# Patient Record
Sex: MALE | Race: OTHER | HISPANIC OR LATINO | URBAN - METROPOLITAN AREA
[De-identification: names, ages, dates, MRNs, and addresses within clinical notes are randomized per-mention and may not be internally consistent; named-entity substitution may affect disease eponyms.]

---

## 2018-03-28 ENCOUNTER — EMERGENCY (EMERGENCY)
Facility: HOSPITAL | Age: 63
LOS: 1 days | Discharge: OTHER INSTITUTION | End: 2018-03-28
Attending: EMERGENCY MEDICINE | Admitting: EMERGENCY MEDICINE
Payer: MEDICARE

## 2018-03-28 VITALS
OXYGEN SATURATION: 100 % | SYSTOLIC BLOOD PRESSURE: 183 MMHG | HEIGHT: 64 IN | DIASTOLIC BLOOD PRESSURE: 111 MMHG | HEART RATE: 93 BPM | WEIGHT: 134.92 LBS | TEMPERATURE: 98 F | RESPIRATION RATE: 20 BRPM

## 2018-03-28 DIAGNOSIS — F31.2 BIPOLAR DISORDER, CURRENT EPISODE MANIC SEVERE WITH PSYCHOTIC FEATURES: ICD-10-CM

## 2018-03-28 LAB
ALBUMIN SERPL ELPH-MCNC: 4.3 G/DL — SIGNIFICANT CHANGE UP (ref 3.3–5.2)
ALP SERPL-CCNC: 56 U/L — SIGNIFICANT CHANGE UP (ref 40–120)
ALT FLD-CCNC: 29 U/L — SIGNIFICANT CHANGE UP
AMPHET UR-MCNC: NEGATIVE — SIGNIFICANT CHANGE UP
ANION GAP SERPL CALC-SCNC: 10 MMOL/L — SIGNIFICANT CHANGE UP (ref 5–17)
ANION GAP SERPL CALC-SCNC: 15 MMOL/L — SIGNIFICANT CHANGE UP (ref 5–17)
APAP SERPL-MCNC: <7.5 UG/ML — LOW (ref 10–26)
APPEARANCE UR: CLEAR — SIGNIFICANT CHANGE UP
AST SERPL-CCNC: 34 U/L — SIGNIFICANT CHANGE UP
BACTERIA # UR AUTO: ABNORMAL
BARBITURATES UR SCN-MCNC: NEGATIVE — SIGNIFICANT CHANGE UP
BASOPHILS # BLD AUTO: 0.1 K/UL — SIGNIFICANT CHANGE UP (ref 0–0.2)
BASOPHILS NFR BLD AUTO: 1.5 % — SIGNIFICANT CHANGE UP (ref 0–2)
BENZODIAZ UR-MCNC: NEGATIVE — SIGNIFICANT CHANGE UP
BILIRUB SERPL-MCNC: 0.4 MG/DL — SIGNIFICANT CHANGE UP (ref 0.4–2)
BILIRUB UR-MCNC: NEGATIVE — SIGNIFICANT CHANGE UP
BUN SERPL-MCNC: 12 MG/DL — SIGNIFICANT CHANGE UP (ref 8–20)
BUN SERPL-MCNC: 12 MG/DL — SIGNIFICANT CHANGE UP (ref 8–20)
CALCIUM SERPL-MCNC: 8.9 MG/DL — SIGNIFICANT CHANGE UP (ref 8.6–10.2)
CALCIUM SERPL-MCNC: 9.9 MG/DL — SIGNIFICANT CHANGE UP (ref 8.6–10.2)
CHLORIDE SERPL-SCNC: 88 MMOL/L — LOW (ref 98–107)
CHLORIDE SERPL-SCNC: 98 MMOL/L — SIGNIFICANT CHANGE UP (ref 98–107)
CO2 SERPL-SCNC: 21 MMOL/L — LOW (ref 22–29)
CO2 SERPL-SCNC: 24 MMOL/L — SIGNIFICANT CHANGE UP (ref 22–29)
COCAINE METAB.OTHER UR-MCNC: NEGATIVE — SIGNIFICANT CHANGE UP
COLOR SPEC: SIGNIFICANT CHANGE UP
CREAT SERPL-MCNC: 0.91 MG/DL — SIGNIFICANT CHANGE UP (ref 0.5–1.3)
CREAT SERPL-MCNC: 1 MG/DL — SIGNIFICANT CHANGE UP (ref 0.5–1.3)
DIFF PNL FLD: NEGATIVE — SIGNIFICANT CHANGE UP
EOSINOPHIL # BLD AUTO: 0.2 K/UL — SIGNIFICANT CHANGE UP (ref 0–0.5)
EOSINOPHIL NFR BLD AUTO: 2.3 % — SIGNIFICANT CHANGE UP (ref 0–5)
EPI CELLS # UR: SIGNIFICANT CHANGE UP
ETHANOL SERPL-MCNC: <10 MG/DL — SIGNIFICANT CHANGE UP
GLUCOSE SERPL-MCNC: 169 MG/DL — HIGH (ref 70–115)
GLUCOSE SERPL-MCNC: 95 MG/DL — SIGNIFICANT CHANGE UP (ref 70–115)
GLUCOSE UR QL: NEGATIVE MG/DL — SIGNIFICANT CHANGE UP
HCT VFR BLD CALC: 40.7 % — LOW (ref 42–52)
HGB BLD-MCNC: 14.2 G/DL — SIGNIFICANT CHANGE UP (ref 14–18)
KETONES UR-MCNC: NEGATIVE — SIGNIFICANT CHANGE UP
LEUKOCYTE ESTERASE UR-ACNC: NEGATIVE — SIGNIFICANT CHANGE UP
LYMPHOCYTES # BLD AUTO: 1.5 K/UL — SIGNIFICANT CHANGE UP (ref 1–4.8)
LYMPHOCYTES # BLD AUTO: 20.5 % — SIGNIFICANT CHANGE UP (ref 20–55)
MCHC RBC-ENTMCNC: 28.5 PG — SIGNIFICANT CHANGE UP (ref 27–31)
MCHC RBC-ENTMCNC: 34.9 G/DL — SIGNIFICANT CHANGE UP (ref 32–36)
MCV RBC AUTO: 81.7 FL — SIGNIFICANT CHANGE UP (ref 80–94)
METHADONE UR-MCNC: NEGATIVE — SIGNIFICANT CHANGE UP
MONOCYTES # BLD AUTO: 0.6 K/UL — SIGNIFICANT CHANGE UP (ref 0–0.8)
MONOCYTES NFR BLD AUTO: 7.8 % — SIGNIFICANT CHANGE UP (ref 3–10)
NEUTROPHILS # BLD AUTO: 4.9 K/UL — SIGNIFICANT CHANGE UP (ref 1.8–8)
NEUTROPHILS NFR BLD AUTO: 67.6 % — SIGNIFICANT CHANGE UP (ref 37–73)
NITRITE UR-MCNC: NEGATIVE — SIGNIFICANT CHANGE UP
OPIATES UR-MCNC: NEGATIVE — SIGNIFICANT CHANGE UP
PCP SPEC-MCNC: SIGNIFICANT CHANGE UP
PCP UR-MCNC: NEGATIVE — SIGNIFICANT CHANGE UP
PH UR: 6.5 — SIGNIFICANT CHANGE UP (ref 5–8)
PLATELET # BLD AUTO: 323 K/UL — SIGNIFICANT CHANGE UP (ref 150–400)
POTASSIUM SERPL-MCNC: 4.7 MMOL/L — SIGNIFICANT CHANGE UP (ref 3.5–5.3)
POTASSIUM SERPL-MCNC: 5.3 MMOL/L — SIGNIFICANT CHANGE UP (ref 3.5–5.3)
POTASSIUM SERPL-SCNC: 4.7 MMOL/L — SIGNIFICANT CHANGE UP (ref 3.5–5.3)
POTASSIUM SERPL-SCNC: 5.3 MMOL/L — SIGNIFICANT CHANGE UP (ref 3.5–5.3)
PROT SERPL-MCNC: 7.9 G/DL — SIGNIFICANT CHANGE UP (ref 6.6–8.7)
PROT UR-MCNC: 15 MG/DL
RBC # BLD: 4.98 M/UL — SIGNIFICANT CHANGE UP (ref 4.6–6.2)
RBC # FLD: 13 % — SIGNIFICANT CHANGE UP (ref 11–15.6)
SALICYLATES SERPL-MCNC: <0.6 MG/DL — LOW (ref 10–20)
SODIUM SERPL-SCNC: 124 MMOL/L — LOW (ref 135–145)
SODIUM SERPL-SCNC: 132 MMOL/L — LOW (ref 135–145)
SP GR SPEC: 1.01 — SIGNIFICANT CHANGE UP (ref 1.01–1.02)
THC UR QL: NEGATIVE — SIGNIFICANT CHANGE UP
TSH SERPL-MCNC: 1.87 UIU/ML — SIGNIFICANT CHANGE UP (ref 0.27–4.2)
UROBILINOGEN FLD QL: NEGATIVE MG/DL — SIGNIFICANT CHANGE UP
WBC # BLD: 7.3 K/UL — SIGNIFICANT CHANGE UP (ref 4.8–10.8)
WBC # FLD AUTO: 7.3 K/UL — SIGNIFICANT CHANGE UP (ref 4.8–10.8)
WBC UR QL: SIGNIFICANT CHANGE UP

## 2018-03-28 PROCEDURE — 99285 EMERGENCY DEPT VISIT HI MDM: CPT

## 2018-03-28 PROCEDURE — 93010 ELECTROCARDIOGRAM REPORT: CPT

## 2018-03-28 RX ORDER — SODIUM CHLORIDE 9 MG/ML
1000 INJECTION INTRAMUSCULAR; INTRAVENOUS; SUBCUTANEOUS ONCE
Qty: 0 | Refills: 0 | Status: COMPLETED | OUTPATIENT
Start: 2018-03-28 | End: 2018-03-28

## 2018-03-28 RX ORDER — OLANZAPINE 15 MG/1
5 TABLET, FILM COATED ORAL
Qty: 0 | Refills: 0 | Status: DISCONTINUED | OUTPATIENT
Start: 2018-03-28 | End: 2018-04-02

## 2018-03-28 RX ORDER — HALOPERIDOL DECANOATE 100 MG/ML
5 INJECTION INTRAMUSCULAR EVERY 4 HOURS
Qty: 0 | Refills: 0 | Status: DISCONTINUED | OUTPATIENT
Start: 2018-03-28 | End: 2018-04-02

## 2018-03-28 RX ORDER — HALOPERIDOL DECANOATE 100 MG/ML
5 INJECTION INTRAMUSCULAR ONCE
Qty: 0 | Refills: 0 | Status: COMPLETED | OUTPATIENT
Start: 2018-03-28 | End: 2018-03-28

## 2018-03-28 RX ORDER — SODIUM CHLORIDE 9 MG/ML
1 INJECTION INTRAMUSCULAR; INTRAVENOUS; SUBCUTANEOUS ONCE
Qty: 0 | Refills: 0 | Status: COMPLETED | OUTPATIENT
Start: 2018-03-28 | End: 2018-03-28

## 2018-03-28 RX ADMIN — SODIUM CHLORIDE 1000 MILLILITER(S): 9 INJECTION INTRAMUSCULAR; INTRAVENOUS; SUBCUTANEOUS at 19:49

## 2018-03-28 RX ADMIN — HALOPERIDOL DECANOATE 5 MILLIGRAM(S): 100 INJECTION INTRAMUSCULAR at 15:00

## 2018-03-28 NOTE — ED ADULT NURSE REASSESSMENT NOTE - NS ED NURSE REASSESS COMMENT FT1
Pt returned to , in no acute distress at this time ambulating well OOB to bathroom and day bentley.  Pt ate 50% of meal tolerated well.  Will continue to monitor and maintain safety.  Sodium currently at 132, in no distress.  Awaiting dispo.

## 2018-03-28 NOTE — ED BEHAVIORAL HEALTH ASSESSMENT NOTE - PAST PSYCHOTROPIC MEDICATION
As above, pt has also taken Depakote in the past. Note: pt is poor historian and information about medications obtained by calling pharmacy

## 2018-03-28 NOTE — ED BEHAVIORAL HEALTH ASSESSMENT NOTE - PATIENT'S CHIEF COMPLAINT
My stomach hurts because my wife and daughter are trying to poison me.. Patient reports that his stomach hurts because people are trying to poison him.

## 2018-03-28 NOTE — ED BEHAVIORAL HEALTH ASSESSMENT NOTE - CURRENT MEDICATION
Not taking any medications, as per prior record :  Nexium 40 mg daily, Haldol decanoate 100 mg IM x 4 weeks, Lithium carbonate  mg at night, Haldol 10 mg at night Last medication regimen (pt has been non compliant): Klonopin 1 mg twice daily (started at [facility]), Depakote  mg at night, Zyprexa 10 mg twice daily, Haldol decanoate 100 mg IM per month (has been refusing months)

## 2018-03-28 NOTE — ED BEHAVIORAL HEALTH ASSESSMENT NOTE - OTHER PAST PSYCHIATRIC HISTORY (INCLUDE DETAILS REGARDING ONSET, COURSE OF ILLNESS, INPATIENT/OUTPATIENT TREATMENT)
Patient reports that he has 20 year history of multiple psychiatric hospitalizations for aggression (likely psychosis).  Patient denies any prior suicide attempts. Patient has followed up on the grounds of [facility] as an [facility] Patient reports that he has 20 year history of multiple psychiatric hospitalizations for aggression (likely psychosis).  Patient denies any prior suicide attempts. Patient has followed up on the grounds of [facility] as an outpatient. Currently followed by ACT team.  Last hospitalized in [facility] in January.

## 2018-03-28 NOTE — ED ADULT NURSE REASSESSMENT NOTE - NS ED NURSE REASSESS COMMENT FT1
Patient offered dinner and refused.  Patient educated about medication and encouraged to comply but refused medication reporting it would be bad for his stomach.  No attempts to harm self or others and safety maintained. Patient offered dinner and refused.  Patient educated about medication and encouraged to comply but refused medication reporting it would be bad for his stomach.  No attempts to harm self or others and safety maintained.  Dr. Sanders notified of refusal.

## 2018-03-28 NOTE — ED PROVIDER NOTE - PROGRESS NOTE DETAILS
As per sign-out from Dr. Hart patient pending repeat bmp in the morning to assess sodium. pt signed out to me from a group home awaiting repeat labs. pt is no longer hyponatremic and will be transferred back to [facility]

## 2018-03-28 NOTE — ED ADULT TRIAGE NOTE - CHIEF COMPLAINT QUOTE
Patient brought in by ambulance A/Ox3, as per EMS-daughter states he has not taken any of his medication for over a month, hx of schizophrenia.  Crisis center called 911.  Patient angry/yelling in triage.

## 2018-03-28 NOTE — ED BEHAVIORAL HEALTH ASSESSMENT NOTE - SUMMARY
Patient is a 62  year old, male; domicile with mother, daughter and 3 y/o grandson; ; noncaregiver; unemployed; PPH of schizophrenia; with multiple prior psychiatric hospitalizations ; no known suicide attempts; no active substance abuse or known history of complicated withdrawal; PMH of GERD ; brought in at daughter's request for not takning medications for one month and decompen Patient is a 62  year old, male; domiciles; ; noncaregiver; unemployed; PPH of schizophrenia; with multiple prior psychiatric hospitalizations ; no known suicide attempts; no active substance abuse or known history of complicated withdrawal; PMH of GERD ; brought in at daughter's request by ACT team  for not taking medications and decompensating.  Patient is currently exhibiting disorganized speech, with intense, labile mood, paranoia, making suicidal statements, with poor insight and judgment and non compliance with medications.  Patient requires inpatient psychiatric hospitalization for safety and stabilization.

## 2018-03-28 NOTE — ED PROVIDER NOTE - OBJECTIVE STATEMENT
61 y/o M, with hx of schizophrenia, BIBA from group home for aggressive behavior.  Pt has been refusing his medications for several days and has become increasingly more agitated.  Denies any physical complaints at this time including fever, chills, visiual changes, CP, SOB, HA, cough, abd pain, N/V/D, or back pain.

## 2018-03-28 NOTE — ED BEHAVIORAL HEALTH ASSESSMENT NOTE - HPI (INCLUDE ILLNESS QUALITY, SEVERITY, DURATION, TIMING, CONTEXT, MODIFYING FACTORS, ASSOCIATED SIGNS AND SYMPTOMS)
Patient is a 62 year old, male; domicile with mother, daughter and 1 y/o grandson; ; noncaregiver; unemployed; PPH of schizophrenia; with multiple prior psychiatric hospitalizations ; no known suicide attempts; no active substance abuse or known history of complicated withdrawal; PMH of GERD ; brought in by EMS apparently called by daughter for decompensation and non compliance with medications for one month.      As per evaluation on 10/19/16:        On interview pt was initially mute and was not willing to talk. He nodded his head when asked if he was depressed. After further explanation by writer, pt was able to engage in interview. Interview was conducted in Cuban.  Patient admits that he was discharged from the hospital about one week ago. He states that he does not take any medications because the doctor told them he does not need them anymore.  He does not believe he has any psychiatric illness, although concedes that other people have said he has schizophrenia.  He reports that his stomach hurt and that is the reason he came to the hospital. He explains that he believes that his wife and daughter are trying to kill him by poisoning his food. He started crying during interview stating that his daughter took away his grandson, and now a  takes care of him. He believes  is trying to hurt grandson. He reports that he wants to protect his grandson. He mentions that the neighbors area also trying to kill him.  Patient admits to feeling depressed.  He states he can't sleep, has poor energy, can't concentrate and has poor appetite.  When suicidal ideation was explored he admits that he wants to kill himself by taking pills and states that the only reason he has not done this is because he does not have any pills to take.  He states he is not following up at [facility] because he does not need to and has no problems. Patient denies hearing any voices.  He denies any current substance use.       Spoke with wife who reports pt has been in and out of psychiatric hospitals and has not returned to baseline as of yet.  She feels that he is worse.  Reports he is not taking medications or following up with treatment. He has done well in the past when he has been compliant with treatment. She corroborates that pt feels that people including his wife and daughter want to kill him and that he has been making suicidal statements.  She reports pt has been hiding knives and put plants under his shirt because he feels that plants can cure him.  She states that she and daughter works and pt does not have supervision and support and that currently pt is not able to function independently. His mood is unstable and at times he cries for no apparent reason.  She is concerned for his safety and feels he needs to be in the hospital until he is stabilized.        Called pharmacy ([facility] [phone]), who reported that pt has prescriptions for Nexium 40 mg dailyi,  Haldol decanoate 100 mg injection, lithium carbonate  at night, cogentin 2 mga t night, Haldol 10 mg at night that was transmitted on October 13th and never picked up from pharmacy (By Dr. Stephenson [phone]). He also had depakote 500 mg ER prescribed in August that he never picked up. Patient is a 62 year old, male; domiciled alone,  ; noncaregiver; unemployed; PPH of schizophrenia; with multiple prior psychiatric hospitalizations ; no known suicide attempts; no active substance abuse or known history of complicated withdrawal; PMH of GERD ; brought in by EMS accompanied by ACT team,  apparently called by daughter for decompensation and non compliance with medications for one month.         Patient formerly lived with wife but has been living on his own for the last 6 months.   Patient admits that he is not taking medications.  He does not know why he is here and does not feel he has any psychiatric problems.  He states that he is not crazy but that Nic mckeon is, and that Nic Mckeon is the antichrist.  He states that other residents are exposing their breasts to him and that managers in th home want to have sex with him. He also states that derik is trying to poison him and are urinating in his water and taking drugs.  He initially denied any desire to hurt himself or others, but then stated that he wanted to be given a lethal injection to join his mother in [facility]     Spoke with ACT team (Chloe) who reported that patient has been non compliant with medications and avoiding the ACT team.  ACT team has seen patient in only on of prior 6 visits.  He has documented diagnoses of bipolar disorder with psychotic features.  He has not taken Haldol decanoate in several months, and he became less compliant with treatment when he moved to [place] to live on his own.  When he stops taking medications, patient becomes paranoid, disorganized and incoherent.  He does very well when he takes his medications.  He was last discharged from [facility] in January     Attempted to contact daughter and wife for collateral information with no answer.  Voicemail was full and left message to call back writer in other number with no response.

## 2018-03-28 NOTE — ED BEHAVIORAL HEALTH ASSESSMENT NOTE - PRIMARY DX
Schizophrenia, paranoid Bipolar affective disorder, currently manic, severe, with psychotic features
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
## 2018-03-28 NOTE — ED BEHAVIORAL HEALTH ASSESSMENT NOTE - DESCRIPTION
Patient does not know of any, apparently GERD Patient was born in [place], Came to [place] years ago Patient was sitting in chair.  He did not appear to be in physical distress.  His affect was intense, labile with expansive range.  Patient went from laughing hysterically to being on the verge of tears to anger in seconds.     Vital Signs Last 24 Hrs  T(C): 36.7 (28 Mar 2018 15:03), Max: 36.7 (28 Mar 2018 15:03)  T(F): 98.1 (28 Mar 2018 15:03), Max: 98.1 (28 Mar 2018 15:03)  HR: 93 (28 Mar 2018 15:03) (93 - 93)  BP: 183/111 (28 Mar 2018 15:03) (183/111 - 183/111)  BP(mean): --  RR: 20 (28 Mar 2018 15:03) (20 - 20)  SpO2: 100% (28 Mar 2018 15:03) (100% - 100%)

## 2018-03-29 VITALS
OXYGEN SATURATION: 99 % | HEART RATE: 63 BPM | SYSTOLIC BLOOD PRESSURE: 158 MMHG | DIASTOLIC BLOOD PRESSURE: 81 MMHG | RESPIRATION RATE: 20 BRPM | TEMPERATURE: 98 F

## 2018-03-29 LAB
ANION GAP SERPL CALC-SCNC: 10 MMOL/L — SIGNIFICANT CHANGE UP (ref 5–17)
BUN SERPL-MCNC: 12 MG/DL — SIGNIFICANT CHANGE UP (ref 8–20)
CALCIUM SERPL-MCNC: 9.1 MG/DL — SIGNIFICANT CHANGE UP (ref 8.6–10.2)
CHLORIDE SERPL-SCNC: 98 MMOL/L — SIGNIFICANT CHANGE UP (ref 98–107)
CO2 SERPL-SCNC: 27 MMOL/L — SIGNIFICANT CHANGE UP (ref 22–29)
CREAT SERPL-MCNC: 0.91 MG/DL — SIGNIFICANT CHANGE UP (ref 0.5–1.3)
GLUCOSE SERPL-MCNC: 104 MG/DL — SIGNIFICANT CHANGE UP (ref 70–115)
POTASSIUM SERPL-MCNC: 5 MMOL/L — SIGNIFICANT CHANGE UP (ref 3.5–5.3)
POTASSIUM SERPL-SCNC: 5 MMOL/L — SIGNIFICANT CHANGE UP (ref 3.5–5.3)
SODIUM SERPL-SCNC: 135 MMOL/L — SIGNIFICANT CHANGE UP (ref 135–145)

## 2018-03-29 PROCEDURE — 93010 ELECTROCARDIOGRAM REPORT: CPT

## 2018-03-29 PROCEDURE — 84443 ASSAY THYROID STIM HORMONE: CPT

## 2018-03-29 PROCEDURE — 36415 COLL VENOUS BLD VENIPUNCTURE: CPT

## 2018-03-29 PROCEDURE — 85027 COMPLETE CBC AUTOMATED: CPT

## 2018-03-29 PROCEDURE — T1013: CPT

## 2018-03-29 PROCEDURE — 80307 DRUG TEST PRSMV CHEM ANLYZR: CPT

## 2018-03-29 PROCEDURE — 93005 ELECTROCARDIOGRAM TRACING: CPT

## 2018-03-29 PROCEDURE — 80053 COMPREHEN METABOLIC PANEL: CPT

## 2018-03-29 PROCEDURE — 99285 EMERGENCY DEPT VISIT HI MDM: CPT | Mod: 25

## 2018-03-29 PROCEDURE — 96372 THER/PROPH/DIAG INJ SC/IM: CPT

## 2018-03-29 PROCEDURE — 81001 URINALYSIS AUTO W/SCOPE: CPT

## 2018-03-29 PROCEDURE — 80048 BASIC METABOLIC PNL TOTAL CA: CPT

## 2018-03-29 RX ADMIN — OLANZAPINE 5 MILLIGRAM(S): 15 TABLET, FILM COATED ORAL at 06:08

## 2018-03-29 RX ADMIN — SODIUM CHLORIDE 1 GRAM(S): 9 INJECTION INTRAMUSCULAR; INTRAVENOUS; SUBCUTANEOUS at 01:00

## 2018-03-29 NOTE — ED BEHAVIORAL HEALTH NOTE - BEHAVIORAL HEALTH NOTE
SW Note: Pt transferred to [facility] for inpt psychiatric tx. Accepting MD, Dr. Copeland. 9.37 legals completed. Ambulance arranged with [facility]. Msg left for ACT team [phone]

## 2018-03-29 NOTE — ED ADULT NURSE REASSESSMENT NOTE - NS ED NURSE REASSESS COMMENT FT1
Pt currently sleeping, sodium meds given, tolerated well, repeat in the am Blood work and EKG, for dispo.  Will continue to monitor and maintain safety.

## 2018-03-29 NOTE — ED ADULT NURSE REASSESSMENT NOTE - NS ED NURSE REASSESS COMMENT FT1
Patient has been resting comfortably. Accepted for transfer to [facility]. Patient aware and expressed understanding and intent to cooperate with transfer process. EMS called.

## 2018-03-29 NOTE — ED ADULT NURSE REASSESSMENT NOTE - TEMPLATE LIST FOR HEAD TO TOE ASSESSMENT
Psych/Behavioral

## 2018-03-29 NOTE — ED BEHAVIORAL HEALTH NOTE - BEHAVIORAL HEALTH NOTE
SW Note: pt presented to [facility]  and Dr. Fowler requested pt be given NA to bring up from 124, pt trx back to Main ED,  treated and returned to  with Na 132 -  [facility] still declining -  pending repeat Na >135 + repeat ekg for AM. Held for AM - MD to order oral Na and repeat in AM

## 2018-03-30 ENCOUNTER — EMERGENCY (EMERGENCY)
Facility: HOSPITAL | Age: 63
LOS: 1 days | Discharge: HOME | End: 2018-03-30
Attending: EMERGENCY MEDICINE
Payer: MEDICARE

## 2018-03-30 VITALS
HEART RATE: 95 BPM | TEMPERATURE: 98 F | HEIGHT: 69 IN | OXYGEN SATURATION: 98 % | SYSTOLIC BLOOD PRESSURE: 152 MMHG | WEIGHT: 190.04 LBS | RESPIRATION RATE: 18 BRPM | DIASTOLIC BLOOD PRESSURE: 80 MMHG

## 2018-03-30 LAB
ALBUMIN SERPL ELPH-MCNC: 3.8 G/DL — SIGNIFICANT CHANGE UP (ref 3.3–5.2)
ALP SERPL-CCNC: 54 U/L — SIGNIFICANT CHANGE UP (ref 40–120)
ALT FLD-CCNC: 23 U/L — SIGNIFICANT CHANGE UP
ANION GAP SERPL CALC-SCNC: 10 MMOL/L — SIGNIFICANT CHANGE UP (ref 5–17)
AST SERPL-CCNC: 33 U/L — SIGNIFICANT CHANGE UP
BILIRUB SERPL-MCNC: <0.2 MG/DL — LOW (ref 0.4–2)
BUN SERPL-MCNC: 14 MG/DL — SIGNIFICANT CHANGE UP (ref 8–20)
CALCIUM SERPL-MCNC: 8.9 MG/DL — SIGNIFICANT CHANGE UP (ref 8.6–10.2)
CHLORIDE SERPL-SCNC: 93 MMOL/L — LOW (ref 98–107)
CO2 SERPL-SCNC: 26 MMOL/L — SIGNIFICANT CHANGE UP (ref 22–29)
CREAT SERPL-MCNC: 0.83 MG/DL — SIGNIFICANT CHANGE UP (ref 0.5–1.3)
GLUCOSE SERPL-MCNC: 110 MG/DL — SIGNIFICANT CHANGE UP (ref 70–115)
HCT VFR BLD CALC: 36.5 % — LOW (ref 42–52)
HGB BLD-MCNC: 12.6 G/DL — LOW (ref 14–18)
MCHC RBC-ENTMCNC: 28.3 PG — SIGNIFICANT CHANGE UP (ref 27–31)
MCHC RBC-ENTMCNC: 34.5 G/DL — SIGNIFICANT CHANGE UP (ref 32–36)
MCV RBC AUTO: 82 FL — SIGNIFICANT CHANGE UP (ref 80–94)
OSMOLALITY SERPL: 286 MOSM/KG — SIGNIFICANT CHANGE UP (ref 280–300)
PLATELET # BLD AUTO: 289 K/UL — SIGNIFICANT CHANGE UP (ref 150–400)
POTASSIUM SERPL-MCNC: 4.4 MMOL/L — SIGNIFICANT CHANGE UP (ref 3.5–5.3)
POTASSIUM SERPL-SCNC: 4.4 MMOL/L — SIGNIFICANT CHANGE UP (ref 3.5–5.3)
PROT SERPL-MCNC: 6.8 G/DL — SIGNIFICANT CHANGE UP (ref 6.6–8.7)
RBC # BLD: 4.45 M/UL — LOW (ref 4.6–6.2)
RBC # FLD: 12.9 % — SIGNIFICANT CHANGE UP (ref 11–15.6)
SODIUM SERPL-SCNC: 129 MMOL/L — LOW (ref 135–145)
WBC # BLD: 7.8 K/UL — SIGNIFICANT CHANGE UP (ref 4.8–10.8)
WBC # FLD AUTO: 7.8 K/UL — SIGNIFICANT CHANGE UP (ref 4.8–10.8)

## 2018-03-30 PROCEDURE — 80053 COMPREHEN METABOLIC PANEL: CPT

## 2018-03-30 PROCEDURE — 83930 ASSAY OF BLOOD OSMOLALITY: CPT

## 2018-03-30 PROCEDURE — 85027 COMPLETE CBC AUTOMATED: CPT

## 2018-03-30 PROCEDURE — 99284 EMERGENCY DEPT VISIT MOD MDM: CPT

## 2018-03-30 PROCEDURE — 36415 COLL VENOUS BLD VENIPUNCTURE: CPT

## 2018-03-30 PROCEDURE — 99283 EMERGENCY DEPT VISIT LOW MDM: CPT

## 2018-03-30 RX ORDER — SODIUM CHLORIDE 9 MG/ML
1 INJECTION INTRAMUSCULAR; INTRAVENOUS; SUBCUTANEOUS ONCE
Qty: 0 | Refills: 0 | Status: COMPLETED | OUTPATIENT
Start: 2018-03-30 | End: 2018-03-30

## 2018-03-30 RX ADMIN — SODIUM CHLORIDE 1 GRAM(S): 9 INJECTION INTRAMUSCULAR; INTRAVENOUS; SUBCUTANEOUS at 20:23

## 2018-03-30 NOTE — ED ADULT NURSE NOTE - CHIEF COMPLAINT QUOTE
pt sent from [facility] for hyponatremia    md at [facility] states that he droped 9 points in 24 hours befor they could place pt on fluid restriction.

## 2018-03-30 NOTE — CHART NOTE - NSCHARTNOTEFT_GEN_A_CORE
SW Note: Intern Freda Garcia arranged for pt transport via [facility] back to [facility].

## 2018-03-30 NOTE — ED ADULT NURSE NOTE - OBJECTIVE STATEMENT
Pt. sent to ED by PMD at [facility] for low Na+ at 136 last check on 3/30. Pt. denies any symptoms of N/V/ HA change in vision, CP, or SOB at this time. Pt. able to move all extremities with ease pulses regular bilaterally, skin warm, dry, and intact. Lung sounds clear. [facility] aid at bedside. Pt. is able to ambulate with a steady gait.

## 2018-03-30 NOTE — ED ADULT NURSE REASSESSMENT NOTE - NS ED NURSE REASSESS COMMENT FT1
pt care assumed at 2000, no apparent distress noted at this time, charting as noted. pt received A&Ox2 resting in bed comfortably with [facility] aide at bedside. pt denies any complaints at this time. Pt was medicated as per orders. Pt states he wants to brush his teeth. Tooth brush provided. plan of care explained, will continue to monitor.

## 2018-03-30 NOTE — ED PROVIDER NOTE - OBJECTIVE STATEMENT
63 y/o M pt with hx schizophrenia presents to ED from [facility], due to hyponatremia. He has a hx hyponatremia and drinking excessive water; he I currently on 100 ml fluid restriction. Currently asymptomatic. He was here March 28,2017 with a sodium level of 124 and discharged the next day with a level 135.  He is currently on Zyprexa, Ativan and Haldol. No further complaints at this time. 63 y/o M pt with hx schizophrenia presents to ED from [facility], due to hyponatremia. He has a hx hyponatremia and drinking excessive water; he is currently on 100 ml fluid restriction. Currently asymptomatic. He was here March 28,2017 with a sodium level of 124 and discharged the next day with a level 135.  He is currently on Zyprexa, Ativan and Haldol. No further complaints at this time. 61 y/o M pt with hx schizophrenia presents to ED from [facility], due to hyponatremia. He has a hx of hyponatremia and drinking excessive water; he is currently on 100 ml fluid restriction. Currently asymptomatic. He was here March 28,2017 with a sodium level of 124 and discharged the next day with a level 135.  He is currently on Zyprexa, Ativan and Haldol. No further complaints at this time. 63 y/o M pt with hx schizophrenia presents to ED from [facility], due to hyponatremia. He has a hx of hyponatremia and drinking excessive water; he is currently on 1000 ml fluid restriction. Currently asymptomatic. He was here March 28,2017 with a sodium level of 124 and discharged the next day with a level 135.  He current psych meds are Zyprexa, Ativan and Haldol. No further complaints at this time.

## 2018-03-30 NOTE — ED ADULT TRIAGE NOTE - CHIEF COMPLAINT QUOTE
pt sent from [facility] for hyponatremia    md at [facility] states that he droped 9 points in 24 hours befor they could place pt on fluid restriction.

## 2018-04-02 ENCOUNTER — INPATIENT (INPATIENT)
Facility: HOSPITAL | Age: 63
LOS: 1 days | Discharge: PSYCHIATRIC OR SUBSTANCE USE FACILITY | DRG: 644 | End: 2018-04-04
Attending: FAMILY MEDICINE | Admitting: FAMILY MEDICINE
Payer: MEDICARE

## 2018-04-02 VITALS
SYSTOLIC BLOOD PRESSURE: 169 MMHG | RESPIRATION RATE: 16 BRPM | HEART RATE: 78 BPM | TEMPERATURE: 98 F | WEIGHT: 164.91 LBS | OXYGEN SATURATION: 100 % | DIASTOLIC BLOOD PRESSURE: 75 MMHG

## 2018-04-02 DIAGNOSIS — K21.9 GASTRO-ESOPHAGEAL REFLUX DISEASE WITHOUT ESOPHAGITIS: ICD-10-CM

## 2018-04-02 DIAGNOSIS — Z29.9 ENCOUNTER FOR PROPHYLACTIC MEASURES, UNSPECIFIED: ICD-10-CM

## 2018-04-02 DIAGNOSIS — F31.9 BIPOLAR DISORDER, UNSPECIFIED: ICD-10-CM

## 2018-04-02 DIAGNOSIS — E87.1 HYPO-OSMOLALITY AND HYPONATREMIA: ICD-10-CM

## 2018-04-02 LAB
ALBUMIN SERPL ELPH-MCNC: 3.7 G/DL — SIGNIFICANT CHANGE UP (ref 3.3–5)
ALP SERPL-CCNC: 64 U/L — SIGNIFICANT CHANGE UP (ref 40–120)
ALT FLD-CCNC: 31 U/L — SIGNIFICANT CHANGE UP (ref 12–78)
ANION GAP SERPL CALC-SCNC: 7 MMOL/L — SIGNIFICANT CHANGE UP (ref 5–17)
APPEARANCE UR: CLEAR — SIGNIFICANT CHANGE UP
AST SERPL-CCNC: 29 U/L — SIGNIFICANT CHANGE UP (ref 15–37)
BASOPHILS # BLD AUTO: 0.2 K/UL — SIGNIFICANT CHANGE UP (ref 0–0.2)
BASOPHILS NFR BLD AUTO: 2.3 % — HIGH (ref 0–2)
BILIRUB SERPL-MCNC: 0.2 MG/DL — SIGNIFICANT CHANGE UP (ref 0.2–1.2)
BILIRUB UR-MCNC: NEGATIVE — SIGNIFICANT CHANGE UP
BUN SERPL-MCNC: 16 MG/DL — SIGNIFICANT CHANGE UP (ref 7–23)
CALCIUM SERPL-MCNC: 8.8 MG/DL — SIGNIFICANT CHANGE UP (ref 8.5–10.1)
CHLORIDE SERPL-SCNC: 87 MMOL/L — LOW (ref 96–108)
CO2 SERPL-SCNC: 27 MMOL/L — SIGNIFICANT CHANGE UP (ref 22–31)
COLOR SPEC: SIGNIFICANT CHANGE UP
CREAT SERPL-MCNC: 0.77 MG/DL — SIGNIFICANT CHANGE UP (ref 0.5–1.3)
DIFF PNL FLD: NEGATIVE — SIGNIFICANT CHANGE UP
EOSINOPHIL # BLD AUTO: 0.2 K/UL — SIGNIFICANT CHANGE UP (ref 0–0.5)
EOSINOPHIL NFR BLD AUTO: 2.5 % — SIGNIFICANT CHANGE UP (ref 0–6)
GLUCOSE SERPL-MCNC: 99 MG/DL — SIGNIFICANT CHANGE UP (ref 70–99)
GLUCOSE UR QL: NEGATIVE — SIGNIFICANT CHANGE UP
HCT VFR BLD CALC: 39 % — SIGNIFICANT CHANGE UP (ref 39–50)
HGB BLD-MCNC: 13.1 G/DL — SIGNIFICANT CHANGE UP (ref 13–17)
KETONES UR-MCNC: NEGATIVE — SIGNIFICANT CHANGE UP
LEUKOCYTE ESTERASE UR-ACNC: NEGATIVE — SIGNIFICANT CHANGE UP
LYMPHOCYTES # BLD AUTO: 2.2 K/UL — SIGNIFICANT CHANGE UP (ref 1–3.3)
LYMPHOCYTES # BLD AUTO: 27 % — SIGNIFICANT CHANGE UP (ref 13–44)
MCHC RBC-ENTMCNC: 27.9 PG — SIGNIFICANT CHANGE UP (ref 27–34)
MCHC RBC-ENTMCNC: 33.6 GM/DL — SIGNIFICANT CHANGE UP (ref 32–36)
MCV RBC AUTO: 83 FL — SIGNIFICANT CHANGE UP (ref 80–100)
MONOCYTES # BLD AUTO: 1 K/UL — HIGH (ref 0–0.9)
MONOCYTES NFR BLD AUTO: 12.7 % — HIGH (ref 1–9)
NEUTROPHILS # BLD AUTO: 4.5 K/UL — SIGNIFICANT CHANGE UP (ref 1.8–7.4)
NEUTROPHILS NFR BLD AUTO: 55.5 % — SIGNIFICANT CHANGE UP (ref 43–77)
NITRITE UR-MCNC: NEGATIVE — SIGNIFICANT CHANGE UP
PH UR: 7 — SIGNIFICANT CHANGE UP (ref 5–8)
PLATELET # BLD AUTO: 293 K/UL — SIGNIFICANT CHANGE UP (ref 150–400)
POTASSIUM SERPL-MCNC: 5.1 MMOL/L — SIGNIFICANT CHANGE UP (ref 3.5–5.3)
POTASSIUM SERPL-SCNC: 5.1 MMOL/L — SIGNIFICANT CHANGE UP (ref 3.5–5.3)
PROT SERPL-MCNC: 7.5 G/DL — SIGNIFICANT CHANGE UP (ref 6–8.3)
PROT UR-MCNC: NEGATIVE — SIGNIFICANT CHANGE UP
RBC # BLD: 4.7 M/UL — SIGNIFICANT CHANGE UP (ref 4.2–5.8)
RBC # FLD: 11.6 % — SIGNIFICANT CHANGE UP (ref 10.3–14.5)
SODIUM SERPL-SCNC: 121 MMOL/L — LOW (ref 135–145)
SP GR SPEC: 1 — LOW (ref 1.01–1.02)
UROBILINOGEN FLD QL: NEGATIVE — SIGNIFICANT CHANGE UP
WBC # BLD: 8 K/UL — SIGNIFICANT CHANGE UP (ref 3.8–10.5)
WBC # FLD AUTO: 8 K/UL — SIGNIFICANT CHANGE UP (ref 3.8–10.5)

## 2018-04-02 PROCEDURE — 71045 X-RAY EXAM CHEST 1 VIEW: CPT | Mod: 26

## 2018-04-02 PROCEDURE — 99223 1ST HOSP IP/OBS HIGH 75: CPT | Mod: AI,GC

## 2018-04-02 PROCEDURE — 93010 ELECTROCARDIOGRAM REPORT: CPT

## 2018-04-02 PROCEDURE — 99285 EMERGENCY DEPT VISIT HI MDM: CPT

## 2018-04-02 RX ORDER — SODIUM CHLORIDE 9 MG/ML
1000 INJECTION INTRAMUSCULAR; INTRAVENOUS; SUBCUTANEOUS
Qty: 0 | Refills: 0 | Status: DISCONTINUED | OUTPATIENT
Start: 2018-04-02 | End: 2018-04-02

## 2018-04-02 RX ORDER — SODIUM CHLORIDE 9 MG/ML
1000 INJECTION INTRAMUSCULAR; INTRAVENOUS; SUBCUTANEOUS ONCE
Qty: 0 | Refills: 0 | Status: COMPLETED | OUTPATIENT
Start: 2018-04-02 | End: 2018-04-02

## 2018-04-02 RX ORDER — ENOXAPARIN SODIUM 100 MG/ML
40 INJECTION SUBCUTANEOUS DAILY
Qty: 0 | Refills: 0 | Status: DISCONTINUED | OUTPATIENT
Start: 2018-04-02 | End: 2018-04-04

## 2018-04-02 RX ORDER — OLANZAPINE 15 MG/1
5 TABLET, FILM COATED ORAL ONCE
Qty: 0 | Refills: 0 | Status: COMPLETED | OUTPATIENT
Start: 2018-04-02 | End: 2018-04-02

## 2018-04-02 RX ORDER — OLANZAPINE 15 MG/1
5 TABLET, FILM COATED ORAL EVERY 6 HOURS
Qty: 0 | Refills: 0 | Status: DISCONTINUED | OUTPATIENT
Start: 2018-04-02 | End: 2018-04-02

## 2018-04-02 RX ORDER — DIVALPROEX SODIUM 500 MG/1
500 TABLET, DELAYED RELEASE ORAL
Qty: 0 | Refills: 0 | Status: DISCONTINUED | OUTPATIENT
Start: 2018-04-02 | End: 2018-04-02

## 2018-04-02 RX ORDER — DIVALPROEX SODIUM 500 MG/1
500 TABLET, DELAYED RELEASE ORAL
Qty: 0 | Refills: 0 | Status: DISCONTINUED | OUTPATIENT
Start: 2018-04-02 | End: 2018-04-04

## 2018-04-02 RX ORDER — ACETAMINOPHEN 500 MG
650 TABLET ORAL EVERY 6 HOURS
Qty: 0 | Refills: 0 | Status: DISCONTINUED | OUTPATIENT
Start: 2018-04-02 | End: 2018-04-04

## 2018-04-02 RX ORDER — OLANZAPINE 15 MG/1
5 TABLET, FILM COATED ORAL EVERY 6 HOURS
Qty: 0 | Refills: 0 | Status: DISCONTINUED | OUTPATIENT
Start: 2018-04-02 | End: 2018-04-04

## 2018-04-02 RX ORDER — PANTOPRAZOLE SODIUM 20 MG/1
40 TABLET, DELAYED RELEASE ORAL
Qty: 0 | Refills: 0 | Status: DISCONTINUED | OUTPATIENT
Start: 2018-04-02 | End: 2018-04-04

## 2018-04-02 RX ADMIN — SODIUM CHLORIDE 1000 MILLILITER(S): 9 INJECTION INTRAMUSCULAR; INTRAVENOUS; SUBCUTANEOUS at 17:55

## 2018-04-02 RX ADMIN — OLANZAPINE 5 MILLIGRAM(S): 15 TABLET, FILM COATED ORAL at 20:04

## 2018-04-02 NOTE — H&P ADULT - HISTORY OF PRESENT ILLNESS
61 yo M with PMHx of Bipolar DZ with manic episodes hyponatremia with polydipsia and GERD. Pt manic in ED, refusing to be seem. Called [facility] and spoke on the phone with the PA taking care of him Carinasami. She satets that pt stated developing hyponatremia few months back with prior hospitalization for it in [place] on 3/24/18 and was discharged on fluid restriction 1l and salt tabs. Pt is non-compliant and drink anexcessive amount of water every where he can find, even if the nurses try to keep a clsoe eye on him. His manic episodes became more and more advanced as his hyponatremai worsened. HCP is daughter Berlin Burciaga [phone].  In ED he reeived Zyprexa 5 mg IM x 1   inc bp 182/88 hr 67 agitated , afebrile 63 yo M with PMHx of Bipolar DZ with manic episodes hyponatremia with polydipsia and GERD. Pt manic in ED, refusing to be seem. Called [facility] and spoke on the phone with the PA taking care of him Anthony.  Psych is Dr Zaida Villalpando. She states that pt stated developing hyponatremia few months back with prior hospitalization for it in [place] on 3/24/18 and was discharged on fluid restriction 1l and salt tabs. Pt is non-compliant and drink an excessive amount of water every where he can find, even if the nurses try to keep a close eye on him. His manic episodes became more and more advanced as his hyponatremia worsened. HCP is daughter Berlin Burciaga [phone].  In ED he reeived Zyprexa 5 mg IM x 1   inc bp 182/88 hr 67 agitated , afebrile

## 2018-04-02 NOTE — H&P ADULT - PROBLEM SELECTOR PLAN 2
c/w with home meds  1/1 observation   f/u dr castro c/w with home meds - hold for sedation   1/1 observation   f/u dr castro

## 2018-04-02 NOTE — H&P ADULT - PROBLEM SELECTOR PLAN 1
admit to [facility]  -fluid restriction 1 L per day   - f/u bmp in am   -f/u renal Dr Gary   - NS @ 100 cc /hr admit to [facility]  -fluid restriction 1 L per day   - f/u bmp in am   -f/u renal Dr Gary   -f/u urine lytes w/up

## 2018-04-02 NOTE — ED PROVIDER NOTE - PROGRESS NOTE DETAILS
called Dr. Velarde, says will call back regarding admission discussed case with Dr. Velarde, will admit

## 2018-04-02 NOTE — ED ADULT NURSE NOTE - OBJECTIVE STATEMENT
Pt to ED from [facility] psych c/oo hyponatremia, pt has bi-polar disease and is unable to adhere to fluid restrict diet, aide at bedside

## 2018-04-02 NOTE — ED PROVIDER NOTE - OBJECTIVE STATEMENT
61 yo male hx of bipolar sent from [facility] for hyponatremia (115 this morning) and noncompliance to fluid restrictions.  Was seen at [facility] on 3/30 for similar complaint.  Patient himself otherwise has no complaints. Here with 1:1 aide.

## 2018-04-02 NOTE — H&P ADULT - PROBLEM SELECTOR PLAN 4
lovenox 40 mg qd lovenox 40 mg qd  1. diet - regular   2. ambulate wit assist   3. fall risk lovenox 40 mg qd  1. diet - regular   2. ambulate with assist   3. fall risk

## 2018-04-02 NOTE — H&P ADULT - ATTENDING COMMENTS
61 yo M with PMHx of Bipolar DZ with manic episodes hyponatremia with polydipsia and GERD. Pt manic in ED, admitted for acute on chronic hyponatremia etiology SIADH vs psychogenic. Plan: montior bmp, apprec renal recs, assure not to overcorrect 10meq/24hrs, [facility] physician on call made aware of admission for hyponatrememia, fluid restriction, w/u for siadh, cont psych meds, maintain 1:1 lina obs, apprec psych recs, monitor clinical course and adjust meds prn

## 2018-04-02 NOTE — H&P ADULT - ASSESSMENT
63 yo M with PMHx of Bipolar DZ with manic episodes hyponatremia with polydipsia and GERD. Pt manic in ED, admitted for cute on chronic hyponatremia etiology SIADH vs psychogenic.

## 2018-04-03 LAB
ALBUMIN SERPL ELPH-MCNC: 3.5 G/DL — SIGNIFICANT CHANGE UP (ref 3.3–5)
ALP SERPL-CCNC: 55 U/L — SIGNIFICANT CHANGE UP (ref 40–120)
ALT FLD-CCNC: 26 U/L — SIGNIFICANT CHANGE UP (ref 12–78)
ANION GAP SERPL CALC-SCNC: 6 MMOL/L — SIGNIFICANT CHANGE UP (ref 5–17)
AST SERPL-CCNC: 22 U/L — SIGNIFICANT CHANGE UP (ref 15–37)
BASOPHILS # BLD AUTO: 0.1 K/UL — SIGNIFICANT CHANGE UP (ref 0–0.2)
BASOPHILS NFR BLD AUTO: 1.8 % — SIGNIFICANT CHANGE UP (ref 0–2)
BILIRUB SERPL-MCNC: 0.6 MG/DL — SIGNIFICANT CHANGE UP (ref 0.2–1.2)
BUN SERPL-MCNC: 15 MG/DL — SIGNIFICANT CHANGE UP (ref 7–23)
CALCIUM SERPL-MCNC: 8.5 MG/DL — SIGNIFICANT CHANGE UP (ref 8.5–10.1)
CHLORIDE SERPL-SCNC: 96 MMOL/L — SIGNIFICANT CHANGE UP (ref 96–108)
CO2 SERPL-SCNC: 28 MMOL/L — SIGNIFICANT CHANGE UP (ref 22–31)
CREAT SERPL-MCNC: 0.77 MG/DL — SIGNIFICANT CHANGE UP (ref 0.5–1.3)
CULTURE RESULTS: NO GROWTH — SIGNIFICANT CHANGE UP
EOSINOPHIL # BLD AUTO: 0.3 K/UL — SIGNIFICANT CHANGE UP (ref 0–0.5)
EOSINOPHIL NFR BLD AUTO: 3.6 % — SIGNIFICANT CHANGE UP (ref 0–6)
GLUCOSE SERPL-MCNC: 105 MG/DL — HIGH (ref 70–99)
HCT VFR BLD CALC: 38.6 % — LOW (ref 39–50)
HGB BLD-MCNC: 13.4 G/DL — SIGNIFICANT CHANGE UP (ref 13–17)
LYMPHOCYTES # BLD AUTO: 1.7 K/UL — SIGNIFICANT CHANGE UP (ref 1–3.3)
LYMPHOCYTES # BLD AUTO: 24.1 % — SIGNIFICANT CHANGE UP (ref 13–44)
MAGNESIUM SERPL-MCNC: 2.2 MG/DL — SIGNIFICANT CHANGE UP (ref 1.6–2.6)
MCHC RBC-ENTMCNC: 28.9 PG — SIGNIFICANT CHANGE UP (ref 27–34)
MCHC RBC-ENTMCNC: 34.8 GM/DL — SIGNIFICANT CHANGE UP (ref 32–36)
MCV RBC AUTO: 83 FL — SIGNIFICANT CHANGE UP (ref 80–100)
MONOCYTES # BLD AUTO: 0.8 K/UL — SIGNIFICANT CHANGE UP (ref 0–0.9)
MONOCYTES NFR BLD AUTO: 11.1 % — HIGH (ref 1–9)
NEUTROPHILS # BLD AUTO: 4.3 K/UL — SIGNIFICANT CHANGE UP (ref 1.8–7.4)
NEUTROPHILS NFR BLD AUTO: 59.5 % — SIGNIFICANT CHANGE UP (ref 43–77)
OSMOLALITY UR: 146 MOS/KG — SIGNIFICANT CHANGE UP (ref 50–1200)
PHOSPHATE 24H UR-MCNC: 13.1 MG/DL — SIGNIFICANT CHANGE UP
PLATELET # BLD AUTO: 290 K/UL — SIGNIFICANT CHANGE UP (ref 150–400)
POTASSIUM SERPL-MCNC: 4.6 MMOL/L — SIGNIFICANT CHANGE UP (ref 3.5–5.3)
POTASSIUM SERPL-SCNC: 4.6 MMOL/L — SIGNIFICANT CHANGE UP (ref 3.5–5.3)
POTASSIUM UR-SCNC: 20 MMOL/L — SIGNIFICANT CHANGE UP
PROT SERPL-MCNC: 6.8 G/DL — SIGNIFICANT CHANGE UP (ref 6–8.3)
RBC # BLD: 4.65 M/UL — SIGNIFICANT CHANGE UP (ref 4.2–5.8)
RBC # FLD: 11.4 % — SIGNIFICANT CHANGE UP (ref 10.3–14.5)
SODIUM SERPL-SCNC: 130 MMOL/L — LOW (ref 135–145)
SODIUM UR-SCNC: 21 MMOL/L — SIGNIFICANT CHANGE UP
SPECIMEN SOURCE: SIGNIFICANT CHANGE UP
WBC # BLD: 7.2 K/UL — SIGNIFICANT CHANGE UP (ref 3.8–10.5)
WBC # FLD AUTO: 7.2 K/UL — SIGNIFICANT CHANGE UP (ref 3.8–10.5)

## 2018-04-03 PROCEDURE — 99233 SBSQ HOSP IP/OBS HIGH 50: CPT | Mod: GC

## 2018-04-03 RX ORDER — OLANZAPINE 15 MG/1
0 TABLET, FILM COATED ORAL
Qty: 0 | Refills: 0 | COMMUNITY

## 2018-04-03 RX ORDER — PANTOPRAZOLE SODIUM 20 MG/1
40 TABLET, DELAYED RELEASE ORAL ONCE
Qty: 0 | Refills: 0 | Status: COMPLETED | OUTPATIENT
Start: 2018-04-03 | End: 2018-04-03

## 2018-04-03 RX ORDER — IBUPROFEN 200 MG
1 TABLET ORAL
Qty: 0 | Refills: 0 | COMMUNITY

## 2018-04-03 RX ADMIN — Medication 2 MILLIGRAM(S): at 09:42

## 2018-04-03 RX ADMIN — PANTOPRAZOLE SODIUM 40 MILLIGRAM(S): 20 TABLET, DELAYED RELEASE ORAL at 06:55

## 2018-04-03 RX ADMIN — PANTOPRAZOLE SODIUM 40 MILLIGRAM(S): 20 TABLET, DELAYED RELEASE ORAL at 17:16

## 2018-04-03 RX ADMIN — DIVALPROEX SODIUM 500 MILLIGRAM(S): 500 TABLET, DELAYED RELEASE ORAL at 13:59

## 2018-04-03 RX ADMIN — Medication 30 MILLILITER(S): at 17:16

## 2018-04-03 RX ADMIN — Medication 2 MILLIGRAM(S): at 03:41

## 2018-04-03 RX ADMIN — DIVALPROEX SODIUM 500 MILLIGRAM(S): 500 TABLET, DELAYED RELEASE ORAL at 21:59

## 2018-04-03 RX ADMIN — DIVALPROEX SODIUM 500 MILLIGRAM(S): 500 TABLET, DELAYED RELEASE ORAL at 09:47

## 2018-04-03 NOTE — ED ADULT NURSE REASSESSMENT NOTE - TEMPLATE LIST FOR HEAD TO TOE ASSESSMENT
Fluid/Electrolyte/Metabolic

## 2018-04-03 NOTE — BEHAVIORAL HEALTH ASSESSMENT NOTE - HPI (INCLUDE ILLNESS QUALITY, SEVERITY, DURATION, TIMING, CONTEXT, MODIFYING FACTORS, ASSOCIATED SIGNS AND SYMPTOMS)
61 yo M with PMHx of Bipolar DZ with manic episodes hyponatremia with polydipsia and GERD. Pt manic in ED, refusing to be seem.  Patient reportedly started developing hyponatremia few months back with prior hospitalization for it in [place] on 3/24/18 and was discharged on fluid restriction 1l and salt tabs. Pt is non-compliant and drink an excessive amount of water every where he can find, even if the nurses try to keep a close eye on him. His manic episodes became more and more advanced as his hyponatremia worsened.   At this time patient is on 1:1, in no apparent distress, but refuses to engage. No information is available at this time.

## 2018-04-03 NOTE — PROGRESS NOTE ADULT - ATTENDING COMMENTS
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 hx bipolar dis on meds psych dr castro to see pt today .
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
## 2018-04-03 NOTE — PROGRESS NOTE ADULT - SUBJECTIVE AND OBJECTIVE BOX
Patient is a 62y old  Male who presents with a chief complaint of hyponatremia with a sodium of 115.     HPI:  63 yo M with PMHx of Bipolar DZ with manic episodes. hyponatremia with polydipsia, and GERD. Pt manic was manic in ED, refusing to be seem. Called [facility] and spoke on the phone with the PA taking care of him Anthony.  Psych is Dr Zaida Villalpando. She states that pt started developing hyponatremia few months back with prior hospitalization for it in [place] on 3/28/18 and 3/30/18  and was discharged on fluid restriction 1l and salt tabs. Pt is non-compliant and drinks an excessive amount of water every where he can find, even if the nurses try to keep a close eye on him. His manic episodes became more and more advanced as his hyponatremia worsened. HCP is daughter Berlin Burciaga [phone].    INTERVAL HPI/OVERNIGHT EVENTS:  Patient seen and examined. He is aware he is in the hospital and states that he is not confused today. Patient has no complaints this morning. He denies SOB, palpations, abdominal pain, headaches. Patients documents reviewed from [place] show that he was discharged from [facility] on 3/29 with Na of 135 and then returned again and discharged on 3/30/18 with Na of 129      T(C): 36.8 (18 @ 06:53), Max: 36.8 (18 @ 06:53)  HR: 67 (18 @ 06:53) (59 - 78)  BP: 128/78 (18 @ 06:53) (128/78 - 182/88)  RR: 16 (18 @ 06:53) (16 - 18)  SpO2: 98% (18 @ 06:53) (98% - 100%)  Wt(kg): --  I&O's Summary    2018 07:01  -  2018 07:00  --------------------------------------------------------  IN: 840 mL / OUT: 1100 mL / NET: -260 mL    2018 07:01  -  2018 09:32  --------------------------------------------------------  IN: 240 mL / OUT: 1000 mL / NET: -760 mL        REVIEW OF SYSTEMS:  CONSTITUTIONAL: No fever, weight loss, or fatigue  EYES: No eye pain, visual disturbances, or discharge  ENMT:  No difficulty hearing; No sinus or throat pain  NECK: No pain or stiffness  RESPIRATORY: No cough, wheezing, chills or hemoptysis; No shortness of breath  CARDIOVASCULAR: No chest pain, palpitations, dizziness, or leg swelling  GASTROINTESTINAL: No abdominal or epigastric pain. No nausea, vomiting, or hematemesis;  GENITOURINARY: No dysuria, frequency, hematuria, or incontinence  NEUROLOGICAL: No headaches, memory loss, loss of strength, numbness, or tremors  SKIN: No itching, burning, rashes, or lesions   LYMPH NODES: No enlarged glands  MUSCULOSKELETAL: No joint pain or swelling; No muscle, back, or extremity pain  PSYCHIATRIC: No depression, anxiety, mood swings, or difficulty sleeping  ALLERY No hives or eczema    PHYSICAL EXAM:  GENERAL: NAD, well-groomed, well-developed  HEAD:  Atraumatic, Normocephalic  EYES: EOMI, PERRLA, conjunctiva and sclera clear  ENMT: No tonsillar erythema, exudates, or enlargement; Moist mucous membranes,   NECK: Supple, No JVD, Normal thyroid  NERVOUS SYSTEM:  Alert & Oriented X3, Good concentration; Motor Strength 5/5 B/L upper and lower extremities; DTRs 2+ intact and symmetric  CHEST/LUNG: Clear to percussion bilaterally; No rales, rhonchi, wheezing, or rubs  HEART: Regular rate and rhythm; No murmurs, rubs, or gallops  ABDOMEN: Soft, Nontender, Nondistended; Bowel sounds present  EXTREMITIES:  2+ Peripheral Pulses, No clubbing, cyanosis, or edema  LYMPH: No lymphadenopathy noted  SKIN: No rashes or lesions    MEDICATIONS  (STANDING):  diVALproex  milliGRAM(s) Oral <User Schedule>  enoxaparin Injectable 40 milliGRAM(s) SubCutaneous daily  pantoprazole    Tablet 40 milliGRAM(s) Oral before breakfast    MEDICATIONS  (PRN):  acetaminophen   Tablet. 650 milliGRAM(s) Oral every 6 hours PRN Mild Pain (1 - 3)  aluminum hydroxide/magnesium hydroxide/simethicone Suspension 30 milliLiter(s) Oral every 4 hours PRN Dyspepsia  LORazepam     Tablet 2 milliGRAM(s) Oral three times a day PRN Agitation  OLANZapine 5 milliGRAM(s) Oral every 6 hours PRN agitation      LABS:                        13.4   7.2   )-----------( 290      ( 2018 04:34 )             38.6     04-03    130<L>  |  96  |  15  ----------------------------<  105<H>  4.6   |  28  |  0.77    Ca    8.5      2018 04:34  Mg     2.2     04-03    TPro  6.8  /  Alb  3.5  /  TBili  0.6  /  DBili  x   /  AST  22  /  ALT  26  /  AlkPhos  55  04-03      Urinalysis Basic - ( 2018 17:29 )    Color: Pale Yellow / Appearance: Clear / S.000 / pH: x  Gluc: x / Ketone: Negative  / Bili: Negative / Urobili: Negative   Blood: x / Protein: Negative / Nitrite: Negative   Leuk Esterase: Negative / RBC: x / WBC x   Sq Epi: x / Non Sq Epi: x / Bacteria: x      CAPILLARY BLOOD GLUCOSE                  RADIOLOGY & ADDITIONAL TESTS:    Imaging Personally Reviewed:     CXR clear Patient is a 62y old  Male who presents with a chief complaint of hyponatremia with a sodium of 115.     HPI:  63 yo M with PMHx of Bipolar DZ with manic episodes. hyponatremia with polydipsia, and GERD. Pt manic was manic in ED, refusing to be seem. Called [facility] and spoke on the phone with the PA taking care of him Anthony.  Psych is Dr Zaida Villalpando. She states that pt started developing hyponatremia few months back with prior hospitalization for it in [place] on 3/28/18 and 3/30/18  and was discharged on fluid restriction 1l and salt tabs. Pt is non-compliant and drinks an excessive amount of water every where he can find, even if the nurses try to keep a close eye on him. His manic episodes became more and more advanced as his hyponatremia worsened. HCP is daughter Berlin Burciaga [phone].    INTERVAL HPI/OVERNIGHT EVENTS:  Patient seen and examined. He is aware he is in the hospital and states that he is not confused today. Patient has no complaints this morning. He denies SOB, palpations, abdominal pain, headaches. Patients documents reviewed from [place] show that he was discharged from [facility] on 3/29/18 with Na of 135 and then returned again and discharged on 3/30/18 with Na of 129      T(C): 36.8 (18 @ 06:53), Max: 36.8 (18 @ 06:53)  HR: 67 (18 @ 06:53) (59 - 78)  BP: 128/78 (18 @ 06:53) (128/78 - 182/88)  RR: 16 (18 @ 06:53) (16 - 18)  SpO2: 98% (18 @ 06:53) (98% - 100%)  Wt(kg): --  I&O's Summary    2018 07:01  -  2018 07:00  --------------------------------------------------------  IN: 840 mL / OUT: 1100 mL / NET: -260 mL    2018 07:01  -  2018 09:32  --------------------------------------------------------  IN: 240 mL / OUT: 1000 mL / NET: -760 mL        REVIEW OF SYSTEMS:  CONSTITUTIONAL: No fever, weight loss, or fatigue  EYES: No eye pain, visual disturbances, or discharge  ENMT:  No difficulty hearing; No sinus or throat pain  NECK: No pain or stiffness  RESPIRATORY: No cough, wheezing, chills or hemoptysis; No shortness of breath  CARDIOVASCULAR: No chest pain, palpitations, dizziness, or leg swelling  GASTROINTESTINAL: No abdominal or epigastric pain. No nausea, vomiting, or hematemesis;  GENITOURINARY: No dysuria, frequency, hematuria, or incontinence  NEUROLOGICAL: No headaches, memory loss, loss of strength, numbness, or tremors  SKIN: No itching, burning, rashes, or lesions   LYMPH NODES: No enlarged glands  MUSCULOSKELETAL: No joint pain or swelling; No muscle, back, or extremity pain  PSYCHIATRIC: No depression, anxiety, mood swings, or difficulty sleeping  ALLERY No hives or eczema    PHYSICAL EXAM:  GENERAL: NAD, well-groomed, well-developed  HEAD:  Atraumatic, Normocephalic  EYES: EOMI, PERRLA, conjunctiva and sclera clear  ENMT: No tonsillar erythema, exudates, or enlargement; Moist mucous membranes,   NECK: Supple, No JVD, Normal thyroid  NERVOUS SYSTEM:  Alert & Oriented X3, Good concentration  CHEST/LUNG: Clear to percussion bilaterally; No rales, rhonchi, wheezing, or rubs  HEART: Regular rate and rhythm; No murmurs, rubs, or gallops  ABDOMEN: Soft, Nontender, Nondistended; Bowel sounds present  EXTREMITIES:  2+ Peripheral Pulses, No clubbing, cyanosis, or edema  SKIN: No rashes or lesions    MEDICATIONS  (STANDING):  diVALproex  milliGRAM(s) Oral <User Schedule>  enoxaparin Injectable 40 milliGRAM(s) SubCutaneous daily  pantoprazole    Tablet 40 milliGRAM(s) Oral before breakfast    MEDICATIONS  (PRN):  acetaminophen   Tablet. 650 milliGRAM(s) Oral every 6 hours PRN Mild Pain (1 - 3)  aluminum hydroxide/magnesium hydroxide/simethicone Suspension 30 milliLiter(s) Oral every 4 hours PRN Dyspepsia  LORazepam     Tablet 2 milliGRAM(s) Oral three times a day PRN Agitation  OLANZapine 5 milliGRAM(s) Oral every 6 hours PRN agitation      LABS:                        13.4   7.2   )-----------( 290      ( 2018 04:34 )             38.6     04-03    130<L>  |  96  |  15  ----------------------------<  105<H>  4.6   |  28  |  0.77    Ca    8.5      2018 04:34  Mg     2.2     04-03    TPro  6.8  /  Alb  3.5  /  TBili  0.6  /  DBili  x   /  AST  22  /  ALT  26  /  AlkPhos  55  04-03      Urinalysis Basic - ( 2018 17:29 )    Color: Pale Yellow / Appearance: Clear / S.000 / pH: x  Gluc: x / Ketone: Negative  / Bili: Negative / Urobili: Negative   Blood: x / Protein: Negative / Nitrite: Negative   Leuk Esterase: Negative / RBC: x / WBC x   Sq Epi: x / Non Sq Epi: x / Bacteria: x      CAPILLARY BLOOD GLUCOSE  105      RADIOLOGY & ADDITIONAL TESTS: CXR Clear lungs.    Imaging Personally Reviewed: Y    Full Code: Yes Patient is a 62y old  Male who presents with a chief complaint of hyponatremia with a sodium of 115.     HPI:  61 yo M with PMHx of Bipolar DZ with manic episodes. hyponatremia with polydipsia, and GERD. Pt manic was manic in ED, refusing to be seem. Called [facility] and spoke on the phone with the PA taking care of him Carinasami.  Psych is Dr Zaida Villalpando. She states that pt started developing hyponatremia few months back with prior hospitalization for it in [place] on 3/28/18 and 3/30/18  and was discharged on fluid restriction 1l and salt tabs. Pt is non-compliant and drinks an excessive amount of water every where he can find, even if the nurses try to keep a close eye on him. His manic episodes became more and more advanced as his hyponatremia worsened. HCP is daughter Berlin Burciaga [phone]. admitted for acute on chronic hyponatremia etiology SIADH vs psychogenic.       INTERVAL HPI/OVERNIGHT EVENTS:  Patient seen and examined. He is aware he is in the hospital not  confused . Patient has no complaints this morning. He denies SOB, palpations, abdominal pain, headaches. Patients documents reviewed from [place] show that he was discharged from [facility] on 3/29/18 with Na of 135 and then returned again and discharged on 3/30/18 with Na of 129      T(C): 36.8 (18 @ 06:53), Max: 36.8 (18 @ 06:53)  HR: 67 (18 @ 06:53) (59 - 78)  BP: 128/78 (18 @ 06:53) (128/78 - 182/88)  RR: 16 (18 @ 06:53) (16 - 18)  SpO2: 98% (18 @ 06:53) (98% - 100%)  Wt(kg): --  I&O's Summary    2018 07:01  -  2018 07:00  --------------------------------------------------------  IN: 840 mL / OUT: 1100 mL / NET: -260 mL    2018 07:01  -  2018 09:32  --------------------------------------------------------  IN: 240 mL / OUT: 1000 mL / NET: -760 mL        REVIEW OF SYSTEMS:  CONSTITUTIONAL: No fever, weight loss, or fatigue  EYES: No eye pain, visual disturbances, or discharge  ENMT:  No difficulty hearing;  NECK: No pain or stiffness  RESPIRATORY: No cough, wheezing, No shortness of breath  CARDIOVASCULAR: No chest pain, palpitations, dizziness, or leg swelling  GASTROINTESTINAL: No abdominal or epigastric pain. No nausea, vomiting,   GENITOURINARY: No dysuria, frequency,   NEUROLOGICAL: No headaches, memory loss, loss of strength, numbness,   SKIN: No itching, burning, rashes, or lesions   MUSCULOSKELETAL: No joint pain or swelling; No muscle, back, or extremity pain  psych mood stable     PHYSICAL EXAM:  GENERAL: NAD, well-groomed, well-developed  HEAD:  Atraumatic, Normocephalic  EYES: EOMI, PERRLA, conjunctiva and sclera clear  ENMT: Moist mucous membranes,   NECK: Supple, No JVD,   NERVOUS SYSTEM:  Alert & Oriented X3, Good concentration  CHEST/LUNG: Clear to percussion bilaterally; No rales, rhonchi, wheezing  HEART: Regular rate and rhythm; No murmurs,   ABDOMEN: Soft, Nontender, Nondistended; Bowel sounds present  EXTREMITIES:  2+ Peripheral Pulses, No clubbing, cyanosis, or edema  SKIN: No rashes or lesions    MEDICATIONS  (STANDING):  diVALproex  milliGRAM(s) Oral <User Schedule>  enoxaparin Injectable 40 milliGRAM(s) SubCutaneous daily  pantoprazole    Tablet 40 milliGRAM(s) Oral before breakfast    MEDICATIONS  (PRN):  acetaminophen   Tablet. 650 milliGRAM(s) Oral every 6 hours PRN Mild Pain (1 - 3)  aluminum hydroxide/magnesium hydroxide/simethicone Suspension 30 milliLiter(s) Oral every 4 hours PRN Dyspepsia  LORazepam     Tablet 2 milliGRAM(s) Oral three times a day PRN Agitation  OLANZapine 5 milliGRAM(s) Oral every 6 hours PRN agitation      LABS:                        13.4   7.2   )-----------( 290      ( 2018 04:34 )             38.6     04-03    130<L>  |  96  |  15  ----------------------------<  105<H>  4.6   |  28  |  0.77    Ca    8.5      2018 04:34  Mg     2.2     04-03    TPro  6.8  /  Alb  3.5  /  TBili  0.6  /  DBili  x   /  AST  22  /  ALT  26  /  AlkPhos  55  04-03      Urinalysis Basic - ( 2018 17:29 )    Color: Pale Yellow / Appearance: Clear / S.000 / pH: x  Gluc: x / Ketone: Negative  / Bili: Negative / Urobili: Negative   Blood: x / Protein: Negative / Nitrite: Negative   Leuk Esterase: Negative / RBC: x / WBC x   Sq Epi: x / Non Sq Epi: x / Bacteria: x      CAPILLARY BLOOD GLUCOSE  105      RADIOLOGY & ADDITIONAL TESTS: CXR Clear lungs.    Imaging Personally Reviewed: Y    Full Code: Yes

## 2018-04-03 NOTE — ED ADULT NURSE REASSESSMENT NOTE - STATUS
awaiting bed, no change

## 2018-04-03 NOTE — PROGRESS NOTE ADULT - PROBLEM SELECTOR PLAN 2
-c/w with home meds - hold for sedation   -1/1 observation   -Psych- dr castro- agrees with plan. Patient is not psych clear for d/c yet. -c/w with home meds - hold for sedation   -C/w 1/1 observation   -Psych- dr castro- agrees with plan. Patient is not psych clear for d/c yet.

## 2018-04-03 NOTE — BEHAVIORAL HEALTH ASSESSMENT NOTE - NSBHCONSULTRECOMMENDOTHER_PSY_A_CORE FT
Will attempt to obtain collateral information. Left message for Migel eMndez, the number for the daughter is non working.

## 2018-04-03 NOTE — CONSULT NOTE ADULT - SUBJECTIVE AND OBJECTIVE BOX
Nephrology Consultation: MD TREVON MonsalveCANDICE  62y    HPI:  61 yo M with PMHx of Bipolar DZ with manic episodes hyponatremia with polydipsia and GERD. Pt manic in ED, refusing to be seem. Called [facility] and spoke on the phone with the PA taking care of him Anthony.  Psych is Dr Zaida Villalpando. She states that pt stated developing hyponatremia few months back with prior hospitalization for it in [place] on 3/24/18 and was discharged on fluid restriction and salt tabs. Pt is non-compliant and drink an excessive amount of water every where he can find, even if the nurses try to keep a close eye on him. His manic episodes became more and more advanced as his hyponatremia worsened. HCP is daughter Trevon Burciaga [phone].      PAST MEDICAL & SURGICAL HISTORY:  Bipolar 1 disorder  Bipolar 1 disorder  Other schizophrenia  No significant past surgical history      Allergies    No Known Allergies    Intolerances        Home Medications:  Ativan 2 mg oral tablet: 1 tab(s) orally 3 times a day, As Needed (2018 08:29)  Depakote 500 mg oral delayed release tablet: 1 tab(s) orally 3 times a day  Mylanta oral suspension: 30 milliliter(s) orally every 4 hours, As Needed (2018 08:29)  PriLOSEC 20 mg oral delayed release capsule: 1 cap(s) orally once a day (2018 08:29)  ZyPREXA 10 mg oral tablet: orally 2 times a day (2018 08:29)  ZyPREXA 5 mg oral tablet: orally every 6 hours, As Needed (2018 08:29)        FAMILY HISTORY:  No pertinent family history in first degree relatives      SOCIAL HISTORY:    REVIEW OF SYSTEMS:    Constitutional: No fever, weight loss or fatigue  Eyes: No eye pain, visual disturbances, or discharge  ENT:  No difficulty hearing, tinnitus, vertigo; No sinus or throat pain  Neck: No pain or stiffness  Breasts: No pain, masses or nipple discharge  Respiratory: No cough, wheezing, chills or hemoptysis  Cardiovascular: No chest pain, palpitations, shortness of breath, dizziness or leg swelling  Gastrointestinal: No abdominal or epigastric pain. No nausea, vomiting or hematemesis; No diarrhea or constipation. No melena or hematochezia.  Genitourinary: No dysuria, frequency, hematuria or incontinence  Rectal: No pain, hemorrhoids or incontinence  Neurological: No headaches, memory loss, loss of strength, numbness or tremors  Skin: No itching, burning, rashes or lesions   Lymph Nodes: No enlarged glands  Endocrine: No heat or cold intolerance; No hair loss  Musculoskeletal: No joint pain or swelling; No muscle, back or extremity pain  Psychiatric: No depression, anxiety, mood swings or difficulty sleeping  Heme/Lymph: No easy bruising or bleeding gums  Allergy and Immunologic: No hives or eczema    acetaminophen   Tablet. 650 milliGRAM(s) Oral every 6 hours PRN  aluminum hydroxide/magnesium hydroxide/simethicone Suspension 30 milliLiter(s) Oral every 4 hours PRN  diVALproex  milliGRAM(s) Oral <User Schedule>  enoxaparin Injectable 40 milliGRAM(s) SubCutaneous daily  LORazepam     Tablet 2 milliGRAM(s) Oral three times a day PRN  OLANZapine 5 milliGRAM(s) Oral every 6 hours PRN  pantoprazole    Tablet 40 milliGRAM(s) Oral before breakfast      Vital Signs Last 24 Hrs  T(C): 36.8 (2018 06:53), Max: 36.8 (2018 06:53)  T(F): 98.2 (2018 06:53), Max: 98.2 (2018 06:53)  HR: 67 (2018 06:53) (59 - 78)  BP: 128/78 (2018 06:53) (128/78 - 182/88)  BP(mean): --  RR: 16 (2018 06:53) (16 - 18)  SpO2: 98% (2018 06:53) (98% - 100%)    PHYSICAL EXAM:    Constitutional: NAD, well-groomed, well-developed  HEENT: PERRLA, EOMI, Normal Hearing, MMM  Neck: No LAD, No JVD  Back: Normal spine flexure, No CVA tenderness  Respiratory: CTAB/L   Cardiovascular: S1 and S2, RRR, no M/G/R  Gastrointestinal: BS+, soft, NT/ND  Extremities: No peripheral edema  Vascular: 2+ peripheral pulses  Neurological: A/O x 3, no focal deficits  Skin: No rashes      LABS:                        13.4   7.2   )-----------( 290      ( 2018 04:34 )             38.6     04-03    130<L>  |  96  |  15  ----------------------------<  105<H>  4.6   |  28  |  0.77    Ca    8.5      2018 04:34  Mg     2.2     04-03    TPro  6.8  /  Alb  3.5  /  TBili  0.6  /  DBili  x   /  AST  22  /  ALT  26  /  AlkPhos  55  04-03      Urinalysis Basic - ( 2018 17:29 )    Color: Pale Yellow / Appearance: Clear / S.000 / pH: x  Gluc: x / Ketone: Negative  / Bili: Negative / Urobili: Negative   Blood: x / Protein: Negative / Nitrite: Negative   Leuk Esterase: Negative / RBC: x / WBC x   Sq Epi: x / Non Sq Epi: x / Bacteria: x        MICROBIOLOGY:  RECENT CULTURES:        RADIOLOGY & ADDITIONAL STUDIES:

## 2018-04-03 NOTE — PATIENT PROFILE ADULT. - HEALTHCARE INFORMATION NEEDED, PROFILE
" I want my wallet and clothing, I do not want to go back to that place I came from" " I want my wallet and clothing, I do not want to go back to that place I came from". " tell them to tell me what medicine that I am getting before I get it"

## 2018-04-03 NOTE — PROGRESS NOTE ADULT - PROBLEM SELECTOR PLAN 1
-fluid restriction 1 L per day   - Na 130 from 121; continue to monitor BMP; [facility] Ed notes show that patient was not accepted back at [facility] until Na is 135,   -f/u renal-Dr Gary   -urine lytes: Na: 21, K: 20. Low urine sodium more supportive of polydipsia as cause of hyponatremia -fluid restriction 1 L per day   - Na 130 from 121; continue to monitor BMP  -f/u renal-Dr Delcid recs apreciated  -urine lytes: Na: 21, K: 20. Low urine sodium more supportive of polydipsia as cause of hyponatremia -fluid restriction 1 L per day   - Na 130 from 121; continue to monitor BMP  -f/u renal-Dr Delcid gp called

## 2018-04-03 NOTE — ED ADULT NURSE REASSESSMENT NOTE - NS ED NURSE REASSESS COMMENT FT1
pt angry, cursing staff, throwing his tray on the floor. pt medicated per agitation.
report called upstairs,
report given to Floor, pt will go up with 1:1 and transporter. vs stable
report received from SALVADOR Hook, assumed pt care. pt resting comfortably, 1:1 at bs. will continue to monitor
Constant observation maintained.
Pt. noted restless, ambulating in ED. Attempted to assist pt. back to room, pt. resistant stating he was going home. Dr. Aldridge made aware. See orders. Pt. currently in room, constant observation maintained.
Pt. noted restless, constant observation maintained.
Pt. refused Depakote after multiple attempts. Dr. Mejia made aware, no new orders at this time. Constant observation maintained.
Pt. watching tv in room. Constant observation maintained.
Constant observation maintained.
Pt. responding to internal stimuli, accepted medication without manual hold. Constant observation maintained.
constant observation maintained.
Constant observation maintained.
Pt. noted with elevated B/P at 1855. Dr. Velarde made aware. Pt. currently agitated at this time. Constant observation maintained.

## 2018-04-03 NOTE — PROGRESS NOTE ADULT - ASSESSMENT
63 yo M with PMHx of Bipolar DZ with manic episodes hyponatremia with polydipsia and GERD. Pt manic in ED, admitted for acute on chronic hyponatremia etiology SIADH vs psychogenic.

## 2018-04-03 NOTE — CONSULT NOTE ADULT - ASSESSMENT
62 male with a history of MDP now admitted with a history of agitation and persistent hyponatremia. Hyponatremia is most likely due to SIADH due to anti psychotic medications  complicated by psychogenic polydipsia. Patient was given a liter of isotonic saline on admission. Advised on the importance of fluid restriction. Will follow closely.

## 2018-04-04 ENCOUNTER — TRANSCRIPTION ENCOUNTER (OUTPATIENT)
Age: 63
End: 2018-04-04

## 2018-04-04 VITALS — TEMPERATURE: 98 F | RESPIRATION RATE: 17 BRPM | OXYGEN SATURATION: 98 % | HEART RATE: 80 BPM

## 2018-04-04 LAB
ANION GAP SERPL CALC-SCNC: 6 MMOL/L — SIGNIFICANT CHANGE UP (ref 5–17)
BASOPHILS # BLD AUTO: 0.1 K/UL — SIGNIFICANT CHANGE UP (ref 0–0.2)
BASOPHILS NFR BLD AUTO: 1.6 % — SIGNIFICANT CHANGE UP (ref 0–2)
BUN SERPL-MCNC: 14 MG/DL — SIGNIFICANT CHANGE UP (ref 7–23)
CALCIUM SERPL-MCNC: 8.4 MG/DL — LOW (ref 8.5–10.1)
CHLORIDE SERPL-SCNC: 97 MMOL/L — SIGNIFICANT CHANGE UP (ref 96–108)
CO2 SERPL-SCNC: 29 MMOL/L — SIGNIFICANT CHANGE UP (ref 22–31)
CREAT SERPL-MCNC: 1.1 MG/DL — SIGNIFICANT CHANGE UP (ref 0.5–1.3)
EOSINOPHIL # BLD AUTO: 0.6 K/UL — HIGH (ref 0–0.5)
EOSINOPHIL NFR BLD AUTO: 7.1 % — HIGH (ref 0–6)
GLUCOSE SERPL-MCNC: 97 MG/DL — SIGNIFICANT CHANGE UP (ref 70–99)
HCT VFR BLD CALC: 39.2 % — SIGNIFICANT CHANGE UP (ref 39–50)
HGB BLD-MCNC: 13.1 G/DL — SIGNIFICANT CHANGE UP (ref 13–17)
LYMPHOCYTES # BLD AUTO: 2.7 K/UL — SIGNIFICANT CHANGE UP (ref 1–3.3)
LYMPHOCYTES # BLD AUTO: 30.6 % — SIGNIFICANT CHANGE UP (ref 13–44)
MCHC RBC-ENTMCNC: 28.5 PG — SIGNIFICANT CHANGE UP (ref 27–34)
MCHC RBC-ENTMCNC: 33.4 GM/DL — SIGNIFICANT CHANGE UP (ref 32–36)
MCV RBC AUTO: 85.6 FL — SIGNIFICANT CHANGE UP (ref 80–100)
MONOCYTES # BLD AUTO: 1.1 K/UL — HIGH (ref 0–0.9)
MONOCYTES NFR BLD AUTO: 12.7 % — HIGH (ref 1–9)
NEUTROPHILS # BLD AUTO: 4.3 K/UL — SIGNIFICANT CHANGE UP (ref 1.8–7.4)
NEUTROPHILS NFR BLD AUTO: 48 % — SIGNIFICANT CHANGE UP (ref 43–77)
PLATELET # BLD AUTO: 311 K/UL — SIGNIFICANT CHANGE UP (ref 150–400)
POTASSIUM SERPL-MCNC: 4.8 MMOL/L — SIGNIFICANT CHANGE UP (ref 3.5–5.3)
POTASSIUM SERPL-SCNC: 4.8 MMOL/L — SIGNIFICANT CHANGE UP (ref 3.5–5.3)
RBC # BLD: 4.58 M/UL — SIGNIFICANT CHANGE UP (ref 4.2–5.8)
RBC # FLD: 12 % — SIGNIFICANT CHANGE UP (ref 10.3–14.5)
SODIUM SERPL-SCNC: 132 MMOL/L — LOW (ref 135–145)
TSH SERPL-MCNC: 1.38 UIU/ML — SIGNIFICANT CHANGE UP (ref 0.36–3.74)
WBC # BLD: 8.9 K/UL — SIGNIFICANT CHANGE UP (ref 3.8–10.5)
WBC # FLD AUTO: 8.9 K/UL — SIGNIFICANT CHANGE UP (ref 3.8–10.5)

## 2018-04-04 PROCEDURE — 99232 SBSQ HOSP IP/OBS MODERATE 35: CPT

## 2018-04-04 PROCEDURE — 84300 ASSAY OF URINE SODIUM: CPT

## 2018-04-04 PROCEDURE — 87086 URINE CULTURE/COLONY COUNT: CPT

## 2018-04-04 PROCEDURE — 71045 X-RAY EXAM CHEST 1 VIEW: CPT

## 2018-04-04 PROCEDURE — 83735 ASSAY OF MAGNESIUM: CPT

## 2018-04-04 PROCEDURE — 99285 EMERGENCY DEPT VISIT HI MDM: CPT | Mod: 25

## 2018-04-04 PROCEDURE — 81003 URINALYSIS AUTO W/O SCOPE: CPT

## 2018-04-04 PROCEDURE — 80053 COMPREHEN METABOLIC PANEL: CPT

## 2018-04-04 PROCEDURE — 85027 COMPLETE CBC AUTOMATED: CPT

## 2018-04-04 PROCEDURE — 99239 HOSP IP/OBS DSCHRG MGMT >30: CPT

## 2018-04-04 PROCEDURE — 84105 ASSAY OF URINE PHOSPHORUS: CPT

## 2018-04-04 PROCEDURE — 93005 ELECTROCARDIOGRAM TRACING: CPT

## 2018-04-04 PROCEDURE — 84133 ASSAY OF URINE POTASSIUM: CPT

## 2018-04-04 PROCEDURE — 84443 ASSAY THYROID STIM HORMONE: CPT

## 2018-04-04 PROCEDURE — 80048 BASIC METABOLIC PNL TOTAL CA: CPT

## 2018-04-04 PROCEDURE — 83935 ASSAY OF URINE OSMOLALITY: CPT

## 2018-04-04 RX ADMIN — ENOXAPARIN SODIUM 40 MILLIGRAM(S): 100 INJECTION SUBCUTANEOUS at 12:02

## 2018-04-04 RX ADMIN — DIVALPROEX SODIUM 500 MILLIGRAM(S): 500 TABLET, DELAYED RELEASE ORAL at 12:03

## 2018-04-04 RX ADMIN — PANTOPRAZOLE SODIUM 40 MILLIGRAM(S): 20 TABLET, DELAYED RELEASE ORAL at 06:41

## 2018-04-04 NOTE — DISCHARGE NOTE ADULT - CARE PLAN
Principal Discharge DX:	Hyponatremia  Goal:	Resolution  Assessment and plan of treatment:	This was likely due to drinking excess amounts of water in addition to effects of your psychiatric medications. Please refrain from drinking over 1 liter of water a day. Please follow up with your PMD in 1 to 2 weeks after discharge  Secondary Diagnosis:	Bipolar 1 disorder  Assessment and plan of treatment:	Please continue to take all your psych medications as prescribed. No changes were made to your psychiatric medications during your stay  Secondary Diagnosis:	Other schizophrenia  Assessment and plan of treatment:	Please continue to take all your psych medications as prescribed. No changes were made to your psychiatric medications during your stay  Secondary Diagnosis:	GERD without esophagitis  Assessment and plan of treatment:	Continue to take your prilosec as prescribed Principal Discharge DX:	Hyponatremia  Goal:	Resolution  Assessment and plan of treatment:	This was likely due to drinking excess amounts of water in addition to effects of your psychiatric medications. Please refrain from drinking over 1 liter of water a day. Continue to take salt tabs 1 gram 3 times a day. Please follow up with your PMD in 1 to 2 weeks after discharge  Secondary Diagnosis:	Bipolar 1 disorder  Assessment and plan of treatment:	Please continue to take all your psych medications as prescribed. No changes were made to your psychiatric medications during your stay  Secondary Diagnosis:	Other schizophrenia  Assessment and plan of treatment:	Please continue to take all your psych medications as prescribed. No changes were made to your psychiatric medications during your stay  Secondary Diagnosis:	GERD without esophagitis  Assessment and plan of treatment:	Continue to take your prilosec and mylanta. Principal Discharge DX:	Hyponatremia  Goal:	Resolution chr secondary to SIDAH AND  COMPONENT PSYCHOGENIC POLYDIPSIA  Assessment and plan of treatment:	This was likely due to drinking excess amounts of water in addition to effects of your psychiatric medications. Please refrain from drinking over 1 liter of water a day. Continue to take salt tabs 1 gram 3 times a day. Please follow up with your PMD in 1 to 2 weeks after discharge  Secondary Diagnosis:	Bipolar 1 disorder  Assessment and plan of treatment:	Please continue to take all your psych medications as prescribed. No changes were made to your psychiatric medications during your stay  Secondary Diagnosis:	Other schizophrenia  Assessment and plan of treatment:	Please continue to take all your psych medications as prescribed. No changes were made to your psychiatric medications during your stay  Secondary Diagnosis:	GERD without esophagitis  Assessment and plan of treatment:	Continue to take your prilosec and mylanta.

## 2018-04-04 NOTE — DISCHARGE NOTE ADULT - ADDITIONAL INSTRUCTIONS
Please limit your fluid intake to 1 Liter per day. Continue to take your salt tablets 1g three times a day. Continue taking all your psychiatric medications with the same regimen as before- no changes were made during your stay

## 2018-04-04 NOTE — DISCHARGE NOTE ADULT - CARE PROVIDER_API CALL
Norberto Cerda), Psychiatry  [address] [place] [address]  Phone: [phone]  Fax: [phone]

## 2018-04-04 NOTE — DISCHARGE NOTE ADULT - PATIENT PORTAL LINK FT
You can access the [employer] Patient Portal, offered by [facility], by registering with the following website: [email or web address]

## 2018-04-04 NOTE — PROGRESS NOTE ADULT - SUBJECTIVE AND OBJECTIVE BOX
CANDICE LESTER  62y  Male    Patient is a 62y old  Male who presents with a chief complaint of hyponatremia 115 in am (2018 16:07)    ambulating in the hallway.   feels well.      PAST MEDICAL & SURGICAL HISTORY:  Bipolar 1 disorder  Bipolar 1 disorder  Other schizophrenia  No significant past surgical history          PHYSICAL EXAM:    T(C): 36.6 (18 @ 06:11), Max: 37.1 (18 @ 14:10)  HR: 64 (18 @ 06:11) (64 - 72)  BP: 118/70 (18 @ 06:11) (118/70 - 142/68)  RR: 16 (18 @ 06:11) (16 - 18)  SpO2: 98% (18 @ 06:11) (98% - 100%)  Wt(kg): --    I&O's Detail    2018 07:  -  2018 07:00  --------------------------------------------------------  IN:    Oral Fluid: 720 mL  Total IN: 720 mL    OUT:    Voided: 1600 mL  Total OUT: 1600 mL    Total NET: -880 mL      2018 07:01  -  2018 11:15  --------------------------------------------------------  IN:    Oral Fluid: 300 mL  Total IN: 300 mL    OUT:  Total OUT: 0 mL    Total NET: 300 mL          Respiratory: clear anteriorly, decreased BS at bases  Cardiovascular: S1 S2  Gastrointestinal: soft NT ND +BS  Extremities: edema   Neuro: Awake and alert    MEDICATIONS  (STANDING):  diVALproex  milliGRAM(s) Oral <User Schedule>  enoxaparin Injectable 40 milliGRAM(s) SubCutaneous daily  pantoprazole    Tablet 40 milliGRAM(s) Oral before breakfast    MEDICATIONS  (PRN):  acetaminophen   Tablet. 650 milliGRAM(s) Oral every 6 hours PRN Mild Pain (1 - 3)  aluminum hydroxide/magnesium hydroxide/simethicone Suspension 30 milliLiter(s) Oral every 4 hours PRN Dyspepsia  LORazepam     Tablet 2 milliGRAM(s) Oral three times a day PRN Agitation  OLANZapine 5 milliGRAM(s) Oral every 6 hours PRN agitation                            13.1   8.9   )-----------( 311      ( 2018 06:08 )             39.2       04-04    132<L>  |  97  |  14  ----------------------------<  97  4.8   |  29  |  1.10    Ca    8.4<L>      2018 06:08  Mg     2.2     04-03    TPro  6.8  /  Alb  3.5  /  TBili  0.6  /  DBili  x   /  AST  22  /  ALT  26  /  AlkPhos  55  04-03      Urinalysis Basic - ( 2018 17:29 )    Color: Pale Yellow / Appearance: Clear / S.000 / pH: x  Gluc: x / Ketone: Negative  / Bili: Negative / Urobili: Negative   Blood: x / Protein: Negative / Nitrite: Negative   Leuk Esterase: Negative / RBC: x / WBC x   Sq Epi: x / Non Sq Epi: x / Bacteria: x

## 2018-04-04 NOTE — PROGRESS NOTE ADULT - ASSESSMENT
62 male with a history of MDP now admitted with a history of agitation and persistent hyponatremia. Hyponatremia is most likely due to SIADH due to anti psychotic medications  complicated by psychogenic polydipsia. Patient was given a liter of isotonic saline on admission. Advised on the importance of fluid restriction. No objection to discharge him to in patient psych facility.

## 2018-04-04 NOTE — DISCHARGE NOTE ADULT - PLAN OF CARE
Resolution This was likely due to drinking excess amounts of water in addition to effects of your psychiatric medications. Please refrain from drinking over 1 liter of water a day. Please follow up with your PMD in 1 to 2 weeks after discharge Please continue to take all your psych medications as prescribed. No changes were made to your psychiatric medications during your stay Continue to take your prilosec as prescribed This was likely due to drinking excess amounts of water in addition to effects of your psychiatric medications. Please refrain from drinking over 1 liter of water a day. Continue to take salt tabs 1 gram 3 times a day. Please follow up with your PMD in 1 to 2 weeks after discharge Continue to take your prilosec and mylanta. Resolution chr secondary to SIDAH AND  COMPONENT PSYCHOGENIC POLYDIPSIA

## 2018-04-04 NOTE — DISCHARGE NOTE ADULT - MEDICATION SUMMARY - MEDICATIONS TO TAKE
I will START or STAY ON the medications listed below when I get home from the hospital:    Motrin 400 mg oral tablet  -- 1 tab(s) by mouth every 6 hours, As Needed  -- Indication: For Pain    Mylanta oral suspension  -- 30 milliliter(s) by mouth every 4 hours, As Needed  -- Indication: For GERD without esophagitis    Depakote 500 mg oral delayed release tablet  -- 1 tab(s) by mouth 3 times a day    10a, 2p, 9 p   -- Indication: For Bipolar 1 disorder    Ativan 2 mg oral tablet  -- 1 tab(s) by mouth 3 times a day, As Needed  -- Indication: For Bipolar 1 disorder    ZyPREXA Zydis 10 mg oral tablet, disintegrating  -- 1 tab(s) by mouth 2 times a day(10AM & 9PM)  -- Indication: For Bipolar 1 disorder    ZyPREXA Zydis 5 mg oral tablet, disintegrating  -- 1 tab(s) by mouth every 6 hours, As Needed  -- Indication: For Bipolar 1 disorder    Sodium Chloride 1 g oral tablet  -- 1 tab(s) by mouth 3 times a day  -- Indication: For Hyponatremia    PriLOSEC 20 mg oral delayed release capsule  -- 1 cap(s) by mouth once a day  -- Indication: For GERD without esophagitis

## 2018-04-12 ENCOUNTER — INPATIENT (INPATIENT)
Facility: HOSPITAL | Age: 63
LOS: 3 days | Discharge: PSYCHIATRIC OR SUBSTANCE USE FACILITY | DRG: 641 | End: 2018-04-16
Attending: HOSPITALIST | Admitting: INTERNAL MEDICINE
Payer: MEDICARE

## 2018-04-12 VITALS
OXYGEN SATURATION: 99 % | RESPIRATION RATE: 18 BRPM | HEIGHT: 67 IN | SYSTOLIC BLOOD PRESSURE: 185 MMHG | WEIGHT: 154.98 LBS | HEART RATE: 76 BPM | TEMPERATURE: 99 F | DIASTOLIC BLOOD PRESSURE: 82 MMHG

## 2018-04-12 DIAGNOSIS — E87.1 HYPO-OSMOLALITY AND HYPONATREMIA: ICD-10-CM

## 2018-04-12 PROBLEM — F31.9 BIPOLAR DISORDER, UNSPECIFIED: Chronic | Status: ACTIVE | Noted: 2018-04-02

## 2018-04-12 LAB
ALBUMIN SERPL ELPH-MCNC: 3.7 G/DL — SIGNIFICANT CHANGE UP (ref 3.3–5.2)
ALP SERPL-CCNC: 51 U/L — SIGNIFICANT CHANGE UP (ref 40–120)
ALT FLD-CCNC: 13 U/L — SIGNIFICANT CHANGE UP
ANION GAP SERPL CALC-SCNC: 12 MMOL/L — SIGNIFICANT CHANGE UP (ref 5–17)
APPEARANCE UR: CLEAR — SIGNIFICANT CHANGE UP
AST SERPL-CCNC: 32 U/L — SIGNIFICANT CHANGE UP
BASOPHILS # BLD AUTO: 0 K/UL — SIGNIFICANT CHANGE UP (ref 0–0.2)
BASOPHILS NFR BLD AUTO: 0.4 % — SIGNIFICANT CHANGE UP (ref 0–2)
BILIRUB SERPL-MCNC: 0.2 MG/DL — LOW (ref 0.4–2)
BILIRUB UR-MCNC: NEGATIVE — SIGNIFICANT CHANGE UP
BUN SERPL-MCNC: 12 MG/DL — SIGNIFICANT CHANGE UP (ref 8–20)
CALCIUM SERPL-MCNC: 8.6 MG/DL — SIGNIFICANT CHANGE UP (ref 8.6–10.2)
CHLORIDE SERPL-SCNC: 82 MMOL/L — LOW (ref 98–107)
CO2 SERPL-SCNC: 23 MMOL/L — SIGNIFICANT CHANGE UP (ref 22–29)
COLOR SPEC: YELLOW — SIGNIFICANT CHANGE UP
CREAT SERPL-MCNC: 0.67 MG/DL — SIGNIFICANT CHANGE UP (ref 0.5–1.3)
DIFF PNL FLD: NEGATIVE — SIGNIFICANT CHANGE UP
EOSINOPHIL # BLD AUTO: 0.3 K/UL — SIGNIFICANT CHANGE UP (ref 0–0.5)
EOSINOPHIL NFR BLD AUTO: 3 % — SIGNIFICANT CHANGE UP (ref 0–6)
GLUCOSE SERPL-MCNC: 94 MG/DL — SIGNIFICANT CHANGE UP (ref 70–115)
GLUCOSE UR QL: NEGATIVE MG/DL — SIGNIFICANT CHANGE UP
HCT VFR BLD CALC: 34.8 % — LOW (ref 42–52)
HGB BLD-MCNC: 12.2 G/DL — LOW (ref 14–18)
KETONES UR-MCNC: NEGATIVE — SIGNIFICANT CHANGE UP
LEUKOCYTE ESTERASE UR-ACNC: NEGATIVE — SIGNIFICANT CHANGE UP
LYMPHOCYTES # BLD AUTO: 1.7 K/UL — SIGNIFICANT CHANGE UP (ref 1–4.8)
LYMPHOCYTES # BLD AUTO: 18.2 % — LOW (ref 20–55)
MAGNESIUM SERPL-MCNC: 1.8 MG/DL — SIGNIFICANT CHANGE UP (ref 1.6–2.6)
MCHC RBC-ENTMCNC: 28 PG — SIGNIFICANT CHANGE UP (ref 27–31)
MCHC RBC-ENTMCNC: 35.1 G/DL — SIGNIFICANT CHANGE UP (ref 32–36)
MCV RBC AUTO: 79.8 FL — LOW (ref 80–94)
MONOCYTES # BLD AUTO: 1.3 K/UL — HIGH (ref 0–0.8)
MONOCYTES NFR BLD AUTO: 13.7 % — HIGH (ref 3–10)
NEUTROPHILS # BLD AUTO: 6.1 K/UL — SIGNIFICANT CHANGE UP (ref 1.8–8)
NEUTROPHILS NFR BLD AUTO: 64.5 % — SIGNIFICANT CHANGE UP (ref 37–73)
NITRITE UR-MCNC: NEGATIVE — SIGNIFICANT CHANGE UP
PH UR: 8 — SIGNIFICANT CHANGE UP (ref 5–8)
PHOSPHATE SERPL-MCNC: 3.8 MG/DL — SIGNIFICANT CHANGE UP (ref 2.4–4.7)
PLATELET # BLD AUTO: 181 K/UL — SIGNIFICANT CHANGE UP (ref 150–400)
POTASSIUM SERPL-MCNC: 4.6 MMOL/L — SIGNIFICANT CHANGE UP (ref 3.5–5.3)
POTASSIUM SERPL-MCNC: 5.7 MMOL/L — HIGH (ref 3.5–5.3)
POTASSIUM SERPL-SCNC: 4.6 MMOL/L — SIGNIFICANT CHANGE UP (ref 3.5–5.3)
POTASSIUM SERPL-SCNC: 5.7 MMOL/L — HIGH (ref 3.5–5.3)
PROT SERPL-MCNC: 6.5 G/DL — LOW (ref 6.6–8.7)
PROT UR-MCNC: NEGATIVE MG/DL — SIGNIFICANT CHANGE UP
RBC # BLD: 4.36 M/UL — LOW (ref 4.6–6.2)
RBC # FLD: 13 % — SIGNIFICANT CHANGE UP (ref 11–15.6)
SODIUM SERPL-SCNC: 117 MMOL/L — CRITICAL LOW (ref 135–145)
SP GR SPEC: 1.01 — SIGNIFICANT CHANGE UP (ref 1.01–1.02)
UROBILINOGEN FLD QL: NEGATIVE MG/DL — SIGNIFICANT CHANGE UP
WBC # BLD: 9.4 K/UL — SIGNIFICANT CHANGE UP (ref 4.8–10.8)
WBC # FLD AUTO: 9.4 K/UL — SIGNIFICANT CHANGE UP (ref 4.8–10.8)

## 2018-04-12 PROCEDURE — 99222 1ST HOSP IP/OBS MODERATE 55: CPT

## 2018-04-12 PROCEDURE — 99285 EMERGENCY DEPT VISIT HI MDM: CPT

## 2018-04-12 RX ORDER — IBUPROFEN 200 MG
400 TABLET ORAL EVERY 6 HOURS
Qty: 0 | Refills: 0 | Status: DISCONTINUED | OUTPATIENT
Start: 2018-04-12 | End: 2018-04-12

## 2018-04-12 RX ORDER — SODIUM CHLORIDE 5 G/100ML
500 INJECTION, SOLUTION INTRAVENOUS
Qty: 0 | Refills: 0 | Status: DISCONTINUED | OUTPATIENT
Start: 2018-04-12 | End: 2018-04-13

## 2018-04-12 RX ORDER — DIVALPROEX SODIUM 500 MG/1
500 TABLET, DELAYED RELEASE ORAL THREE TIMES A DAY
Qty: 0 | Refills: 0 | Status: DISCONTINUED | OUTPATIENT
Start: 2018-04-12 | End: 2018-04-16

## 2018-04-12 RX ORDER — SODIUM CHLORIDE 9 MG/ML
1 INJECTION INTRAMUSCULAR; INTRAVENOUS; SUBCUTANEOUS THREE TIMES A DAY
Qty: 0 | Refills: 0 | Status: DISCONTINUED | OUTPATIENT
Start: 2018-04-12 | End: 2018-04-16

## 2018-04-12 RX ORDER — SODIUM CHLORIDE 9 MG/ML
1000 INJECTION INTRAMUSCULAR; INTRAVENOUS; SUBCUTANEOUS
Qty: 0 | Refills: 0 | Status: DISCONTINUED | OUTPATIENT
Start: 2018-04-12 | End: 2018-04-12

## 2018-04-12 RX ORDER — PANTOPRAZOLE SODIUM 20 MG/1
40 TABLET, DELAYED RELEASE ORAL
Qty: 0 | Refills: 0 | Status: DISCONTINUED | OUTPATIENT
Start: 2018-04-12 | End: 2018-04-16

## 2018-04-12 RX ORDER — OLANZAPINE 15 MG/1
5 TABLET, FILM COATED ORAL EVERY 6 HOURS
Qty: 0 | Refills: 0 | Status: DISCONTINUED | OUTPATIENT
Start: 2018-04-12 | End: 2018-04-13

## 2018-04-12 RX ORDER — OLANZAPINE 15 MG/1
10 TABLET, FILM COATED ORAL
Qty: 0 | Refills: 0 | Status: DISCONTINUED | OUTPATIENT
Start: 2018-04-12 | End: 2018-04-13

## 2018-04-12 RX ORDER — ENOXAPARIN SODIUM 100 MG/ML
40 INJECTION SUBCUTANEOUS EVERY 24 HOURS
Qty: 0 | Refills: 0 | Status: DISCONTINUED | OUTPATIENT
Start: 2018-04-12 | End: 2018-04-16

## 2018-04-12 RX ADMIN — SODIUM CHLORIDE 1 GRAM(S): 9 INJECTION INTRAMUSCULAR; INTRAVENOUS; SUBCUTANEOUS at 20:48

## 2018-04-12 RX ADMIN — SODIUM CHLORIDE 50 MILLILITER(S): 5 INJECTION, SOLUTION INTRAVENOUS at 20:48

## 2018-04-12 RX ADMIN — DIVALPROEX SODIUM 500 MILLIGRAM(S): 500 TABLET, DELAYED RELEASE ORAL at 20:48

## 2018-04-12 NOTE — CONSULT NOTE ADULT - SUBJECTIVE AND OBJECTIVE BOX
Patient is a 63y old  Male who presents with a chief complaint of abnormal labs (2018 18:07)      HPI:  This is 64 yo M with PMHx of Bipolar DZ with manic episodes, chronic hyponatremia with polydipsia and GERD was sent from [facility] due to hyponatremia 117. Pt had routine blood work at [facility] showed hyponatremia and hyperkalemia thats why he sent ED. Labs here showed sodium 117, K 4.6. He was recently hospitalized with similar problem discharged on salt tablet on 2018. Pt has h/o polydipsia, cont to drink plenty of water at [facility], per patient  medication makes him thirsty thats why he drinking plenty of water.    Denied chest pain, SOB, abd. pain, nausea, vomiting, headache, dizziness, urinary and bowel complaints. (2018 18:07)   .    PAST MEDICAL & SURGICAL HISTORY:  Bipolar 1 disorder  Bipolar 1 disorder  Other schizophrenia  No significant past surgical history      FAMILY HISTORY:  No pertinent family history in first degree relatives  [place]    Social History: resident [facility]    MEDICATIONS  (STANDING):  diVALproex  milliGRAM(s) Oral three times a day  enoxaparin Injectable 40 milliGRAM(s) SubCutaneous every 24 hours  OLANZapine Disintegrating Tablet 10 milliGRAM(s) Oral two times a day  pantoprazole    Tablet 40 milliGRAM(s) Oral before breakfast  sodium chloride 1 Gram(s) Oral three times a day  sodium chloride 1.5%. 500 milliLiter(s) (50 mL/Hr) IV Continuous <Continuous>    MEDICATIONS  (PRN):  LORazepam     Tablet 2 milliGRAM(s) Oral three times a day PRN Agitation  OLANZapine Disintegrating Tablet 5 milliGRAM(s) Oral every 6 hours PRN agitation   Meds reviewed    Allergies    No Known Allergies    Intolerances      REVIEW OF SYSTEMS:    CONSTITUTIONAL:  denies pain  EYES: No eye pain, visual disturbances, or discharge  ENMT:  No difficulty hearing, tinnitus, vertigo; No sinus or throat pain  NECK: No pain or stiffness  BREASTS: No pain, masses, or nipple discharge  RESPIRATORY: Not  sob  CARDIOVASCULAR: No chest pain, palpitations, dizziness,   GASTROINTESTINAL: No abdominal or epigastric pain. No nausea, vomiting, or hematemesis; No diarrhea or constipation. No melena  GENITOURINARY: No dysuria, frequency, hematuria, or incontinence  NEUROLOGICAL: No headaches, memory loss, loss of strength, numbness, or tremors  SKIN: neg  LYMPH NODES: No enlarged glands  ENDOCRINE: No heat or cold intolerance; No hair loss  MUSCULOSKELETAL: Occasional jt pains  PSYCHIATRIC: No depression, anxiety, mood swings, or difficulty sleeping  HEME/LYMPH: No easy bruising, or bleeding gums  ALLERGY  AND IMMUNOLOGIC: No hives or eczema      Vital Signs Last 24 Hrs  T(C): 37.1 (2018 12:57), Max: 37.1 (2018 12:57)  T(F): 98.7 (2018 12:57), Max: 98.7 (2018 12:57)  HR: 69 (2018 15:31) (69 - 76)  BP: 156/70 (2018 15:31) (156/70 - 185/82)  BP(mean): --  RR: 16 (2018 15:31) (16 - 18)  SpO2: 99% (2018 15:31) (99% - 99%)  Daily Height in cm: 170.18 (2018 12:57)        PHYSICAL EXAM:    GENERAL: appears chronically ill,   HEAD:  Atraumatic, Normocephalic  EYES: EOMI, PERRLA, conjunctiva and sclera clear  ENMT: No tonsillar erythema, exudates, or enlargement; Moist mucous membranes,   NECK: Supple, neck  veins flat  NERVOUS SYSTEM:  Alert & Verbal X3, Good concentration; Motor Strength wnl upper and lower extremities; Bipolar  comfortable  CHEST/LUNG: Clear to percussion bilaterally; No rales, rhonchi, wheezing, or rubs  HEART: Regular rate and rhythm; No murmurs, rubs, or gallops  ABDOMEN: Soft, Nontender, Nondistended; Bowel sounds present, body wall and flank edema  EXTREMITIES:  No edema; Scar right ankle  LYMPH: No lymphadenopathy noted  SKIN: No rashes or lesions, pale   clear , pale  urine passed    LABS:                        12.2   9.4   )-----------( 181      ( 2018 14:38 )             34.8     04-12    x   |  x   |  x   ----------------------------<  x   4.6   |  x   |  x     Ca    8.6      2018 14:38  Phos  3.8     04-12  Mg     1.8     04-12    TPro  6.5<L>  /  Alb  3.7  /  TBili  0.2<L>  /  DBili  x   /  AST  32  /  ALT  13  /  AlkPhos  51        Urinalysis Basic - ( 2018 16:59 )    Color: Yellow / Appearance: Clear / S.010 / pH: x  Gluc: x / Ketone: Negative  / Bili: Negative / Urobili: Negative mg/dL   Blood: x / Protein: Negative mg/dL / Nitrite: Negative   Leuk Esterase: Negative / RBC: x / WBC x   Sq Epi: x / Non Sq Epi: x / Bacteria: x      Magnesium, Serum: 1.8 mg/dL ( @ 14:38)  Phosphorus Level, Serum: 3.8 mg/dL ( @ 14:38)          RADIOLOGY & ADDITIONAL TESTS:

## 2018-04-12 NOTE — ED PROVIDER NOTE - MEDICAL DECISION MAKING DETAILS
Outpatient labs of hyperkalemia patient asymptomatic.  Patient placed on monitor.  Will check EKG, Labs, and Re-eval.

## 2018-04-12 NOTE — H&P ADULT - ASSESSMENT
This is 62 yo M with PMHx of Bipolar DZ with manic episodes, chronic hyponatremia with polydipsia and GERD was sent from [facility] due to hyponatremia 117. Pt had routine blood work at [facility] showed hyponatremia and hyperkalemia thats why he sent ED. Labs here showed sodium 117, K 4.6. He was recently hospitalized with similar problem discharged on salt tablet on 04/04/2018. Pt has h/o polydipsia, cont to drink plenty of water at [facility], per patient  medication makes him thirsty thats why he drinking plenty of water.    A/P    >Hyponatremia due to psychogenic polydipsia - admit to medicine  renal consulted -Discussed with Dr. Rm - fluid per renal   urine electrolytes   fluid restriction   c/w salt tablets  f/u BMP     >Bipolar disorder - stable  c/w home medication     >GERD - PPI    DVT PPX- Lovenox

## 2018-04-12 NOTE — ED ADULT NURSE NOTE - OBJECTIVE STATEMENT
patient agitated refuses to speak when asked questions. iv attempted, iv unsuccessful due to patient ripping it out. patient has a [facility] 1:1 at bedside. as per 1:1 patient brought for abnormal labs.

## 2018-04-12 NOTE — ED PROVIDER NOTE - OBJECTIVE STATEMENT
This patient is a 63 year old man hx of bipolar disorder recently discharged from [facility] 8 days ago This patient is a 63 year old man hx of bipolar disorder recently discharged from [facility] 8 days ago for hyponatremia secondary to psychogenic polydipsia.  As per documentation from [facility] patient also had hyperkalemia.  He has no complaints at this time.  He denies palpitations, SOB, chest pain, nausea, and vomiting.

## 2018-04-12 NOTE — ED ADULT NURSE NOTE - CHIEF COMPLAINT QUOTE
Patient BIBA, sent from [facility] for abnormal labwork, ems stating staff stated high potassium level and low sodium level, patient offering no complaints at this time

## 2018-04-12 NOTE — ED ADULT TRIAGE NOTE - CHIEF COMPLAINT QUOTE
Patient BIBA, sent from [facility] for abnormal labwork, ems stating staff stated high potassium level and low sodium level, patient offering no complaints at this time

## 2018-04-12 NOTE — H&P ADULT - HISTORY OF PRESENT ILLNESS
This is 64 yo M with PMHx of Bipolar DZ with manic episodes, chronic hyponatremia with polydipsia and GERD was sent from [facility] due to hyponatremia 117. Pt had routine blood work at [facility] showed hyponatremia and hyperkalemia thats why he sent ED. Labs here showed sodium 117, K 4.6. He was recently hospitalized with similar problem discharged on salt tablet on 04/04/2018. Pt has h/o polydipsia, cont to drink plenty of water at [facility], per patient  medication makes him thirsty thats why he drinking plenty of water.    Denied chest pain, SOB, abd. pain, nausea, vomiting, headache, dizziness, urinary and bowel complaints.

## 2018-04-13 LAB
ALBUMIN SERPL ELPH-MCNC: 3.9 G/DL — SIGNIFICANT CHANGE UP (ref 3.3–5.2)
ALP SERPL-CCNC: 55 U/L — SIGNIFICANT CHANGE UP (ref 40–120)
ALT FLD-CCNC: 11 U/L — SIGNIFICANT CHANGE UP
ANION GAP SERPL CALC-SCNC: 10 MMOL/L — SIGNIFICANT CHANGE UP (ref 5–17)
ANION GAP SERPL CALC-SCNC: 11 MMOL/L — SIGNIFICANT CHANGE UP (ref 5–17)
ANION GAP SERPL CALC-SCNC: 9 MMOL/L — SIGNIFICANT CHANGE UP (ref 5–17)
AST SERPL-CCNC: 16 U/L — SIGNIFICANT CHANGE UP
BILIRUB SERPL-MCNC: 0.3 MG/DL — LOW (ref 0.4–2)
BUN SERPL-MCNC: 12 MG/DL — SIGNIFICANT CHANGE UP (ref 8–20)
BUN SERPL-MCNC: 13 MG/DL — SIGNIFICANT CHANGE UP (ref 8–20)
BUN SERPL-MCNC: 14 MG/DL — SIGNIFICANT CHANGE UP (ref 8–20)
CALCIUM SERPL-MCNC: 8.4 MG/DL — LOW (ref 8.6–10.2)
CALCIUM SERPL-MCNC: 8.5 MG/DL — LOW (ref 8.6–10.2)
CALCIUM SERPL-MCNC: 8.7 MG/DL — SIGNIFICANT CHANGE UP (ref 8.6–10.2)
CHLORIDE SERPL-SCNC: 92 MMOL/L — LOW (ref 98–107)
CHLORIDE SERPL-SCNC: 93 MMOL/L — LOW (ref 98–107)
CHLORIDE SERPL-SCNC: 96 MMOL/L — LOW (ref 98–107)
CO2 SERPL-SCNC: 25 MMOL/L — SIGNIFICANT CHANGE UP (ref 22–29)
CO2 SERPL-SCNC: 26 MMOL/L — SIGNIFICANT CHANGE UP (ref 22–29)
CO2 SERPL-SCNC: 28 MMOL/L — SIGNIFICANT CHANGE UP (ref 22–29)
CORTIS AM PEAK SERPL-MCNC: 11.9 UG/DL — SIGNIFICANT CHANGE UP (ref 6–18.4)
CREAT ?TM UR-MCNC: 37 MG/DL — SIGNIFICANT CHANGE UP
CREAT SERPL-MCNC: 0.66 MG/DL — SIGNIFICANT CHANGE UP (ref 0.5–1.3)
CREAT SERPL-MCNC: 0.68 MG/DL — SIGNIFICANT CHANGE UP (ref 0.5–1.3)
CREAT SERPL-MCNC: 0.84 MG/DL — SIGNIFICANT CHANGE UP (ref 0.5–1.3)
GLUCOSE SERPL-MCNC: 126 MG/DL — HIGH (ref 70–115)
GLUCOSE SERPL-MCNC: 98 MG/DL — SIGNIFICANT CHANGE UP (ref 70–115)
GLUCOSE SERPL-MCNC: 98 MG/DL — SIGNIFICANT CHANGE UP (ref 70–115)
HCT VFR BLD CALC: 38.4 % — LOW (ref 42–52)
HGB BLD-MCNC: 13.2 G/DL — LOW (ref 14–18)
MAGNESIUM SERPL-MCNC: 2.2 MG/DL — SIGNIFICANT CHANGE UP (ref 1.8–2.6)
MCHC RBC-ENTMCNC: 28.4 PG — SIGNIFICANT CHANGE UP (ref 27–31)
MCHC RBC-ENTMCNC: 34.4 G/DL — SIGNIFICANT CHANGE UP (ref 32–36)
MCV RBC AUTO: 82.6 FL — SIGNIFICANT CHANGE UP (ref 80–94)
OSMOLALITY UR: 269 MOSM/KG — LOW (ref 300–1000)
PLATELET # BLD AUTO: 334 K/UL — SIGNIFICANT CHANGE UP (ref 150–400)
POTASSIUM SERPL-MCNC: 4.5 MMOL/L — SIGNIFICANT CHANGE UP (ref 3.5–5.3)
POTASSIUM SERPL-MCNC: 4.6 MMOL/L — SIGNIFICANT CHANGE UP (ref 3.5–5.3)
POTASSIUM SERPL-MCNC: 4.9 MMOL/L — SIGNIFICANT CHANGE UP (ref 3.5–5.3)
POTASSIUM SERPL-SCNC: 4.5 MMOL/L — SIGNIFICANT CHANGE UP (ref 3.5–5.3)
POTASSIUM SERPL-SCNC: 4.6 MMOL/L — SIGNIFICANT CHANGE UP (ref 3.5–5.3)
POTASSIUM SERPL-SCNC: 4.9 MMOL/L — SIGNIFICANT CHANGE UP (ref 3.5–5.3)
POTASSIUM UR-SCNC: 17 MMOL/L — SIGNIFICANT CHANGE UP
PROT SERPL-MCNC: 7 G/DL — SIGNIFICANT CHANGE UP (ref 6.6–8.7)
RBC # BLD: 4.65 M/UL — SIGNIFICANT CHANGE UP (ref 4.6–6.2)
RBC # FLD: 13.5 % — SIGNIFICANT CHANGE UP (ref 11–15.6)
SODIUM SERPL-SCNC: 129 MMOL/L — LOW (ref 135–145)
SODIUM SERPL-SCNC: 130 MMOL/L — LOW (ref 135–145)
SODIUM SERPL-SCNC: 131 MMOL/L — LOW (ref 135–145)
SODIUM UR-SCNC: 48 MMOL/L — SIGNIFICANT CHANGE UP
WBC # BLD: 7 K/UL — SIGNIFICANT CHANGE UP (ref 4.8–10.8)
WBC # FLD AUTO: 7 K/UL — SIGNIFICANT CHANGE UP (ref 4.8–10.8)

## 2018-04-13 PROCEDURE — 99221 1ST HOSP IP/OBS SF/LOW 40: CPT

## 2018-04-13 PROCEDURE — 99233 SBSQ HOSP IP/OBS HIGH 50: CPT

## 2018-04-13 RX ORDER — HALOPERIDOL DECANOATE 100 MG/ML
10 INJECTION INTRAMUSCULAR EVERY 12 HOURS
Qty: 0 | Refills: 0 | Status: DISCONTINUED | OUTPATIENT
Start: 2018-04-13 | End: 2018-04-16

## 2018-04-13 RX ORDER — HALOPERIDOL DECANOATE 100 MG/ML
5 INJECTION INTRAMUSCULAR EVERY 8 HOURS
Qty: 0 | Refills: 0 | Status: DISCONTINUED | OUTPATIENT
Start: 2018-04-13 | End: 2018-04-16

## 2018-04-13 RX ORDER — HALOPERIDOL DECANOATE 100 MG/ML
5 INJECTION INTRAMUSCULAR ONCE
Qty: 0 | Refills: 0 | Status: COMPLETED | OUTPATIENT
Start: 2018-04-13 | End: 2018-04-13

## 2018-04-13 RX ADMIN — DIVALPROEX SODIUM 500 MILLIGRAM(S): 500 TABLET, DELAYED RELEASE ORAL at 21:12

## 2018-04-13 RX ADMIN — DIVALPROEX SODIUM 500 MILLIGRAM(S): 500 TABLET, DELAYED RELEASE ORAL at 05:36

## 2018-04-13 RX ADMIN — ENOXAPARIN SODIUM 40 MILLIGRAM(S): 100 INJECTION SUBCUTANEOUS at 05:35

## 2018-04-13 RX ADMIN — SODIUM CHLORIDE 1 GRAM(S): 9 INJECTION INTRAMUSCULAR; INTRAVENOUS; SUBCUTANEOUS at 11:11

## 2018-04-13 RX ADMIN — Medication 2 MILLIGRAM(S): at 10:49

## 2018-04-13 RX ADMIN — HALOPERIDOL DECANOATE 10 MILLIGRAM(S): 100 INJECTION INTRAMUSCULAR at 18:33

## 2018-04-13 RX ADMIN — OLANZAPINE 10 MILLIGRAM(S): 15 TABLET, FILM COATED ORAL at 05:36

## 2018-04-13 RX ADMIN — DIVALPROEX SODIUM 500 MILLIGRAM(S): 500 TABLET, DELAYED RELEASE ORAL at 14:45

## 2018-04-13 RX ADMIN — HALOPERIDOL DECANOATE 5 MILLIGRAM(S): 100 INJECTION INTRAMUSCULAR at 10:29

## 2018-04-13 RX ADMIN — Medication 2 MILLIGRAM(S): at 10:29

## 2018-04-13 RX ADMIN — SODIUM CHLORIDE 1 GRAM(S): 9 INJECTION INTRAMUSCULAR; INTRAVENOUS; SUBCUTANEOUS at 21:12

## 2018-04-13 RX ADMIN — SODIUM CHLORIDE 1 GRAM(S): 9 INJECTION INTRAMUSCULAR; INTRAVENOUS; SUBCUTANEOUS at 05:36

## 2018-04-13 NOTE — CHART NOTE - NSCHARTNOTEFT_GEN_A_CORE
Patient sleeping in bed,    Vitals stable    Continue management as per primary team.     Veto Johnston MD PGY3

## 2018-04-13 NOTE — BEHAVIORAL HEALTH ASSESSMENT NOTE - SUMMARY
Patient is a 62  year old, male; domiciles; ; noncaregiver; unemployed; PPH of schizophrenia; with multiple prior psychiatric hospitalizations ; no known suicide attempts; no active substance abuse or known history of complicated withdrawal; PMH of GERD ; biba from [facility] for hyponatremia and hypokalemia. Patient will be admitted to medicine.  Recommend   Continue Zyprexa 10 mg bid  Continue Depakote 500 mg tid  recommend Haldol 5 mg IM q8h prn and Ativan 2 mg IM q8h prn severe agitation.   continue constant observation.   Continue fluid restriction   Patient will return need to return to [facility] once medically cleared.

## 2018-04-13 NOTE — PROGRESS NOTE ADULT - SUBJECTIVE AND OBJECTIVE BOX
called to bedside patient in ED admitted from [facility] hyponatremia, improving; suddenly becoming agitated violent throwing objects; code grey called; called to assist for patient and staff safety; patient with recently normal sodium, violent agitated, yelling, present acute danger to self and staff; will administer haldol and ativan for sedation and safety; rapid response called.

## 2018-04-13 NOTE — BEHAVIORAL HEALTH ASSESSMENT NOTE - NSBHVIOLRISKFACTORS_PSY_A_CORE
Affective dysregulation/Impulsivity/Feeling of being under threat and being unable to control threat/Irritability

## 2018-04-13 NOTE — BEHAVIORAL HEALTH ASSESSMENT NOTE - RISK ASSESSMENT
High-Pt is paranoid, with poor insight, poor judgment, depressed mood, global insomnia, labile mood,  making suicidal statements, non compliant with medication.

## 2018-04-13 NOTE — PROGRESS NOTE ADULT - SUBJECTIVE AND OBJECTIVE BOX
CANDICE LESTER Male 63y [id number]    Patient is a 63y old  Male who presents with a chief complaint of abnormal labs (2018 18:07)      On service note:  Pt seen and examined at bedside, this morning patient became very agitated with HI /SI and rapid response was called- Haldol /ativan given, kept on 1:1 observation and psych consult requested- please refer to rapid response note. Pt sleeping at this time.     Review of system:  Unable to obtain.       PHYSICAL EXAM:    Vital Signs Last 24 Hrs  T(C): 37.1 (2018 12:31), Max: 37.1 (2018 12:31)  T(F): 98.7 (2018 12:31), Max: 98.7 (2018 12:31)  HR: 68 (2018 12:31) (56 - 69)  BP: 126/58 (2018 12:31) (126/58 - 172/86)  BP(mean): --  RR: 18 (2018 12:31) (16 - 20)  SpO2: 98% (2018 12:31) (97% - 100%)    GENERAL: Pt lying comfortably, NAD, sleeping in bed.  CHEST/LUNG: Clear to auscultation bilaterally; No wheezing.  HEART: S1S2+, Regular rate and rhythm; No murmurs.  ABDOMEN: Soft, Nontender, Nondistended; Bowel sounds present.  Extremities: No LE edema, pulses +  NEURO: AAOX3, no focal deficits, no motor r sensory loss.      MEDICATIONS  (STANDING):  diVALproex  milliGRAM(s) Oral three times a day  enoxaparin Injectable 40 milliGRAM(s) SubCutaneous every 24 hours  OLANZapine Disintegrating Tablet 10 milliGRAM(s) Oral two times a day  pantoprazole    Tablet 40 milliGRAM(s) Oral before breakfast  sodium chloride 1 Gram(s) Oral three times a day    MEDICATIONS  (PRN):  LORazepam   Injectable 2 milliGRAM(s) IntraMuscular every 6 hours PRN Agitation  OLANZapine Disintegrating Tablet 5 milliGRAM(s) Oral every 6 hours PRN agitation        Labs:  LABS:                        13.2   7.0   )-----------( 334      ( 2018 06:58 )             38.4     04-13    130<L>  |  92<L>  |  12.0  ----------------------------<  98  4.5   |  28.0  |  0.66    Ca    8.7      2018 06:58  Phos  3.8     04-12  Mg     1.8     -12    TPro  7.0  /  Alb  3.9  /  TBili  0.3<L>  /  DBili  x   /  AST  16  /  ALT  11  /  AlkPhos  55  04-13        LIVER FUNCTIONS - ( 2018 06:58 )  Alb: 3.9 g/dL / Pro: 7.0 g/dL / ALK PHOS: 55 U/L / ALT: 11 U/L / AST: 16 U/L / GGT: x           Urinalysis Basic - ( 2018 16:59 )    Color: Yellow / Appearance: Clear / S.010 / pH: x  Gluc: x / Ketone: Negative  / Bili: Negative / Urobili: Negative mg/dL   Blood: x / Protein: Negative mg/dL / Nitrite: Negative   Leuk Esterase: Negative / RBC: x / WBC x   Sq Epi: x / Non Sq Epi: x / Bacteria: x

## 2018-04-13 NOTE — BEHAVIORAL HEALTH ASSESSMENT NOTE - NSBHCHARTREVIEWLAB_PSY_A_CORE FT
13.2   7.0   )-----------( 334      ( 2018 06:58 )             38.4     04-13    130<L>  |  92<L>  |  12.0  ----------------------------<  98  4.5   |  28.0  |  0.66    Ca    8.7      2018 06:58  Phos  3.8     04-12  Mg     1.8     04-12    TPro  7.0  /  Alb  3.9  /  TBili  0.3<L>  /  DBili  x   /  AST  16  /  ALT  11  /  AlkPhos  55  04-13    LIVER FUNCTIONS - ( 2018 06:58 )  Alb: 3.9 g/dL / Pro: 7.0 g/dL / ALK PHOS: 55 U/L / ALT: 11 U/L / AST: 16 U/L / GGT: x             Urinalysis Basic - ( 2018 16:59 )    Color: Yellow / Appearance: Clear / S.010 / pH: x  Gluc: x / Ketone: Negative  / Bili: Negative / Urobili: Negative mg/dL   Blood: x / Protein: Negative mg/dL / Nitrite: Negative   Leuk Esterase: Negative / RBC: x / WBC x   Sq Epi: x / Non Sq Epi: x / Bacteria: x

## 2018-04-13 NOTE — PROGRESS NOTE ADULT - SUBJECTIVE AND OBJECTIVE BOX
NEPHROLOGY INTERVAL HPI/OVERNIGHT EVENTS:  \  Off IVF   Events noted   Meds reviewed     MEDICATIONS  (STANDING):  diVALproex  milliGRAM(s) Oral three times a day  enoxaparin Injectable 40 milliGRAM(s) SubCutaneous every 24 hours  OLANZapine Disintegrating Tablet 10 milliGRAM(s) Oral two times a day  pantoprazole    Tablet 40 milliGRAM(s) Oral before breakfast  sodium chloride 1 Gram(s) Oral three times a day    MEDICATIONS  (PRN):  haloperidol    Injectable 5 milliGRAM(s) IntraMuscular every 8 hours PRN agitation  LORazepam   Injectable 2 milliGRAM(s) IntraMuscular every 8 hours PRN Agitation  OLANZapine Disintegrating Tablet 5 milliGRAM(s) Oral every 6 hours PRN agitation      Allergies    No Known Allergies    Intolerances          Vital Signs Last 24 Hrs  T(C): 37.1 (2018 12:31), Max: 37.1 (2018 12:31)  T(F): 98.7 (2018 12:31), Max: 98.7 (2018 12:31)  HR: 68 (2018 12:31) (56 - 68)  BP: 126/58 (2018 12:31) (126/58 - 172/86)  BP(mean): --  RR: 18 (2018 12:) (16 - 20)  SpO2: 98% (2018 12:31) (97% - 100%)  Daily     Daily   I&O's Detail    2018 07:  -  2018 07:00  --------------------------------------------------------  IN:    Oral Fluid: 474 mL  Total IN: 474 mL    OUT:    Voided: 1600 mL  Total OUT: 1600 mL    Total NET: -1126 mL        I&O's Summary    2018 07:  -  2018 07:00  --------------------------------------------------------  IN: 474 mL / OUT: 1600 mL / NET: -1126 mL        PHYSICAL EXAM:  HEENT - No edema   NECK: Supple, No JVD,   CHEST/LUNG: Clear  HEART: Regular rate and rhythm; No rub  ABDOMEN: Soft, Nontender,   EXTREMITIES:  no edema        LABS:                        13.2   7.0   )-----------( 334      ( 2018 06:58 )             38.4     04-13    130<L>  |  92<L>  |  12.0  ----------------------------<  98  4.5   |  28.0  |  0.66    Ca    8.7      2018 06:58  Phos  3.8     04-12  Mg     1.8     04-12    TPro  7.0  /  Alb  3.9  /  TBili  0.3<L>  /  DBili  x   /  AST  16  /  ALT  11  /  AlkPhos  55  04-13      Urinalysis Basic - ( 2018 16:59 )    Color: Yellow / Appearance: Clear / S.010 / pH: x  Gluc: x / Ketone: Negative  / Bili: Negative / Urobili: Negative mg/dL   Blood: x / Protein: Negative mg/dL / Nitrite: Negative   Leuk Esterase: Negative / RBC: x / WBC x   Sq Epi: x / Non Sq Epi: x / Bacteria: x              RADIOLOGY & ADDITIONAL TESTS:

## 2018-04-13 NOTE — PROGRESS NOTE ADULT - SUBJECTIVE AND OBJECTIVE BOX
Called back and spoke to Dr Fine who reports switching patient from Zyprexa to Haldol 2/2 patients recent complaints of weight gain. Patient was receiving Haldol 10 mg bid and Depakote 500 mg tid. Haldol Dec was  increased from 100mg to 200mg last received 4/11/ 18. Called [facility] x 2 requesting med req.   Recommend   discontinuing standing and prn Zyprexa.  Start Haldol 10mg q12h.   Continue Haldol 5mg IM/po prn agitation  Continue Ativan 2 mg IM/po prn agitation.   Continue Depakote 500 mg tid.

## 2018-04-13 NOTE — CHART NOTE - NSCHARTNOTEFT_GEN_A_CORE
Rapid Response PGY 3 Note  Patient is a 64 y/o male PMH of Bipolar with manic episodes comes from [facility] admitted for hyponatremia.    Rapid response called for acute agitation, patient had ripped IV line from his arm and was yelling at nurses and was trying to get out of his bed. Several Security officers were at bedside to assist.     Allergies    No Known Allergies    Intolerances        PAST MEDICAL & SURGICAL HISTORY:  Bipolar 1 disorder  Bipolar 1 disorder  Other schizophrenia  No significant past surgical history      Vital Signs  Unable to Obtain vital signs as patient would not allow anyone to approach him     Unable to complete physical exam as patient would not allow anyone to approach him   GENERAL: The patient is awake.  PSYCH: Agitated, yelling at nurses and doctors in the room, patient had ripped all IV lines and monitors from his body.      @ 07:01  -   @ 07:00  --------------------------------------------------------  IN: 474 mL / OUT: 1600 mL / NET: -1126 mL                           13.2   7.0   )-----------( 334      ( 2018 06:58 )             38.4     04-13    130<L>  |  92<L>  |  12.0  ----------------------------<  98  4.5   |  28.0  |  0.66    Ca    8.7      2018 06:58  Phos  3.8     04-12  Mg     1.8     -12    TPro  7.0  /  Alb  3.9  /  TBili  0.3<L>  /  DBili  x   /  AST  16  /  ALT  11  /  AlkPhos  55  04-13         LIVER FUNCTIONS - ( 2018 06:58 )  Alb: 3.9 g/dL / Pro: 7.0 g/dL / ALK PHOS: 55 U/L / ALT: 11 U/L / AST: 16 U/L / GGT: x         Urinalysis Basic - ( 2018 16:59 )    Color: Yellow / Appearance: Clear / S.010 / pH: x  Gluc: x / Ketone: Negative  / Bili: Negative / Urobili: Negative mg/dL   Blood: x / Protein: Negative mg/dL / Nitrite: Negative   Leuk Esterase: Negative / RBC: x / WBC x   Sq Epi: x / Non Sq Epi: x / Bacteria: x       Assessment-   Patient is a 64 y/o male PMH of Bipolar with manic episodes comes from [facility] admitted for hyponatremia.  Rapid response called for acute agitation, patient had ripped IV line from his arm and was yelling at nurses and was trying to get out of his bed. Several Security officers were at bedside to assist.     Plan-  Ativan 2mg and Haldol 5mg given IM prior to our arrival  After assessing patient for 5-10 mins, he was still agitated so patient was given additional 2mg Ativan IM.    1:1 Observation was ordered    Psych consult called to see the patient    Discussed case with Dr. Pacheco who agrees with plan    Debriefed with nursing staff after rapid.     Veto Johnston MD PGY3

## 2018-04-13 NOTE — BEHAVIORAL HEALTH ASSESSMENT NOTE - NSBHCHARTREVIEWVS_PSY_A_CORE FT
Vital Signs Last 24 Hrs  T(C): 36.4 (13 Apr 2018 07:24), Max: 37.1 (12 Apr 2018 12:57)  T(F): 97.6 (13 Apr 2018 07:24), Max: 98.7 (12 Apr 2018 12:57)  HR: 58 (13 Apr 2018 07:24) (56 - 76)  BP: 132/58 (13 Apr 2018 07:24) (132/58 - 185/82)  BP(mean): --  RR: 18 (13 Apr 2018 07:24) (16 - 20)  SpO2: 97% (13 Apr 2018 07:24) (97% - 100%)

## 2018-04-13 NOTE — BEHAVIORAL HEALTH ASSESSMENT NOTE - HPI (INCLUDE ILLNESS QUALITY, SEVERITY, DURATION, TIMING, CONTEXT, MODIFYING FACTORS, ASSOCIATED SIGNS AND SYMPTOMS)
Patient is a 62 year old, male; domiciled alone,  ; noncaregiver; unemployed; PPH of Bipolar; with multiple prior psychiatric hospitalizations ; no known suicide attempts; no active substance abuse or known history of complicated withdrawal; PMH of GERD ; brought from [facility] for hyponatremia and hyperkalemia. Patient has chronic hyponatremia with polydipsia and was sent from [facility] due to hyponatremia 117 and hyperkalemia.   He was recently hospitalized with similar problem discharged on salt tablet on 04/04/2018. Pt has h/o polydipsia, cont to drink plenty of water at [facility], per patient  medication makes him thirsty that's why he drinking plenty of water.       Previous records report patient formerly lived with wife but has been living on his own for the last 6 months.  He does not know why he is here and does not feel he has any psychiatric problems. He states that he is not crazy and does not want to take medication because it makes him thirst. Patient also states he does not want to go back to [facility] because the other patients do not like him because he sings and plays the piano. While in ED patient became agitated male patient was placed in room. Patient spit on his male nurse required Ativan 2 mg x2 and Haldol 5 mg im. Patient reports he does not like another male in his room.    Patient has h/o noncompliance with medication and has ACT team. He has documented diagnoses of bipolar disorder with psychotic features.  Patient denies current or recent A/V/H or suicidal ideation, intent or plan.   Received collateral from inpatient provider at [facility], DR Fine [phone] who reports patient has been on a fluid restriction at [facility]. Dr Fine confirmed patient receives Haldol Dec and Zyprexa 10 mg bid and Depakote 1500 mg daily. Patient has been compliant with medications. Patient was previously on a sanjay unit however do to agitation and violence he was not appropriate for this unit.   Attempted to contact daughter and Janeewhitney Dumont  for collateral.

## 2018-04-14 LAB
ANION GAP SERPL CALC-SCNC: 11 MMOL/L — SIGNIFICANT CHANGE UP (ref 5–17)
BUN SERPL-MCNC: 18 MG/DL — SIGNIFICANT CHANGE UP (ref 8–20)
CALCIUM SERPL-MCNC: 9.2 MG/DL — SIGNIFICANT CHANGE UP (ref 8.6–10.2)
CHLORIDE SERPL-SCNC: 96 MMOL/L — LOW (ref 98–107)
CO2 SERPL-SCNC: 26 MMOL/L — SIGNIFICANT CHANGE UP (ref 22–29)
CREAT SERPL-MCNC: 0.88 MG/DL — SIGNIFICANT CHANGE UP (ref 0.5–1.3)
GLUCOSE SERPL-MCNC: 88 MG/DL — SIGNIFICANT CHANGE UP (ref 70–115)
HCT VFR BLD CALC: 40.9 % — LOW (ref 42–52)
HGB BLD-MCNC: 13.6 G/DL — LOW (ref 14–18)
MCHC RBC-ENTMCNC: 27.8 PG — SIGNIFICANT CHANGE UP (ref 27–31)
MCHC RBC-ENTMCNC: 33.3 G/DL — SIGNIFICANT CHANGE UP (ref 32–36)
MCV RBC AUTO: 83.6 FL — SIGNIFICANT CHANGE UP (ref 80–94)
PLATELET # BLD AUTO: 324 K/UL — SIGNIFICANT CHANGE UP (ref 150–400)
POTASSIUM SERPL-MCNC: 4.9 MMOL/L — SIGNIFICANT CHANGE UP (ref 3.5–5.3)
POTASSIUM SERPL-SCNC: 4.9 MMOL/L — SIGNIFICANT CHANGE UP (ref 3.5–5.3)
RBC # BLD: 4.89 M/UL — SIGNIFICANT CHANGE UP (ref 4.6–6.2)
RBC # FLD: 13.7 % — SIGNIFICANT CHANGE UP (ref 11–15.6)
SODIUM SERPL-SCNC: 133 MMOL/L — LOW (ref 135–145)
WBC # BLD: 7.3 K/UL — SIGNIFICANT CHANGE UP (ref 4.8–10.8)
WBC # FLD AUTO: 7.3 K/UL — SIGNIFICANT CHANGE UP (ref 4.8–10.8)

## 2018-04-14 PROCEDURE — 99232 SBSQ HOSP IP/OBS MODERATE 35: CPT

## 2018-04-14 RX ADMIN — SODIUM CHLORIDE 1 GRAM(S): 9 INJECTION INTRAMUSCULAR; INTRAVENOUS; SUBCUTANEOUS at 22:35

## 2018-04-14 RX ADMIN — PANTOPRAZOLE SODIUM 40 MILLIGRAM(S): 20 TABLET, DELAYED RELEASE ORAL at 06:20

## 2018-04-14 RX ADMIN — HALOPERIDOL DECANOATE 5 MILLIGRAM(S): 100 INJECTION INTRAMUSCULAR at 16:04

## 2018-04-14 RX ADMIN — Medication 2 MILLIGRAM(S): at 16:14

## 2018-04-14 RX ADMIN — DIVALPROEX SODIUM 500 MILLIGRAM(S): 500 TABLET, DELAYED RELEASE ORAL at 16:01

## 2018-04-14 RX ADMIN — DIVALPROEX SODIUM 500 MILLIGRAM(S): 500 TABLET, DELAYED RELEASE ORAL at 22:34

## 2018-04-14 RX ADMIN — DIVALPROEX SODIUM 500 MILLIGRAM(S): 500 TABLET, DELAYED RELEASE ORAL at 06:20

## 2018-04-14 RX ADMIN — SODIUM CHLORIDE 1 GRAM(S): 9 INJECTION INTRAMUSCULAR; INTRAVENOUS; SUBCUTANEOUS at 06:20

## 2018-04-14 RX ADMIN — SODIUM CHLORIDE 1 GRAM(S): 9 INJECTION INTRAMUSCULAR; INTRAVENOUS; SUBCUTANEOUS at 16:00

## 2018-04-14 RX ADMIN — HALOPERIDOL DECANOATE 10 MILLIGRAM(S): 100 INJECTION INTRAMUSCULAR at 17:58

## 2018-04-14 RX ADMIN — ENOXAPARIN SODIUM 40 MILLIGRAM(S): 100 INJECTION SUBCUTANEOUS at 08:56

## 2018-04-14 RX ADMIN — HALOPERIDOL DECANOATE 10 MILLIGRAM(S): 100 INJECTION INTRAMUSCULAR at 08:56

## 2018-04-14 NOTE — PROGRESS NOTE ADULT - SUBJECTIVE AND OBJECTIVE BOX
CANDICE LESTER Male 63y [id number]    Patient is a 63y old  Male who presents with a chief complaint of abnormal labs (2018 18:07)      Subjective/objective:  Pt seen and examined at bedside, no over night event reported by night staff. Pt is more calm today, denied any complaints. Hyponatremia improving.     Review of system:  Limited but no fever, chills, nausea, vomiting, headache, dizziness, chest pain, SOB or palpitation.      PHYSICAL EXAM:    Vital Signs Last 24 Hrs  T(C): 36.5 (2018 20:19), Max: 37.1 (2018 12:31)  T(F): 97.7 (2018 20:19), Max: 98.7 (2018 12:31)  HR: 59 (2018 06:00) (59 - 68)  BP: 134/96 (2018 06:00) (100/52 - 134/96)  BP(mean): --  RR: 18 (2018 20:19) (18 - 18)  SpO2: 96% (2018 20:19) (96% - 98%)    GENERAL: Pt lying comfortably, NAD.  CHEST/LUNG: Clear to auscultation bilaterally; No wheezing.  HEART: S1S2+, Regular rate and rhythm; No murmurs.  ABDOMEN: Soft, Nontender, Nondistended; Bowel sounds present.  Extremities: No LE edema, pulses +  NEURO: AAOX3, no focal deficits, no motor r sensory loss.    MEDICATIONS  (STANDING):  diVALproex  milliGRAM(s) Oral three times a day  enoxaparin Injectable 40 milliGRAM(s) SubCutaneous every 24 hours  haloperidol     Tablet 10 milliGRAM(s) Oral every 12 hours  pantoprazole    Tablet 40 milliGRAM(s) Oral before breakfast  sodium chloride 1 Gram(s) Oral three times a day    MEDICATIONS  (PRN):  haloperidol    Injectable 5 milliGRAM(s) IntraMuscular every 8 hours PRN agitation  LORazepam   Injectable 2 milliGRAM(s) IntraMuscular every 8 hours PRN Agitation        Labs:  LABS:                        13.6   7.3   )-----------( 324      ( 2018 07:21 )             40.9     04-14    133<L>  |  96<L>  |  18.0  ----------------------------<  88  4.9   |  26.0  |  0.88    Ca    9.2      2018 07:21  Phos  3.8     04-12  Mg     2.2     04-13    TPro  7.0  /  Alb  3.9  /  TBili  0.3<L>  /  DBili  x   /  AST  16  /  ALT  11  /  AlkPhos  55  04-13        LIVER FUNCTIONS - ( 2018 06:58 )  Alb: 3.9 g/dL / Pro: 7.0 g/dL / ALK PHOS: 55 U/L / ALT: 11 U/L / AST: 16 U/L / GGT: x           Urinalysis Basic - ( 2018 16:59 )    Color: Yellow / Appearance: Clear / S.010 / pH: x  Gluc: x / Ketone: Negative  / Bili: Negative / Urobili: Negative mg/dL   Blood: x / Protein: Negative mg/dL / Nitrite: Negative   Leuk Esterase: Negative / RBC: x / WBC x   Sq Epi: x / Non Sq Epi: x / Bacteria: x CANDICE LESTER Male 63y [id number]    Patient is a 63y old  Male who presents with a chief complaint of abnormal labs (2018 18:07)      Subjective/objective:  Pt seen and examined at bedside, no over night event reported by night staff. Pt is more calm today, denied any complaints. Hyponatremia improving.     Review of system:  Unable to obtain.       PHYSICAL EXAM:    Vital Signs Last 24 Hrs  T(C): 36.5 (2018 20:19), Max: 37.1 (2018 12:31)  T(F): 97.7 (2018 20:19), Max: 98.7 (2018 12:31)  HR: 59 (2018 06:00) (59 - 68)  BP: 134/96 (2018 06:00) (100/52 - 134/96)  BP(mean): --  RR: 18 (2018 20:19) (18 - 18)  SpO2: 96% (2018 20:19) (96% - 98%)    GENERAL: Pt lying comfortably, NAD.  CHEST/LUNG: Clear to auscultation bilaterally; No wheezing.  HEART: S1S2+, Regular rate and rhythm; No murmurs.  ABDOMEN: Soft, Nontender, Nondistended; Bowel sounds present.  Extremities: No LE edema, pulses +  NEURO: AAOX3, no focal deficits, no motor r sensory loss.    MEDICATIONS  (STANDING):  diVALproex  milliGRAM(s) Oral three times a day  enoxaparin Injectable 40 milliGRAM(s) SubCutaneous every 24 hours  haloperidol     Tablet 10 milliGRAM(s) Oral every 12 hours  pantoprazole    Tablet 40 milliGRAM(s) Oral before breakfast  sodium chloride 1 Gram(s) Oral three times a day    MEDICATIONS  (PRN):  haloperidol    Injectable 5 milliGRAM(s) IntraMuscular every 8 hours PRN agitation  LORazepam   Injectable 2 milliGRAM(s) IntraMuscular every 8 hours PRN Agitation        Labs:  LABS:                        13.6   7.3   )-----------( 324      ( 2018 07:21 )             40.9     04-14    133<L>  |  96<L>  |  18.0  ----------------------------<  88  4.9   |  26.0  |  0.88    Ca    9.2      2018 07:21  Phos  3.8     04-12  Mg     2.2     04-13    TPro  7.0  /  Alb  3.9  /  TBili  0.3<L>  /  DBili  x   /  AST  16  /  ALT  11  /  AlkPhos  55  04-13        LIVER FUNCTIONS - ( 2018 06:58 )  Alb: 3.9 g/dL / Pro: 7.0 g/dL / ALK PHOS: 55 U/L / ALT: 11 U/L / AST: 16 U/L / GGT: x           Urinalysis Basic - ( 2018 16:59 )    Color: Yellow / Appearance: Clear / S.010 / pH: x  Gluc: x / Ketone: Negative  / Bili: Negative / Urobili: Negative mg/dL   Blood: x / Protein: Negative mg/dL / Nitrite: Negative   Leuk Esterase: Negative / RBC: x / WBC x   Sq Epi: x / Non Sq Epi: x / Bacteria: x

## 2018-04-14 NOTE — PROGRESS NOTE ADULT - SUBJECTIVE AND OBJECTIVE BOX
NEPHROLOGY INTERVAL HPI/OVERNIGHT EVENTS:    No new events   Meds and fluid restrict noted     MEDICATIONS  (STANDING):  diVALproex  milliGRAM(s) Oral three times a day  enoxaparin Injectable 40 milliGRAM(s) SubCutaneous every 24 hours  haloperidol     Tablet 10 milliGRAM(s) Oral every 12 hours  pantoprazole    Tablet 40 milliGRAM(s) Oral before breakfast  sodium chloride 1 Gram(s) Oral three times a day    MEDICATIONS  (PRN):  haloperidol    Injectable 5 milliGRAM(s) IntraMuscular every 8 hours PRN agitation  LORazepam   Injectable 2 milliGRAM(s) IntraMuscular every 8 hours PRN Agitation      Allergies    No Known Allergies    Intolerances          Vital Signs Last 24 Hrs  T(C): 36.5 (2018 20:19), Max: 37.1 (2018 12:31)  T(F): 97.7 (2018 20:19), Max: 98.7 (2018 12:31)  HR: 59 (2018 06:00) (59 - 68)  BP: 134/96 (2018 06:00) (100/52 - 134/96)  BP(mean): --  RR: 18 (2018 20:19) (18 - 18)  SpO2: 96% (2018 20:19) (96% - 98%)  Daily     Daily   I&O's Detail    2018 07:  -  2018 10:51  --------------------------------------------------------  IN:  Total IN: 0 mL    OUT:    Voided: 400 mL  Total OUT: 400 mL    Total NET: -400 mL        I&O's Summary    2018 07:  -  2018 10:51  --------------------------------------------------------  IN: 0 mL / OUT: 400 mL / NET: -400 mL        PHYSICAL EXAM:  HEENT - No edema   NECK: Supple, No JVD,   CHEST/LUNG: Clear  HEART: Regular rate and rhythm; No rub  ABDOMEN: Soft, Nontender,   EXTREMITIES:  no edema      LABS:                        13.6   7.3   )-----------( 324      ( 2018 07:21 )             40.9     04-14    133<L>  |  96<L>  |  18.0  ----------------------------<  88  4.9   |  26.0  |  0.88    Ca    9.2      2018 07:21  Phos  3.8     04-12  Mg     2.2     -13    TPro  7.0  /  Alb  3.9  /  TBili  0.3<L>  /  DBili  x   /  AST  16  /  ALT  11  /  AlkPhos  55  -13      Urinalysis Basic - ( 2018 16:59 )    Color: Yellow / Appearance: Clear / S.010 / pH: x  Gluc: x / Ketone: Negative  / Bili: Negative / Urobili: Negative mg/dL   Blood: x / Protein: Negative mg/dL / Nitrite: Negative   Leuk Esterase: Negative / RBC: x / WBC x   Sq Epi: x / Non Sq Epi: x / Bacteria: x      Magnesium, Serum: 2.2 mg/dL ( @ 16:02)          RADIOLOGY & ADDITIONAL TESTS:

## 2018-04-15 LAB
ANION GAP SERPL CALC-SCNC: 8 MMOL/L — SIGNIFICANT CHANGE UP (ref 5–17)
BUN SERPL-MCNC: 14 MG/DL — SIGNIFICANT CHANGE UP (ref 8–20)
CALCIUM SERPL-MCNC: 9.2 MG/DL — SIGNIFICANT CHANGE UP (ref 8.6–10.2)
CHLORIDE SERPL-SCNC: 98 MMOL/L — SIGNIFICANT CHANGE UP (ref 98–107)
CO2 SERPL-SCNC: 30 MMOL/L — HIGH (ref 22–29)
CREAT SERPL-MCNC: 0.89 MG/DL — SIGNIFICANT CHANGE UP (ref 0.5–1.3)
GLUCOSE SERPL-MCNC: 93 MG/DL — SIGNIFICANT CHANGE UP (ref 70–115)
POTASSIUM SERPL-MCNC: 5.1 MMOL/L — SIGNIFICANT CHANGE UP (ref 3.5–5.3)
POTASSIUM SERPL-SCNC: 5.1 MMOL/L — SIGNIFICANT CHANGE UP (ref 3.5–5.3)
SODIUM SERPL-SCNC: 136 MMOL/L — SIGNIFICANT CHANGE UP (ref 135–145)

## 2018-04-15 PROCEDURE — 99232 SBSQ HOSP IP/OBS MODERATE 35: CPT

## 2018-04-15 RX ADMIN — HALOPERIDOL DECANOATE 10 MILLIGRAM(S): 100 INJECTION INTRAMUSCULAR at 17:16

## 2018-04-15 RX ADMIN — ENOXAPARIN SODIUM 40 MILLIGRAM(S): 100 INJECTION SUBCUTANEOUS at 06:46

## 2018-04-15 RX ADMIN — Medication 2 MILLIGRAM(S): at 12:16

## 2018-04-15 RX ADMIN — SODIUM CHLORIDE 1 GRAM(S): 9 INJECTION INTRAMUSCULAR; INTRAVENOUS; SUBCUTANEOUS at 21:38

## 2018-04-15 RX ADMIN — PANTOPRAZOLE SODIUM 40 MILLIGRAM(S): 20 TABLET, DELAYED RELEASE ORAL at 06:46

## 2018-04-15 RX ADMIN — HALOPERIDOL DECANOATE 10 MILLIGRAM(S): 100 INJECTION INTRAMUSCULAR at 06:47

## 2018-04-15 RX ADMIN — DIVALPROEX SODIUM 500 MILLIGRAM(S): 500 TABLET, DELAYED RELEASE ORAL at 06:46

## 2018-04-15 RX ADMIN — DIVALPROEX SODIUM 500 MILLIGRAM(S): 500 TABLET, DELAYED RELEASE ORAL at 16:11

## 2018-04-15 RX ADMIN — SODIUM CHLORIDE 1 GRAM(S): 9 INJECTION INTRAMUSCULAR; INTRAVENOUS; SUBCUTANEOUS at 16:10

## 2018-04-15 RX ADMIN — SODIUM CHLORIDE 1 GRAM(S): 9 INJECTION INTRAMUSCULAR; INTRAVENOUS; SUBCUTANEOUS at 06:46

## 2018-04-15 RX ADMIN — DIVALPROEX SODIUM 500 MILLIGRAM(S): 500 TABLET, DELAYED RELEASE ORAL at 21:39

## 2018-04-15 NOTE — PROGRESS NOTE ADULT - SUBJECTIVE AND OBJECTIVE BOX
CANDICE LESTER Male 63y [id number]    Patient is a 63y old  Male who presents with a chief complaint of abnormal labs (12 Apr 2018 18:07)    Subjective/objective:  Pt seen and examined at bedside, no over night event reported by night staff. Pt is more calm today, denied any complaints. Hyponatremia resolved.     Review of system:  Unable to obtain.       PHYSICAL EXAM:    Vital Signs Last 24 Hrs  T(C): 36.7 (15 Apr 2018 06:00), Max: 36.8 (14 Apr 2018 11:02)  T(F): 98.1 (15 Apr 2018 06:00), Max: 98.3 (14 Apr 2018 11:02)  HR: 65 (15 Apr 2018 06:00) (65 - 84)  BP: 142/81 (15 Apr 2018 06:00) (129/74 - 142/81)  BP(mean): --  RR: 18 (15 Apr 2018 06:00) (17 - 18)  SpO2: 98% (14 Apr 2018 21:44) (97% - 98%)    GENERAL: Pt lying comfortably, NAD.  CHEST/LUNG: Clear to auscultation bilaterally; No wheezing.  HEART: S1S2+, Regular rate and rhythm; No murmurs.  ABDOMEN: Soft, Nontender, Nondistended; Bowel sounds present.  Extremities: No LE edema, pulses +  NEURO: Awake/alert, no focal deficits, no motor r sensory loss.      MEDICATIONS  (STANDING):  diVALproex  milliGRAM(s) Oral three times a day  enoxaparin Injectable 40 milliGRAM(s) SubCutaneous every 24 hours  haloperidol     Tablet 10 milliGRAM(s) Oral every 12 hours  pantoprazole    Tablet 40 milliGRAM(s) Oral before breakfast  sodium chloride 1 Gram(s) Oral three times a day    MEDICATIONS  (PRN):  haloperidol    Injectable 5 milliGRAM(s) IntraMuscular every 8 hours PRN agitation  LORazepam   Injectable 2 milliGRAM(s) IntraMuscular every 8 hours PRN Agitation        Labs:  LABS:                        13.6   7.3   )-----------( 324      ( 14 Apr 2018 07:21 )             40.9     04-15    136  |  98  |  14.0  ----------------------------<  93  5.1   |  30.0<H>  |  0.89    Ca    9.2      15 Apr 2018 07:27  Mg     2.2     04-13

## 2018-04-15 NOTE — PROGRESS NOTE ADULT - SUBJECTIVE AND OBJECTIVE BOX
NEPHROLOGY INTERVAL HPI/OVERNIGHT EVENTS:    No new events   Meds reviewed       MEDICATIONS  (STANDING):  diVALproex  milliGRAM(s) Oral three times a day  enoxaparin Injectable 40 milliGRAM(s) SubCutaneous every 24 hours  haloperidol     Tablet 10 milliGRAM(s) Oral every 12 hours  pantoprazole    Tablet 40 milliGRAM(s) Oral before breakfast  sodium chloride 1 Gram(s) Oral three times a day    MEDICATIONS  (PRN):  haloperidol    Injectable 5 milliGRAM(s) IntraMuscular every 8 hours PRN agitation  LORazepam   Injectable 2 milliGRAM(s) IntraMuscular every 8 hours PRN Agitation      Allergies    No Known Allergies    Intolerances        Vital Signs Last 24 Hrs  T(C): 37.2 (15 Apr 2018 16:14), Max: 37.2 (15 Apr 2018 16:14)  T(F): 98.9 (15 Apr 2018 16:14), Max: 98.9 (15 Apr 2018 16:14)  HR: 79 (15 Apr 2018 16:14) (65 - 79)  BP: 137/69 (15 Apr 2018 16:14) (134/74 - 142/81)  BP(mean): --  RR: 18 (15 Apr 2018 16:14) (17 - 18)  SpO2: 98% (15 Apr 2018 16:14) (98% - 98%)  Daily     Daily   I&O's Detail    14 Apr 2018 07:01  -  15 Apr 2018 07:00  --------------------------------------------------------  IN:    Oral Fluid: 1000 mL  Total IN: 1000 mL    OUT:    Voided: 1040 mL  Total OUT: 1040 mL    Total NET: -40 mL      15 Apr 2018 07:01  -  15 Apr 2018 20:13  --------------------------------------------------------  IN:    Oral Fluid: 773 mL  Total IN: 773 mL    OUT:    Voided: 570 mL  Total OUT: 570 mL    Total NET: 203 mL        I&O's Summary    14 Apr 2018 07:01  -  15 Apr 2018 07:00  --------------------------------------------------------  IN: 1000 mL / OUT: 1040 mL / NET: -40 mL    15 Apr 2018 07:01  -  15 Apr 2018 20:13  --------------------------------------------------------  IN: 773 mL / OUT: 570 mL / NET: 203 mL        PHYSICAL EXAM:    PHYSICAL EXAM:  HEENT - No edema   NECK: Supple, No JVD,   CHEST/LUNG: Clear  HEART: Regular rate and rhythm; No rub  ABDOMEN: Soft, Nontender,   EXTREMITIES:  no edema    LABS:                        13.6   7.3   )-----------( 324      ( 14 Apr 2018 07:21 )             40.9     04-15    136  |  98  |  14.0  ----------------------------<  93  5.1   |  30.0<H>  |  0.89    Ca    9.2      15 Apr 2018 07:27                  RADIOLOGY & ADDITIONAL TESTS:

## 2018-04-16 ENCOUNTER — TRANSCRIPTION ENCOUNTER (OUTPATIENT)
Age: 63
End: 2018-04-16

## 2018-04-16 VITALS
DIASTOLIC BLOOD PRESSURE: 72 MMHG | RESPIRATION RATE: 20 BRPM | TEMPERATURE: 97 F | OXYGEN SATURATION: 97 % | SYSTOLIC BLOOD PRESSURE: 132 MMHG | HEART RATE: 74 BPM

## 2018-04-16 PROCEDURE — 84100 ASSAY OF PHOSPHORUS: CPT

## 2018-04-16 PROCEDURE — 83735 ASSAY OF MAGNESIUM: CPT

## 2018-04-16 PROCEDURE — 99232 SBSQ HOSP IP/OBS MODERATE 35: CPT

## 2018-04-16 PROCEDURE — 84133 ASSAY OF URINE POTASSIUM: CPT

## 2018-04-16 PROCEDURE — 82533 TOTAL CORTISOL: CPT

## 2018-04-16 PROCEDURE — 81003 URINALYSIS AUTO W/O SCOPE: CPT

## 2018-04-16 PROCEDURE — 82570 ASSAY OF URINE CREATININE: CPT

## 2018-04-16 PROCEDURE — 83935 ASSAY OF URINE OSMOLALITY: CPT

## 2018-04-16 PROCEDURE — 84300 ASSAY OF URINE SODIUM: CPT

## 2018-04-16 PROCEDURE — T1013: CPT

## 2018-04-16 PROCEDURE — 99285 EMERGENCY DEPT VISIT HI MDM: CPT

## 2018-04-16 PROCEDURE — 80048 BASIC METABOLIC PNL TOTAL CA: CPT

## 2018-04-16 PROCEDURE — 99239 HOSP IP/OBS DSCHRG MGMT >30: CPT

## 2018-04-16 PROCEDURE — 85027 COMPLETE CBC AUTOMATED: CPT

## 2018-04-16 PROCEDURE — 93005 ELECTROCARDIOGRAM TRACING: CPT

## 2018-04-16 PROCEDURE — 84132 ASSAY OF SERUM POTASSIUM: CPT

## 2018-04-16 PROCEDURE — 36415 COLL VENOUS BLD VENIPUNCTURE: CPT

## 2018-04-16 PROCEDURE — 80053 COMPREHEN METABOLIC PANEL: CPT

## 2018-04-16 RX ORDER — IBUPROFEN 200 MG
1 TABLET ORAL
Qty: 0 | Refills: 0 | COMMUNITY

## 2018-04-16 RX ORDER — HALOPERIDOL DECANOATE 100 MG/ML
5 INJECTION INTRAMUSCULAR ONCE
Qty: 0 | Refills: 0 | Status: COMPLETED | OUTPATIENT
Start: 2018-04-16 | End: 2018-04-16

## 2018-04-16 RX ORDER — IBUPROFEN 200 MG
1 TABLET ORAL
Qty: 0 | Refills: 0 | DISCHARGE
Start: 2018-04-16

## 2018-04-16 RX ORDER — DIPHENHYDRAMINE HCL 50 MG
50 CAPSULE ORAL ONCE
Qty: 0 | Refills: 0 | Status: COMPLETED | OUTPATIENT
Start: 2018-04-16 | End: 2018-04-16

## 2018-04-16 RX ORDER — HALOPERIDOL DECANOATE 100 MG/ML
1 INJECTION INTRAMUSCULAR
Qty: 0 | Refills: 0 | COMMUNITY

## 2018-04-16 RX ORDER — DIVALPROEX SODIUM 500 MG/1
1 TABLET, DELAYED RELEASE ORAL
Qty: 0 | Refills: 0 | COMMUNITY

## 2018-04-16 RX ADMIN — HALOPERIDOL DECANOATE 5 MILLIGRAM(S): 100 INJECTION INTRAMUSCULAR at 16:25

## 2018-04-16 RX ADMIN — HALOPERIDOL DECANOATE 5 MILLIGRAM(S): 100 INJECTION INTRAMUSCULAR at 16:14

## 2018-04-16 RX ADMIN — PANTOPRAZOLE SODIUM 40 MILLIGRAM(S): 20 TABLET, DELAYED RELEASE ORAL at 06:00

## 2018-04-16 RX ADMIN — HALOPERIDOL DECANOATE 10 MILLIGRAM(S): 100 INJECTION INTRAMUSCULAR at 05:57

## 2018-04-16 RX ADMIN — SODIUM CHLORIDE 1 GRAM(S): 9 INJECTION INTRAMUSCULAR; INTRAVENOUS; SUBCUTANEOUS at 05:57

## 2018-04-16 RX ADMIN — Medication 50 MILLIGRAM(S): at 16:25

## 2018-04-16 RX ADMIN — ENOXAPARIN SODIUM 40 MILLIGRAM(S): 100 INJECTION SUBCUTANEOUS at 05:58

## 2018-04-16 RX ADMIN — Medication 2 MILLIGRAM(S): at 16:25

## 2018-04-16 RX ADMIN — DIVALPROEX SODIUM 500 MILLIGRAM(S): 500 TABLET, DELAYED RELEASE ORAL at 06:02

## 2018-04-16 RX ADMIN — Medication 2 MILLIGRAM(S): at 16:14

## 2018-04-16 RX ADMIN — DIVALPROEX SODIUM 500 MILLIGRAM(S): 500 TABLET, DELAYED RELEASE ORAL at 13:38

## 2018-04-16 RX ADMIN — SODIUM CHLORIDE 1 GRAM(S): 9 INJECTION INTRAMUSCULAR; INTRAVENOUS; SUBCUTANEOUS at 13:38

## 2018-04-16 NOTE — PROGRESS NOTE BEHAVIORAL HEALTH - PRIMARY DX
Bipolar affective disorder, currently manic, severe, with psychotic features
Bipolar affective disorder, currently manic, severe, with psychotic features

## 2018-04-16 NOTE — DISCHARGE NOTE ADULT - PATIENT PORTAL LINK FT
You can access the [facility] Patient Portal, offered by [facility], by registering with the following website: [email or web address]

## 2018-04-16 NOTE — DISCHARGE NOTE ADULT - MEDICATION SUMMARY - MEDICATIONS TO CHANGE
I will SWITCH the dose or number of times a day I take the medications listed below when I get home from the hospital:  None I will SWITCH the dose or number of times a day I take the medications listed below when I get home from the hospital:    ibuprofen 600 mg oral tablet  -- 1 tab(s) by mouth every 6 hours

## 2018-04-16 NOTE — PROGRESS NOTE ADULT - ASSESSMENT
Improved hyponatremia   psychogenic polydipsia   ? Depakote   Hold hypertonic saline   Watch labs om NaCl Tabs   Will  follow
Improved hyponatremia   Psychogenic polydipsia   Watch on Depakote   Watch labs on NaCl Tabs     Will  follow
Improved hyponatremia   Psychogenic polydipsia   Watch on Depakote   Watch labs on NaCl Tabs   Na stable
Improved hyponatremia   psychogenic polydipsia   ? Depakote   Hold hypertonic saline   Watch labs om NaCl Tabs   Will  follow
This is 62 yo M with PMHx of Bipolar Disorder with manic episodes, chronic hyponatremia with polydipsia and GERD- was sent to ED from [facility] due to hyponatremia 117. Pt had routine blood work at [facility] showed hyponatremia and hyperkalemia. Labs here in ED showed sodium 117, K 4.6. He was recently hospitalized with similar problem and was discharged on salt tablet on 04/04/2018. Pt has h/o polydipsia, cont to drink plenty of water at [facility]. Pt admitted to medicine, Pt kept on fluid restriction, all other home meds resumed. Evaluated by nephrology and recommendation appreciated. Pt BMP monitored and Na level improving. Pt also had manic episode with SI /HI- evaluated by psychiatry and continued on home meds- pt to go back to [facility] per psych.     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 Continue to monitor BMP.  - C/w fluid restriction. C/w salt tablets  - Nephrology consult appreciated        >H/o Bipolar disorder- Calm today. Psych consult appreciated. C/w 1:1 observation and c/w psych meds haldol bid, Depakote tid, and haldol /ativan IM as needed for agitation.   >H/o GERD - PPI  >DVT PPX- Lovenox
This is 62 yo M with PMHx of Bipolar Disorder with manic episodes, chronic hyponatremia with polydipsia and GERD- was sent to ED from [facility] due to hyponatremia 117. Pt had routine blood work at [facility] showed hyponatremia and hyperkalemia. Labs here in ED showed sodium 117, K 4.6. He was recently hospitalized with similar problem and was discharged on salt tablet on 04/04/2018. Pt has h/o polydipsia, cont to drink plenty of water at [facility]. Pt kept on fluid restriction, all other home meds resumed. Evaluated by nephrology and recommendation appreciated. Pt BMP monitored and Na level improving.     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 Continue to monitor BMP.  - C/w fluid restriction. C/w salt tablets  - Nephrology consult appreciated        >H/o Bipolar disorder- Had manic episode today with HI /SI as above. Psych consult appreciated. C/w 1:1 observation and psych meds Zyprexa 10 mg bid, Depakote tid, haldol /ativan as needed for agitation.   >H/o GERD - PPI  >DVT PPX- Lovenox
This is 64 yo M with PMHx of Bipolar Disorder with manic episodes, chronic hyponatremia with polydipsia and GERD- was sent to ED from [facility] due to hyponatremia 117. Pt had routine blood work at [facility] showed hyponatremia and hyperkalemia. Labs here in ED showed sodium 117, K 4.6. He was recently hospitalized with similar problem and was discharged on salt tablet on 04/04/2018. Pt has h/o polydipsia, cont to drink plenty of water at [facility]. Pt admitted to medicine, Pt kept on fluid restriction, all other home meds resumed. Evaluated by nephrology and recommendation appreciated. Pt BMP monitored and hyponatremia resolved. Pt also had manic episode with SI /HI- evaluated by psychiatry and continued on home meds- pt to go back to [facility] per psych.     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   - C/w fluid restriction. C/w salt tablets  - Nephrology consult appreciated        >H/o Bipolar disorder- Calm today. Psych consult appreciated. C/w 1:1 observation and c/w psych meds haldol bid, Depakote tid, and haldol /ativan IM as needed for agitation. Pt to go back to [facility]. Medically stable.   >H/o GERD - PPI  >DVT PPX- Lovenox

## 2018-04-16 NOTE — PROGRESS NOTE ADULT - PROVIDER SPECIALTY LIST ADULT
Hospitalist
Internal Medicine
Nephrology
Psychiatry
Nephrology

## 2018-04-16 NOTE — PROGRESS NOTE BEHAVIORAL HEALTH - NSBHCONSULTFOLLOWDETAILS_PSY_A_CORE FT
Return to [facility] once medically cleared
Return to [facility] once medically cleared

## 2018-04-16 NOTE — CHART NOTE - NSCHARTNOTEFT_GEN_A_CORE
Pt was suppose to be discharge to [facility] today, at the time time of discharge patient became very aggressive, violent and was refusing to go to [facility]. haldol 5 mg /ativan 2 mg given. I spoke to Psychiatrist CRISTELA Wooten- recommended to give additional haldol 5 /ativan 2 mg with benadryl 50 mg and patient should be transferred to inpatient psychiatric facility with 4 point restrain. Pt became calm after medication and will be discharged to [facility].

## 2018-04-16 NOTE — PROGRESS NOTE BEHAVIORAL HEALTH - SUMMARY
Patient is a 62  year old, male; domiciles; ; noncaregiver; unemployed; PPH of schizophrenia; with multiple prior psychiatric hospitalizations ; no known suicide attempts; no active substance abuse or known history   Recommend   Continue haldol 10 mg bid  Continue Depakote 500 mg tid  recommend Haldol 5 mg IM q8h prn and Ativan 2 mg IM q8h prn severe agitation.   continue constant observation.   Continue fluid restriction   Patient will return need to return to [facility] once medically cleared.
Patient is a 62  year old, male; domiciles; ; noncaregiver; unemployed; PPH of schizophrenia; with multiple prior psychiatric hospitalizations ; no known suicide attempts; no active substance abuse or known history   Recommend   Continue haldol 10 mg bid  Continue Depakote 500 mg tid  recommend Haldol 5 mg IM q8h prn and Ativan 2 mg IM q8h prn severe agitation.   continue constant observation.   Continue fluid restriction   return to [facility]; 2pc placed in chart.

## 2018-04-16 NOTE — PROGRESS NOTE BEHAVIORAL HEALTH - NSBHFUPINTERVALHXFT_PSY_A_CORE
Patient is irritable, states he feels fine and wants to be discharged home, responds inappropriately to questions asked. he states he was being harrased in prior hospital room and now that he is moved he is taken out of hell. Patient begans laughing inappropriately, refers to Vesta Rico ,then quickly becomes rageful when further questions are asked, wants to be left alone, ripped up status and rights when given.   CO 1:1 states patient continues to request high volumes of drinks.
Patient initially reports he needs a  and cannot undsertand writer appears irritated , when it was obtained patient refused to speak to writer, made vulgar gestures and puts headphones back on  did not require PRN for agitation overnight

## 2018-04-16 NOTE — PROGRESS NOTE BEHAVIORAL HEALTH - NSBHCHARTREVIEWVS_PSY_A_CORE FT
Vital Signs Last 24 Hrs  T(C): 36.8 (16 Apr 2018 10:57), Max: 37.2 (15 Apr 2018 16:14)  T(F): 98.3 (16 Apr 2018 10:57), Max: 98.9 (15 Apr 2018 16:14)  HR: 84 (16 Apr 2018 10:57) (77 - 84)  BP: 117/77 (16 Apr 2018 10:57) (117/77 - 155/82)  BP(mean): --  RR: 20 (16 Apr 2018 10:57) (18 - 20)  SpO2: 99% (16 Apr 2018 10:57) (98% - 99%)
Vital Signs Last 24 Hrs  T(C): 36.8 (14 Apr 2018 11:02), Max: 37.1 (13 Apr 2018 12:31)  T(F): 98.3 (14 Apr 2018 11:02), Max: 98.7 (13 Apr 2018 12:31)  HR: 84 (14 Apr 2018 11:02) (59 - 84)  BP: 138/70 (14 Apr 2018 11:02) (100/52 - 138/70)  BP(mean): --  RR: 18 (14 Apr 2018 11:02) (18 - 18)  SpO2: 98% (14 Apr 2018 11:02) (96% - 98%)

## 2018-04-16 NOTE — DISCHARGE NOTE ADULT - MEDICATION SUMMARY - MEDICATIONS TO STOP TAKING
I will STOP taking the medications listed below when I get home from the hospital:    ZyPREXA Zydis 10 mg oral tablet, disintegrating  -- 1 tab(s) by mouth 2 times a day(10AM & 9PM)    ZyPREXA Zydis 5 mg oral tablet, disintegrating  -- 1 tab(s) by mouth every 6 hours, As Needed I will STOP taking the medications listed below when I get home from the hospital:    ZyPREXA Zydis 10 mg oral tablet, disintegrating  -- 1 tab(s) by mouth 2 times a day(10AM & 9PM)    ZyPREXA Zydis 5 mg oral tablet, disintegrating  -- 1 tab(s) by mouth every 6 hours, As Needed    clonazePAM 1 mg oral tablet  -- 1 tab(s) by mouth 2 times a day

## 2018-04-16 NOTE — DISCHARGE NOTE ADULT - PLAN OF CARE
Resolved. Resolved. Na level normal. 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 Continue with fluid restriction 1.5L per day. Pt to be transferred back to [facility]. Pt to be transferred back to [facility]. Management per psychiatrist at [facility]. C/w home medicine and f/u with PMD. Pt to be transferred back to [facility]. Management per psychiatrist at [facility]. P on Haldol 10 mg bid, Depakote 500 mg tid per psychiatrist. C/w home meds.

## 2018-04-16 NOTE — DISCHARGE NOTE ADULT - HOSPITAL COURSE
This is 64 yo M with PMHx of Bipolar Disorder with manic episodes, chronic hyponatremia with polydipsia and GERD- was sent to ED from [facility] due to hyponatremia 117. Pt had routine blood work at [facility] showed hyponatremia and hyperkalemia. Labs here in ED showed sodium 117, K 4.6. He was recently hospitalized with similar problem and was discharged on salt tablet on 04/04/2018. Pt has h/o polydipsia, cont to drink plenty of water at [facility]. Pt admitted to medicine, Pt kept on fluid restriction, all other home meds resumed. Evaluated by nephrology and recommendation appreciated. Pt BMP monitored and hyponatremia resolved with fluid restriction. Pt also had manic episode with SI /HI- evaluated by psychiatry and continued on home meds- pt to go back to [facility] per psych. Medically stable.    PHYSICAL EXAM:    Vital Signs Last 24 Hrs  T(C): 36.7 (16 Apr 2018 06:12), Max: 37.2 (15 Apr 2018 16:14)  T(F): 98 (16 Apr 2018 06:12), Max: 98.9 (15 Apr 2018 16:14)  HR: 77 (16 Apr 2018 06:12) (74 - 84)  BP: 137/77 (16 Apr 2018 06:12) (134/74 - 155/82)  BP(mean): --  RR: 20 (16 Apr 2018 06:12) (17 - 20)  SpO2: 98% (15 Apr 2018 16:14) (98% - 98%)    GENERAL: Pt lying comfortably, NAD.  CHEST/LUNG: Clear to auscultation bilaterally; No wheezing.  HEART: S1S2+, Regular rate and rhythm; No murmurs.  ABDOMEN: Soft, Nontender, Nondistended; Bowel sounds present.  Extremities: No LE edema, pulses +  NEURO: Awake/alert, no focal deficits, no motor r sensory loss.    Total time on discharge 35 minutes. This is 64 yo M with PMHx of HTN, Bipolar Disorder with manic episodes, chronic hyponatremia with polydipsia and GERD- was sent to ED from [facility] due to hyponatremia 117. Pt had routine blood work at [facility] showed hyponatremia and hyperkalemia. Labs here in ED showed sodium 117, K 4.6. He was recently hospitalized with similar problem and was discharged on salt tablet on 04/04/2018. Pt has h/o polydipsia, cont to drink plenty of water at [facility]. Pt admitted to medicine, Pt kept on fluid restriction, all other home meds resumed. Evaluated by nephrology and recommendation appreciated. Pt BMP monitored and hyponatremia resolved with fluid restriction. He was evaluated by nephrology and recommendation appreciated. Pt also had manic episode with SI /HI- evaluated by psychiatry and continued on home meds- pt to go back to [facility] per psych. Medically stable.    PHYSICAL EXAM:    Vital Signs Last 24 Hrs  T(C): 36.7 (16 Apr 2018 06:12), Max: 37.2 (15 Apr 2018 16:14)  T(F): 98 (16 Apr 2018 06:12), Max: 98.9 (15 Apr 2018 16:14)  HR: 77 (16 Apr 2018 06:12) (74 - 84)  BP: 137/77 (16 Apr 2018 06:12) (134/74 - 155/82)  BP(mean): --  RR: 20 (16 Apr 2018 06:12) (17 - 20)  SpO2: 98% (15 Apr 2018 16:14) (98% - 98%)    GENERAL: Pt lying comfortably, NAD.  CHEST/LUNG: Clear to auscultation bilaterally; No wheezing.  HEART: S1S2+, Regular rate and rhythm; No murmurs.  ABDOMEN: Soft, Nontender, Nondistended; Bowel sounds present.  Extremities: No LE edema, pulses +  NEURO: Awake/alert, no focal deficits, no motor r sensory loss.    Total time on discharge 35 minutes.

## 2018-04-16 NOTE — DISCHARGE NOTE ADULT - CARE PROVIDER_API CALL
PMD,   Phone: (   )    -  Fax: (   )    -    Psychiatrist at [facility].,   Phone: (   )    -  Fax: (   )    -

## 2018-04-16 NOTE — DISCHARGE NOTE ADULT - PROVIDER TOKENS
FREE:[LAST:[PMD],PHONE:[(   )    -],FAX:[(   )    -]],FREE:[LAST:[Psychiatrist at [facility].],PHONE:[(   )    -],FAX:[(   )    -]]

## 2018-04-16 NOTE — DISCHARGE NOTE ADULT - CARE PLAN
Principal Discharge DX:	Hyponatremia  Goal:	Resolved.  Assessment and plan of treatment:	Resolved. Na level normal. 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 Continue with fluid restriction 1.5L per day. Pt to be transferred back to [facility].  Secondary Diagnosis:	Bipolar 1 disorder  Assessment and plan of treatment:	Pt to be transferred back to [facility]. Management per psychiatrist at [facility].  Secondary Diagnosis:	Gastroesophageal reflux disease without esophagitis  Assessment and plan of treatment:	C/w home medicine and f/u with PMD. Principal Discharge DX:	Hyponatremia  Goal:	Resolved.  Assessment and plan of treatment:	Resolved. Na level normal. 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 Continue with fluid restriction 1.5L per day. Pt to be transferred back to [facility].  Secondary Diagnosis:	Bipolar 1 disorder  Assessment and plan of treatment:	Pt to be transferred back to [facility]. Management per psychiatrist at [facility]. P on Haldol 10 mg bid, Depakote 500 mg tid per psychiatrist.  Secondary Diagnosis:	Gastroesophageal reflux disease without esophagitis  Assessment and plan of treatment:	C/w home medicine and f/u with PMD.  Secondary Diagnosis:	Essential hypertension  Assessment and plan of treatment:	C/w home meds.

## 2018-04-16 NOTE — PROGRESS NOTE ADULT - SUBJECTIVE AND OBJECTIVE BOX
NEPHROLOGY INTERVAL HPI/OVERNIGHT EVENTS:    No new events   No complaints     MEDICATIONS  (STANDING):  diVALproex  milliGRAM(s) Oral three times a day  enoxaparin Injectable 40 milliGRAM(s) SubCutaneous every 24 hours  haloperidol     Tablet 10 milliGRAM(s) Oral every 12 hours  pantoprazole    Tablet 40 milliGRAM(s) Oral before breakfast  sodium chloride 1 Gram(s) Oral three times a day    MEDICATIONS  (PRN):  haloperidol    Injectable 5 milliGRAM(s) IntraMuscular every 8 hours PRN agitation  LORazepam   Injectable 2 milliGRAM(s) IntraMuscular every 8 hours PRN Agitation      Allergies    No Known Allergies    Intolerances          Vital Signs Last 24 Hrs  T(C): 36.8 (16 Apr 2018 10:57), Max: 37.2 (15 Apr 2018 16:14)  T(F): 98.3 (16 Apr 2018 10:57), Max: 98.9 (15 Apr 2018 16:14)  HR: 84 (16 Apr 2018 10:57) (77 - 84)  BP: 117/77 (16 Apr 2018 10:57) (117/77 - 155/82)  BP(mean): --  RR: 20 (16 Apr 2018 10:57) (18 - 20)  SpO2: 99% (16 Apr 2018 10:57) (98% - 99%)  Daily     Daily   I&O's Detail    15 Apr 2018 07:01  -  16 Apr 2018 07:00  --------------------------------------------------------  IN:    Oral Fluid: 773 mL  Total IN: 773 mL    OUT:    Voided: 570 mL  Total OUT: 570 mL    Total NET: 203 mL      16 Apr 2018 07:01  -  16 Apr 2018 12:26  --------------------------------------------------------  IN:    Oral Fluid: 360 mL  Total IN: 360 mL    OUT:    Voided: 400 mL  Total OUT: 400 mL    Total NET: -40 mL        I&O's Summary    15 Apr 2018 07:01  -  16 Apr 2018 07:00  --------------------------------------------------------  IN: 773 mL / OUT: 570 mL / NET: 203 mL    16 Apr 2018 07:01  -  16 Apr 2018 12:26  --------------------------------------------------------  IN: 360 mL / OUT: 400 mL / NET: -40 mL        PHYSICAL EXAM:    HEENT - No edema   NECK: Supple, No JVD,   CHEST/LUNG: Clear  HEART: Regular rate and rhythm; No rub  ABDOMEN: Soft, Nontender,   EXTREMITIES:  no edema    LABS:    04-15    136  |  98  |  14.0  ----------------------------<  93  5.1   |  30.0<H>  |  0.89    Ca    9.2      15 Apr 2018 07:27                  RADIOLOGY & ADDITIONAL TESTS:

## 2018-04-16 NOTE — DISCHARGE NOTE ADULT - MEDICATION SUMMARY - MEDICATIONS TO TAKE
I will START or STAY ON the medications listed below when I get home from the hospital:    Motrin 400 mg oral tablet  -- 1 tab(s) by mouth every 6 hours, As Needed  -- Indication: For Pain    Mylanta oral suspension  -- 30 milliliter(s) by mouth every 4 hours, As Needed  -- Indication: For Indigestion    Depakote 500 mg oral delayed release tablet  -- 1 tab(s) by mouth 3 times a day    10a, 2p, 9 p   -- Indication: For Bipolar disorder    Ativan 2 mg oral tablet  -- 1 tab(s) by mouth 3 times a day, As Needed  -- Indication: For Agitation    Haldol 10 mg oral tablet  -- 1 tab(s) by mouth 2 times a day  -- Indication: For Agitation    Sodium Chloride 1 g oral tablet  -- 1 tab(s) by mouth 3 times a day  -- Indication: For Hyponatremia    PriLOSEC 20 mg oral delayed release capsule  -- 1 cap(s) by mouth once a day  -- Indication: For GERD I will START or STAY ON the medications listed below when I get home from the hospital:     mg oral tablet  -- 1 tab(s) by mouth every 6 hours as needed  -- Indication: For Pain    lisinopril 5 mg oral tablet  -- 1 tab(s) by mouth once a day  -- Indication: For HTN    Depakote  mg oral tablet, extended release  -- 1 tab(s) by mouth 3 times a day  -- Indication: For Bipolar 1 disorder    haloperidol 10 mg oral tablet  -- 1 tab(s) by mouth 2 times a day  -- Indication: For Bipolar 1 disorder    amLODIPine 10 mg oral tablet  -- 1 tab(s) by mouth once a day  -- Indication: For HTN    ammonium lactate 12% topical cream  -- Apply on skin to affected area (feet) 2 times a day  -- Indication: For Home meds    Sodium Chloride 1 g oral tablet  -- 1 tab(s) by mouth 3 times a day  -- Indication: For Hyponatremia

## 2018-04-16 NOTE — PROGRESS NOTE BEHAVIORAL HEALTH - NSBHCHARTREVIEWLAB_PSY_A_CORE FT
13.2   7.0   )-----------( 334      ( 2018 06:58 )             38.4     04-13    130<L>  |  92<L>  |  12.0  ----------------------------<  98  4.5   |  28.0  |  0.66    Ca    8.7      2018 06:58  Phos  3.8     04-12  Mg     1.8     04-12    TPro  7.0  /  Alb  3.9  /  TBili  0.3<L>  /  DBili  x   /  AST  16  /  ALT  11  /  AlkPhos  55  04-13    LIVER FUNCTIONS - ( 2018 06:58 )  Alb: 3.9 g/dL / Pro: 7.0 g/dL / ALK PHOS: 55 U/L / ALT: 11 U/L / AST: 16 U/L / GGT: x             Urinalysis Basic - ( 2018 16:59 )    Color: Yellow / Appearance: Clear / S.010 / pH: x  Gluc: x / Ketone: Negative  / Bili: Negative / Urobili: Negative mg/dL   Blood: x / Protein: Negative mg/dL / Nitrite: Negative   Leuk Esterase: Negative / RBC: x / WBC x   Sq Epi: x / Non Sq Epi: x / Bacteria: x
13.2   7.0   )-----------( 334      ( 2018 06:58 )             38.4     04-13    130<L>  |  92<L>  |  12.0  ----------------------------<  98  4.5   |  28.0  |  0.66    Ca    8.7      2018 06:58  Phos  3.8     04-12  Mg     1.8     04-12    TPro  7.0  /  Alb  3.9  /  TBili  0.3<L>  /  DBili  x   /  AST  16  /  ALT  11  /  AlkPhos  55  04-13    LIVER FUNCTIONS - ( 2018 06:58 )  Alb: 3.9 g/dL / Pro: 7.0 g/dL / ALK PHOS: 55 U/L / ALT: 11 U/L / AST: 16 U/L / GGT: x             Urinalysis Basic - ( 2018 16:59 )    Color: Yellow / Appearance: Clear / S.010 / pH: x  Gluc: x / Ketone: Negative  / Bili: Negative / Urobili: Negative mg/dL   Blood: x / Protein: Negative mg/dL / Nitrite: Negative   Leuk Esterase: Negative / RBC: x / WBC x   Sq Epi: x / Non Sq Epi: x / Bacteria: x

## 2018-04-16 NOTE — PROGRESS NOTE BEHAVIORAL HEALTH - RISK ASSESSMENT
High-Pt is paranoid, with poor insight, poor judgment, depressed mood, global insomnia, labile mood,  making suicidal statements, non compliant with medication.
High-Pt is paranoid, with poor insight, poor judgment, irritable labile mood, disorganized speech and behavior

## 2018-09-06 NOTE — ED ADULT NURSE NOTE - DRUG PRE-SCREENING (DAST -1)
85 year old female with past medical hx of osteoarthritis and macular degeneration presented to the ED today after complaints of light headedness and decrease in vision. Her daughter is at the bedside and states patient has baseline vision loss from macular degeneration but is able to see peripherally. She noticed over the past few days her mom has been slower to speak and her speech had become more slurred. Patient states since yesterday she feels like she is leaning towards one side when she walks. She denies any unilateral weakness or facial droop. MRI was completed in the ED revealing multifocal punctate regions of restricted diffusion. Will admit to Vascular Neurology for stroke work up.     On exam patient moves all extremities 5/5. Hard to assess visual fields due to vision loss from macular degeneration. Patient stating her vision is not blurrier then normal but daughter stating it is. Patient Oriented X 3 but slow respond. Dysarthria is present on exam.    Antithrombotics for secondary stroke prevention: Antiplatelets: Aspirin: 81 mg daily    Statins for secondary stroke prevention and hyperlipidemia, if present:   Statins: Atorvastatin- 40 mg daily - LDL pending    Aggressive risk factor modification: HTN, HLD, Diet, Obesity     Rehab efforts: PT/OT/SLP to evaluate and treat    Diagnostics ordered/pending: CTA Head to assess vasculature , CTA Neck/Arch to assess vasculature, HgbA1C to assess blood glucose levels, Lipid Profile to assess cholesterol levels, TTE to assess cardiac function/status , TSH to assess thyroid function    VTE prophylaxis: Heparin 5000 units SQ every 8 hours and mechanical SCDS    BP parameters: Infarct: No intervention, SBP <220       Statement Selected

## 2019-03-16 NOTE — ED ADULT NURSE NOTE - CAS DISCH TRANSFER METHOD
Vitally stable, no complaints of pain or nausea. Up to bathroom with assist of one (lower extremity weakness). Good appetite on 2 gm sodium diet. Voiding adequately. Lactulose held due to frequent BMs. Potassium replaced orally.   Ambulance

## 2019-04-01 ENCOUNTER — INPATIENT (INPATIENT)
Facility: HOSPITAL | Age: 64
LOS: 3 days | Discharge: PSYCHIATRIC OR SUBSTANCE USE FACILITY | DRG: 885 | End: 2019-04-05
Attending: HOSPITALIST | Admitting: STUDENT IN AN ORGANIZED HEALTH CARE EDUCATION/TRAINING PROGRAM
Payer: MEDICARE

## 2019-04-01 VITALS
RESPIRATION RATE: 20 BRPM | DIASTOLIC BLOOD PRESSURE: 99 MMHG | HEART RATE: 92 BPM | OXYGEN SATURATION: 100 % | TEMPERATURE: 98 F | SYSTOLIC BLOOD PRESSURE: 201 MMHG

## 2019-04-01 DIAGNOSIS — F29 UNSPECIFIED PSYCHOSIS NOT DUE TO A SUBSTANCE OR KNOWN PHYSIOLOGICAL CONDITION: ICD-10-CM

## 2019-04-01 LAB
ALBUMIN SERPL ELPH-MCNC: 4.4 G/DL — SIGNIFICANT CHANGE UP (ref 3.3–5.2)
ALP SERPL-CCNC: 54 U/L — SIGNIFICANT CHANGE UP (ref 40–120)
ALT FLD-CCNC: 39 U/L — SIGNIFICANT CHANGE UP
AMPHET UR-MCNC: NEGATIVE — SIGNIFICANT CHANGE UP
ANION GAP SERPL CALC-SCNC: 14 MMOL/L — SIGNIFICANT CHANGE UP (ref 5–17)
APAP SERPL-MCNC: <7.5 UG/ML — LOW (ref 10–26)
APPEARANCE UR: CLEAR — SIGNIFICANT CHANGE UP
AST SERPL-CCNC: 42 U/L — HIGH
BACTERIA # UR AUTO: ABNORMAL
BARBITURATES UR SCN-MCNC: NEGATIVE — SIGNIFICANT CHANGE UP
BASOPHILS # BLD AUTO: 0 K/UL — SIGNIFICANT CHANGE UP (ref 0–0.2)
BASOPHILS NFR BLD AUTO: 0.6 % — SIGNIFICANT CHANGE UP (ref 0–2)
BENZODIAZ UR-MCNC: NEGATIVE — SIGNIFICANT CHANGE UP
BILIRUB SERPL-MCNC: 0.6 MG/DL — SIGNIFICANT CHANGE UP (ref 0.4–2)
BILIRUB UR-MCNC: NEGATIVE — SIGNIFICANT CHANGE UP
BUN SERPL-MCNC: 4 MG/DL — LOW (ref 8–20)
CALCIUM SERPL-MCNC: 8.4 MG/DL — LOW (ref 8.6–10.2)
CHLORIDE SERPL-SCNC: 91 MMOL/L — LOW (ref 98–107)
CO2 SERPL-SCNC: 21 MMOL/L — LOW (ref 22–29)
COCAINE METAB.OTHER UR-MCNC: NEGATIVE — SIGNIFICANT CHANGE UP
COLOR SPEC: YELLOW — SIGNIFICANT CHANGE UP
CREAT SERPL-MCNC: 0.65 MG/DL — SIGNIFICANT CHANGE UP (ref 0.5–1.3)
DIFF PNL FLD: NEGATIVE — SIGNIFICANT CHANGE UP
EOSINOPHIL # BLD AUTO: 0 K/UL — SIGNIFICANT CHANGE UP (ref 0–0.5)
EOSINOPHIL NFR BLD AUTO: 0.6 % — SIGNIFICANT CHANGE UP (ref 0–5)
EPI CELLS # UR: SIGNIFICANT CHANGE UP
ETHANOL SERPL-MCNC: <10 MG/DL — SIGNIFICANT CHANGE UP
GLUCOSE SERPL-MCNC: 82 MG/DL — SIGNIFICANT CHANGE UP (ref 70–115)
GLUCOSE UR QL: NEGATIVE MG/DL — SIGNIFICANT CHANGE UP
HCT VFR BLD CALC: 33.5 % — LOW (ref 42–52)
HGB BLD-MCNC: 12.4 G/DL — LOW (ref 14–18)
HYPERCHROMIA BLD QL AUTO: SLIGHT — SIGNIFICANT CHANGE UP
KETONES UR-MCNC: NEGATIVE — SIGNIFICANT CHANGE UP
LEUKOCYTE ESTERASE UR-ACNC: NEGATIVE — SIGNIFICANT CHANGE UP
LIDOCAIN IGE QN: 36 U/L — SIGNIFICANT CHANGE UP (ref 22–51)
LYMPHOCYTES # BLD AUTO: 0.9 K/UL — LOW (ref 1–4.8)
LYMPHOCYTES # BLD AUTO: 12.6 % — LOW (ref 20–55)
MCHC RBC-ENTMCNC: 29.1 PG — SIGNIFICANT CHANGE UP (ref 27–31)
MCHC RBC-ENTMCNC: 37 G/DL — HIGH (ref 32–36)
MCV RBC AUTO: 78.6 FL — LOW (ref 80–94)
METHADONE UR-MCNC: NEGATIVE — SIGNIFICANT CHANGE UP
MICROCYTES BLD QL: SLIGHT — SIGNIFICANT CHANGE UP
MONOCYTES # BLD AUTO: 1.2 K/UL — HIGH (ref 0–0.8)
MONOCYTES NFR BLD AUTO: 16.3 % — HIGH (ref 3–10)
NEUTROPHILS # BLD AUTO: 5 K/UL — SIGNIFICANT CHANGE UP (ref 1.8–8)
NEUTROPHILS NFR BLD AUTO: 69.8 % — SIGNIFICANT CHANGE UP (ref 37–73)
NITRITE UR-MCNC: NEGATIVE — SIGNIFICANT CHANGE UP
OPIATES UR-MCNC: NEGATIVE — SIGNIFICANT CHANGE UP
PCP SPEC-MCNC: SIGNIFICANT CHANGE UP
PCP UR-MCNC: NEGATIVE — SIGNIFICANT CHANGE UP
PH UR: 6 — SIGNIFICANT CHANGE UP (ref 5–8)
PLAT MORPH BLD: NORMAL — SIGNIFICANT CHANGE UP
PLATELET # BLD AUTO: 286 K/UL — SIGNIFICANT CHANGE UP (ref 150–400)
POTASSIUM SERPL-MCNC: 3.5 MMOL/L — SIGNIFICANT CHANGE UP (ref 3.5–5.3)
POTASSIUM SERPL-SCNC: 3.5 MMOL/L — SIGNIFICANT CHANGE UP (ref 3.5–5.3)
PROT SERPL-MCNC: 7.2 G/DL — SIGNIFICANT CHANGE UP (ref 6.6–8.7)
PROT UR-MCNC: 30 MG/DL
RBC # BLD: 4.26 M/UL — LOW (ref 4.6–6.2)
RBC # FLD: 13.3 % — SIGNIFICANT CHANGE UP (ref 11–15.6)
RBC BLD AUTO: SIGNIFICANT CHANGE UP
RBC CASTS # UR COMP ASSIST: SIGNIFICANT CHANGE UP /HPF (ref 0–4)
SALICYLATES SERPL-MCNC: <0.6 MG/DL — LOW (ref 10–20)
SODIUM SERPL-SCNC: 126 MMOL/L — LOW (ref 135–145)
SP GR SPEC: 1.01 — SIGNIFICANT CHANGE UP (ref 1.01–1.02)
THC UR QL: NEGATIVE — SIGNIFICANT CHANGE UP
TSH SERPL-MCNC: 2.19 UIU/ML — SIGNIFICANT CHANGE UP (ref 0.27–4.2)
UROBILINOGEN FLD QL: NEGATIVE MG/DL — SIGNIFICANT CHANGE UP
WBC # BLD: 7.1 K/UL — SIGNIFICANT CHANGE UP (ref 4.8–10.8)
WBC # FLD AUTO: 7.1 K/UL — SIGNIFICANT CHANGE UP (ref 4.8–10.8)
WBC UR QL: SIGNIFICANT CHANGE UP

## 2019-04-01 PROCEDURE — 99223 1ST HOSP IP/OBS HIGH 75: CPT

## 2019-04-01 PROCEDURE — 93010 ELECTROCARDIOGRAM REPORT: CPT

## 2019-04-01 RX ORDER — SODIUM CHLORIDE 9 MG/ML
1000 INJECTION INTRAMUSCULAR; INTRAVENOUS; SUBCUTANEOUS ONCE
Qty: 0 | Refills: 0 | Status: DISCONTINUED | OUTPATIENT
Start: 2019-04-01 | End: 2019-04-01

## 2019-04-01 RX ORDER — HALOPERIDOL DECANOATE 100 MG/ML
5 INJECTION INTRAMUSCULAR EVERY 8 HOURS
Qty: 0 | Refills: 0 | Status: DISCONTINUED | OUTPATIENT
Start: 2019-04-01 | End: 2019-04-05

## 2019-04-01 RX ORDER — PANTOPRAZOLE SODIUM 20 MG/1
40 TABLET, DELAYED RELEASE ORAL
Qty: 0 | Refills: 0 | Status: DISCONTINUED | OUTPATIENT
Start: 2019-04-01 | End: 2019-04-03

## 2019-04-01 RX ORDER — HEPARIN SODIUM 5000 [USP'U]/ML
5000 INJECTION INTRAVENOUS; SUBCUTANEOUS EVERY 8 HOURS
Qty: 0 | Refills: 0 | Status: DISCONTINUED | OUTPATIENT
Start: 2019-04-01 | End: 2019-04-05

## 2019-04-01 RX ORDER — HALOPERIDOL DECANOATE 100 MG/ML
5 INJECTION INTRAMUSCULAR ONCE
Qty: 0 | Refills: 0 | Status: DISCONTINUED | OUTPATIENT
Start: 2019-04-01 | End: 2019-04-01

## 2019-04-01 RX ADMIN — Medication 2 MILLIGRAM(S): at 20:00

## 2019-04-01 RX ADMIN — PANTOPRAZOLE SODIUM 40 MILLIGRAM(S): 20 TABLET, DELAYED RELEASE ORAL at 21:32

## 2019-04-01 NOTE — ED ADULT NURSE NOTE - NSIMPLEMENTINTERV_GEN_ALL_ED
Implemented All Fall with Harm Risk Interventions:  [place] to call system. Call bell, personal items and telephone within reach. Instruct patient to call for assistance. Room bathroom lighting operational. Non-slip footwear when patient is off stretcher. Physically safe environment: no spills, clutter or unnecessary equipment. Stretcher in lowest position, wheels locked, appropriate side rails in place. Provide visual cue, wrist band, yellow gown, etc. Monitor gait and stability. Monitor for mental status changes and reorient to person, place, and time. Review medications for side effects contributing to fall risk. Reinforce activity limits and safety measures with patient and family. Provide visual clues: red socks.

## 2019-04-01 NOTE — ED PROVIDER NOTE - CARE PLAN
Principal Discharge DX:	Psychosis Principal Discharge DX:	Psychosis  Secondary Diagnosis:	Ingestion of bleach, undetermined intent, initial encounter

## 2019-04-01 NOTE — ED PROVIDER NOTE - OBJECTIVE STATEMENT
59M with pmhx of HTN, Bipolar Disorder with manic episodes, chronic hyponatremia with polydipsia and GERD present to ED sent in by daughter after he was covered in Ajax at home, and she suspected an ingestion. Patient with episode of psychosis in ED, stating that he "will not talk to black people, or people of color. Black objects can not be put on him." He is laughing at inappropriate times. Unable to provide history.

## 2019-04-01 NOTE — ED PROVIDER NOTE - PHYSICAL EXAMINATION
Gayla Multani, .:   GENERAL: Patient awake alert NAD.  HEENT: NC/AT, Moist mucous membranes, PERRL, EOMI. No airway compromise, no visible burns or ulceration of the mouth or skin.  LUNGS: CTAB, no wheezes or crackles.   CARDIAC: RRR, no m/r/g.    ABDOMEN: Soft, NT, ND, No rebound, guarding.  EXT: No edema. No calf tenderness.  MSK: No spinal tenderness, no pain with movement, no deformities.  NEURO: A&Ox3. Moving all extremities.  SKIN: Warm and dry. No rash.  PSYCH: Psychosis

## 2019-04-01 NOTE — ED PROVIDER NOTE - CLINICAL SUMMARY MEDICAL DECISION MAKING FREE TEXT BOX
59M p/w psychosis and possible Ajax (bleach) injection. No visible signs of skin/mucosal erosion. Will get psych clearance labs, needs psych consult.

## 2019-04-01 NOTE — ED PROVIDER NOTE - ATTENDING CONTRIBUTION TO CARE
I, Katja Castillo, performed a face to face bedside interview with this patient regarding history of present illness, review of symptoms and relevant past medical, social and family history.  I completed an independent physical examination. Medical decision making, follow-up on ordered tests (ie labs, radiologic studies) and re-evaluation of the patient's status has been communicated to the Resident. Disposition of the patient will be based on test outcome and response to ED interventions.     Pt BIBEMS apparent h/o psych disorder not taking meds, also HTN not taking meds, progressive agitation recently, today eating AJAX. patient states 'nothing black' doesn't want anything black to touch him. believes he is 3million years old. not cooperative for history.     Gen: laughing inappropriately. loud outbursts.   Head: NCAT  HEENT: PERRL, EOMI, oral mucosa moist, normal conjunctiva, neck supple, no oropharyngeal burns  Lung: CTAB, no respiratory distress  CV: rrr, no murmur, Normal perfusion  Abd: soft, NTND  MSK: No edema, no visible deformities  Neuro: No focal neurologic deficits, faint tremor b/l UE  Skin: No rash   Psych: acute psychosis     acute psychosis, some response to verbal de-escalataion but will require chemical sedation for patient and other safety. wqiting for family to arrive. labs. CXR. bleach main ingredient of AJX will speak with tox. reasses for psych clearance

## 2019-04-01 NOTE — ED PROVIDER NOTE - NS ED MD DISPO DIVISION
Dear Carolyn Malave ,    On behalf of my care team, I would like to thank you for entrusting us with your care today. It is our goal to provide you and your family with excellent care. Please note that you may receive a survey in the mail. Your feedback is important to our team. If there is anything that we can do to serve you better, please let us know.   Once again, Thank you.    Your Rheumatology Team    [facility]  Office Hours: Monday-Friday 8:00am-4:45 pm,     I am out of the office Friday afternoon   Reach us by phone at [phone]  MD Amy Bergeron, OLGAN, RN  [facility]    If you feel that there is an urgent situation please go to the emergency.    Laboratory Testing:   Most the important results that help me make treatment decisions take a few days to come back. Most of the abnormal results for arthritis and autoimmune disease rarely require urgent action.  For xray results I also wait for the radiology to review the films before giving my final conclusion.     If you have portal access to [facility] you will get the results within 5-7 days.   Once you have had your blood drawn, please allow 10-15 days to receive your results by mail. (There are specialty labs that have to be sent outside of our facility and can take longer for our office to receive results.)  If your results are normal you may receive a letter in the mail.  If your results are abnormal you will receive a call from the office.  If you were referred to a Specialist for further treatment:   Please allow 1-5 days to be contacted by that Specialty office    If you are requesting prescription refills:   Please contact your pharmacy for all refills   We cannot refill pain medication after hours. You will be asked to see pain management if narcotic prescriptions that are to be refilled every month are needed.       Pressure stocking  Call back if worse  Back in 6 months    
[facility]

## 2019-04-01 NOTE — ED ADULT NURSE NOTE - ED STAT RN HANDOFF DETAILS
SALVADOR De La Torre.   Pt placed on cardiac monitor with . Pt response to verbal stimuli.  No resp distress noted.

## 2019-04-01 NOTE — H&P ADULT - NSICDXPASTMEDICALHX_GEN_ALL_CORE_FT
PAST MEDICAL HISTORY:  Poor historian   bipolar disorder  Schizophrenia   GERD  Chronic hyponatremia   HTN

## 2019-04-01 NOTE — ED ADULT TRIAGE NOTE - CHIEF COMPLAINT QUOTE
Pt presents to ED yelling and confused, as per pt daughter she came home to him eating powdered ajax and throwing out his furniture, has not taken any psych medications x 2 weeks, calm and cooperative depending on who he talks to, easily de-escalated when needed, placed into gown and belongings locked up

## 2019-04-01 NOTE — H&P ADULT - HISTORY OF PRESENT ILLNESS
64 y/o male with PMH of bipolar disorder, schizophrenia, HTN, GERD, chronic hyponatremia was brought to the ED via EMS for psychosis. As per daughter at bed side, patient has not been taking his medications for the last 2 weeks and he has been very aggressive and combative. Daughter said he has not been sleeping nor eating. She went to patient's house today and found Ajax and water in the fridge; when she asked her father why he has Ajax in the fridge he replied " that is the only thing that has been keeping me going" so she called EMS to bring him to the hospital. As per daughter, patient was recently seen in [place] for similar issue; he was hospitalized at the time for 2 months. She said patient has been doing fine since discharge able to carry out his ADLs and taking all his medications as prescribed until 2 weeks ago. No recent infection reported, no nausea/vomiting, change in bowel/urinary habit reported by daughter.     HPI as per daughter because patient sedated he received Ativan and Haldol for agitation prior to my evaluation. Daughter to bring medication list in AM.     Patient's name is [place] Jean Baptiste : 65 and MRN: [id number]. Patient was seen in [facility] last year April for hyponatremia.

## 2019-04-01 NOTE — ED PROVIDER NOTE - PROGRESS NOTE DETAILS
Pt name: Markel Slade 1955, MRN # [id number]. daughter arrived, not taking meds for 2 weeks, similar presentation in past. h/o BIPOLAR. daughter found him eating AJAX b/c it 'makes his stomach feel better' no vomit in house, sprayed clothes with Lysol. patient calm/cooperative at times depending on who confronts patient. will give haldol 5mg as patient still agitated, threatened wife at home. -Anna OLEA

## 2019-04-01 NOTE — ED PROVIDER NOTE - NOTES
AJAX usually just bleach, can contain Lye or Ammonium. due to psych history and acute psychosis- patient poor historian. could have ingested a significant amount. recommend observation overnight, GI consult. protonix BID, NPO after midnight, may scope in AM

## 2019-04-01 NOTE — H&P ADULT - ASSESSMENT
62 y/o male with PMH of bipolar disorder, schizophrenia, HTN, GERD, chronic hyponatremia was brought to the ED via EMS for psychosis. As per daughter at bed side, patient has not been taking his medications for the last 2 weeks and he has been very aggressive and combative. Daughter said he has not been sleeping nor eating. She went to patient's house today and found Ajax and water in the fridge; when she asked her father why he has Ajax in the fridge he replied " that is the only thing that has been keeping me going" so she called EMS to bring him to the hospital.     Ingestion of bleach   Admit to monitor bed   Poison controlled called in ED recommended GI consult   GI consulted; recommended PPI 40mg bid, NPO and will likely scope him in AM    Psychosis   Will give Haldol 5mg IM q8h PRN   Ativan 2mg IV q12h PRN   Psych consult   Daughter to bring medication list in AM     Chronic hyponatremia   Na: 126  Secondary to psychogenic polydipsia vs psych meds side effect   Patient is on NaCl as per daughter; continue as needed     Bipolar disorder   Continue home mediation when cleared by GI for PO     HTN   BP at goal now  Continue home medications as needed   Will give Hydralazine 5mg IV PRN as needed   Monitor BP     GERD   Continue PPI 40mg IV bid     Supportive   DVT prophylaxis: Heparin SC q8h   Diet: NPO; advance diet as needed.

## 2019-04-01 NOTE — ED ADULT NURSE NOTE - OBJECTIVE STATEMENT
Unable to assess due to pt medical condition. received pt in yellow gown for safety, Unable to assess pt due to pt medical condition. pt easily arousble to verbal stimuli, no signs so distress noted. pt placed on CM/pulse ox.  pt daughter arrives via bedside @ 21:49

## 2019-04-01 NOTE — ED PROVIDER NOTE - CRITICAL CARE PROVIDED
direct patient care (not related to procedure)/additional history taking/documentation/consult w/ pt's family directly relating to pts condition/interpretation of diagnostic studies/consultation with other physicians

## 2019-04-02 DIAGNOSIS — F20.0 PARANOID SCHIZOPHRENIA: ICD-10-CM

## 2019-04-02 LAB
ANION GAP SERPL CALC-SCNC: 13 MMOL/L — SIGNIFICANT CHANGE UP (ref 5–17)
BUN SERPL-MCNC: 3 MG/DL — LOW (ref 8–20)
CALCIUM SERPL-MCNC: 8.8 MG/DL — SIGNIFICANT CHANGE UP (ref 8.6–10.2)
CHLORIDE SERPL-SCNC: 95 MMOL/L — LOW (ref 98–107)
CO2 SERPL-SCNC: 22 MMOL/L — SIGNIFICANT CHANGE UP (ref 22–29)
CREAT SERPL-MCNC: 0.67 MG/DL — SIGNIFICANT CHANGE UP (ref 0.5–1.3)
GLUCOSE SERPL-MCNC: 98 MG/DL — SIGNIFICANT CHANGE UP (ref 70–115)
HCV AB S/CO SERPL IA: 0.13 S/CO — SIGNIFICANT CHANGE UP (ref 0–0.79)
HCV AB SERPL-IMP: SIGNIFICANT CHANGE UP
POTASSIUM SERPL-MCNC: 4 MMOL/L — SIGNIFICANT CHANGE UP (ref 3.5–5.3)
POTASSIUM SERPL-SCNC: 4 MMOL/L — SIGNIFICANT CHANGE UP (ref 3.5–5.3)
SODIUM SERPL-SCNC: 130 MMOL/L — LOW (ref 135–145)

## 2019-04-02 PROCEDURE — 99223 1ST HOSP IP/OBS HIGH 75: CPT

## 2019-04-02 PROCEDURE — 93010 ELECTROCARDIOGRAM REPORT: CPT

## 2019-04-02 PROCEDURE — 71045 X-RAY EXAM CHEST 1 VIEW: CPT | Mod: 26

## 2019-04-02 PROCEDURE — 90792 PSYCH DIAG EVAL W/MED SRVCS: CPT

## 2019-04-02 PROCEDURE — 99232 SBSQ HOSP IP/OBS MODERATE 35: CPT

## 2019-04-02 RX ORDER — SODIUM CHLORIDE 9 MG/ML
1000 INJECTION, SOLUTION INTRAVENOUS
Qty: 0 | Refills: 0 | Status: DISCONTINUED | OUTPATIENT
Start: 2019-04-02 | End: 2019-04-03

## 2019-04-02 RX ORDER — DIVALPROEX SODIUM 500 MG/1
500 TABLET, DELAYED RELEASE ORAL DAILY
Qty: 0 | Refills: 0 | Status: DISCONTINUED | OUTPATIENT
Start: 2019-04-03 | End: 2019-04-05

## 2019-04-02 RX ORDER — CLOZAPINE 150 MG/1
12.5 TABLET, ORALLY DISINTEGRATING ORAL
Qty: 0 | Refills: 0 | Status: DISCONTINUED | OUTPATIENT
Start: 2019-04-02 | End: 2019-04-03

## 2019-04-02 RX ADMIN — PANTOPRAZOLE SODIUM 40 MILLIGRAM(S): 20 TABLET, DELAYED RELEASE ORAL at 05:53

## 2019-04-02 RX ADMIN — Medication 2 MILLIGRAM(S): at 19:02

## 2019-04-02 RX ADMIN — HALOPERIDOL DECANOATE 5 MILLIGRAM(S): 100 INJECTION INTRAMUSCULAR at 15:32

## 2019-04-02 RX ADMIN — Medication 2 MILLIGRAM(S): at 03:57

## 2019-04-02 RX ADMIN — Medication 2 MILLIGRAM(S): at 09:20

## 2019-04-02 NOTE — PROGRESS NOTE ADULT - SUBJECTIVE AND OBJECTIVE BOX
Please note provider specialty: Emergency Medicine    I reponded to [place]; pt yelling, agitated about needing a place to go. Security and nurse administration at bedside.   Pt advised that daughter and SW would be contacted;  he continued to yell and stomp his feet.  2mg iv ativan ordered and given to  treat agitation.  Primary team contacted.

## 2019-04-02 NOTE — BEHAVIORAL HEALTH ASSESSMENT NOTE - NSBHCHARTREVIEWVS_PSY_A_CORE FT
Vital Signs Last 24 Hrs  T(C): 36.8 (02 Apr 2019 15:38), Max: 36.8 (01 Apr 2019 23:47)  T(F): 98.2 (02 Apr 2019 15:38), Max: 98.3 (01 Apr 2019 23:47)  HR: 89 (02 Apr 2019 15:38) (65 - 92)  BP: 158/75 (02 Apr 2019 15:38) (121/73 - 201/99)  BP(mean): 99 (02 Apr 2019 07:55) (99 - 99)  RR: 21 (02 Apr 2019 15:38) (20 - 21)  SpO2: 98% (02 Apr 2019 15:38) (94% - 100%)

## 2019-04-02 NOTE — CONSULT NOTE ADULT - ASSESSMENT
62 y/o man with schizophrenia and psychosis who was brought in after he stopped taking his meds and he had ingestion of bleach.    - NPO  - PPI IV BID  - EGD when hyponatremia improved  - monitor cbc       Thanks

## 2019-04-02 NOTE — BEHAVIORAL HEALTH ASSESSMENT NOTE - NSBHCHARTREVIEWLAB_PSY_A_CORE FT
12.4   7.1   )-----------( 286      ( 2019 20:28 )             33.5     04-02    130<L>  |  95<L>  |  3.0<L>  ----------------------------<  98  4.0   |  22.0  |  0.67    Ca    8.8      2019 07:41    TPro  7.2  /  Alb  4.4  /  TBili  0.6  /  DBili  x   /  AST  42<H>  /  ALT  39  /  AlkPhos  54  04-01    LIVER FUNCTIONS - ( 2019 20:28 )  Alb: 4.4 g/dL / Pro: 7.2 g/dL / ALK PHOS: 54 U/L / ALT: 39 U/L / AST: 42 U/L / GGT: x             Urinalysis Basic - ( 2019 20:47 )    Color: Yellow / Appearance: Clear / S.010 / pH: x  Gluc: x / Ketone: Negative  / Bili: Negative / Urobili: Negative mg/dL   Blood: x / Protein: 30 mg/dL / Nitrite: Negative   Leuk Esterase: Negative / RBC: 0-2 /HPF / WBC 0-2   Sq Epi: x / Non Sq Epi: Occasional / Bacteria: Occasional

## 2019-04-02 NOTE — PROGRESS NOTE ADULT - SUBJECTIVE AND OBJECTIVE BOX
seen for psychosis    periods of agitation, requiring ativan.  ros unable to obtain as sedated.     MEDICATIONS  (STANDING):  dextrose 5% + sodium chloride 0.45%. 1000 milliLiter(s) (100 mL/Hr) IV Continuous <Continuous>  heparin  Injectable 5000 Unit(s) SubCutaneous every 8 hours  pantoprazole  Injectable 40 milliGRAM(s) IV Push two times a day    MEDICATIONS  (PRN):  haloperidol    Injectable 5 milliGRAM(s) IntraMuscular every 8 hours PRN Agitation  LORazepam   Injectable 2 milliGRAM(s) IntraMuscular every 4 hours PRN Agitation      Allergies    No Known Allergies    Vital Signs Last 24 Hrs  T(C): 36.7 (2019 07:55), Max: 36.8 (2019 23:47)  T(F): 98 (2019 07:55), Max: 98.3 (2019 23:47)  HR: 83 (2019 07:55) (65 - 92)  BP: 149/73 (2019 07:55) (121/73 - 201/99)  BP(mean): 99 (2019 07:55) (99 - 99)  RR: 20 (2019 07:55) (20 - 20)  SpO2: 94% (2019 07:55) (94% - 100%)    PHYSICAL EXAM:    GENERAL: NAD  CHEST/LUNG: Clear to percussion bilaterally  HEART: Regular rate and rhythm; S1 S2  ABDOMEN: Soft,  Bowel sounds present  EXTREMITIES:  no edema     LABS:                        12.4   7.1   )-----------( 286      ( 2019 20:28 )             33.5     04-02    130<L>  |  95<L>  |  3.0<L>  ----------------------------<  98  4.0   |  22.0  |  0.67    Ca    8.8      2019 07:41    TPro  7.2  /  Alb  4.4  /  TBili  0.6  /  DBili  x   /  AST  42<H>  /  ALT  39  /  AlkPhos  54  04-01      Urinalysis Basic - ( 2019 20:47 )    Color: Yellow / Appearance: Clear / S.010 / pH: x  Gluc: x / Ketone: Negative  / Bili: Negative / Urobili: Negative mg/dL   Blood: x / Protein: 30 mg/dL / Nitrite: Negative   Leuk Esterase: Negative / RBC: 0-2 /HPF / WBC 0-2   Sq Epi: x / Non Sq Epi: Occasional / Bacteria: Occasional        CAPILLARY BLOOD GLUCOSE            RADIOLOGY & ADDITIONAL TESTS:

## 2019-04-02 NOTE — PROGRESS NOTE ADULT - ASSESSMENT
60 yo M w/ hx bipolar disorder, schizophrenia, HTN, GERD, chronic hyponatremia was brought to the ED via EMS for psychosis. As per daughter at bed side, patient has not been taking his medications for the last 2 weeks and he has been very aggressive and combative. Daughter said he has not been sleeping nor eating. She went to patient's house today and found Ajax and water in the fridge; when she asked her father why he has Ajax in the fridge he replied " that is the only thing that has been keeping me going" so she called EMS to bring him to the hospital.     Ingestion of bleach      EGD when stable      NPO, IVF,       PPI BID     Psychosis     prn haldol/ativan.   Psych consult   ---HOME MEDS----DIVALPROEX 500MG QAM AND CLOZAPINE 25MG BID    Chronic hyponatremia     improving, c/w iVF     Bipolar disorder    restart home meds when ok per GI     HTN   BP at goal now  Will give Hydralazine 5mg IV PRN as needed   Monitor BP     Supportive   DVT prophylaxis: lovenox   Diet: NPO; advance diet as needed.     updated daughter over the phone [phone]  (luis fernando)---states patient threw all identifying documents away.

## 2019-04-02 NOTE — CONSULT NOTE ADULT - SUBJECTIVE AND OBJECTIVE BOX
HISTORY OF PRESENT ILLNESS:  This is a 59y old Male with a past medical history significant for bipolar disorder, schizophrenia, HTN, GERD, chronic hyponatremia was brought to the ED via EMS for psychosis. As per chart patient has not been taking his medications for the last 2 weeks and he has been very aggressive and combative. Daughter said he has not been sleeping nor eating. She found Ajax and water in the fridge and believes that he ingested the Ajax. Patient was recently seen in [place] for similar issue; he was hospitalized at the time for 2 months. No nausea/vomiting/melena or hematemesis at home or since admission to the ED. Patient was given Ativan and Haldol for agitation prior to my evaluation. No medication list available. In the ED he was noted to have a hyponatremia and microcytic anemia.   Patient's name is [place] Jean Baptiste : 65 and MRN: [id number].      REVIEW OF SYSTEMS:  Constitutional:  No unintentional weight loss, fevers, chills or night sweats	  Eyes: No eye pain, redness, discharge, or proptosis  ENMT: No sore throat, ear pain, mouth sores, or swollen glands in the neck  Respiratory: No dyspnea, cough or wheezing  Cardiovascular: No chest pain, dyspnea on exertion, or orthopnea  Gastrointestinal:	Please see HPI  Genitourinary: No dysuria or hematuria  Neurological:	 No changes in sleep/wake cycle, convulsions, confusion, dizziness or lightheadedness  Psychiatric: No changes in personality or emotional problems   Hematology: No easy bruising   Endocrine: No hot or cold flashes or deepening of voice	  All other review of systems were completed and were otherwise negative save what is reported in the HPI.    PAST MEDICAL/SURGICAL HISTORY:  Poor historian:   bipolar disorder  Schizophrenia   GERD  Chronic hyponatremia   HTN    SOCIAL HISTORY:  - ILLICIT DRUG USE: Denies    FAMILY HISTORY:  No known history of gastrointestinal or liver disease;  Family history of diabetes mellitus (DM)      HOME MEDICATIONS:    INPATIENT MEDICATIONS:  MEDICATIONS  (STANDING):  heparin  Injectable 5000 Unit(s) SubCutaneous every 8 hours  pantoprazole  Injectable 40 milliGRAM(s) IV Push two times a day    MEDICATIONS  (PRN):  haloperidol    Injectable 5 milliGRAM(s) IntraMuscular every 8 hours PRN Agitation  LORazepam   Injectable 2 milliGRAM(s) IntraMuscular every 12 hours PRN Agitation    ALLERGIES:  No Known Allergies    VITAL SIGNS LAST 24 HOURS:  T(C): 36.7 (2019 07:55), Max: 36.8 (2019 23:47)  T(F): 98 (2019 07:55), Max: 98.3 (2019 23:47)  HR: 83 (2019 07:55) (65 - 92)  BP: 149/73 (2019 07:55) (121/73 - 201/99)  BP(mean): 99 (2019 07:55) (99 - 99)  RR: 20 (2019 07:55) (20 - 20)  SpO2: 94% (2019 07:55) (94% - 100%)            PHYSICAL EXAM:  Constitutional: Well-developed, well-nourished, in no apparent distress  Eyes: Sclerae anicteric, conjunctivae normal  ENMT: Mucus membranes moist, no oropharyngeal thrush noted  Neck: No thyroid nodules appreciated, no significant cervical or supraclavicular lymphadenopathy  Respiratory: Breathing nonlabored; clear to auscultation  Cardiovascular: Regular rate and rhythm  Gastrointestinal: Soft, nontender, nondistended, normoactive bowel sounds  Extremities: No clubbing, cyanosis or edema  Neurological: Alert and oriented to person, place and time; no asterixis  Skin: No jaundice  Lymph Nodes: No significant lymphadenopathy  Musculoskeletal: No significant peripheral atrophy  Psychiatric: Alert confused       LABS:                        12.4   7.1   )-----------( 286      ( 2019 20:28 )             33.5       04-01    126<L>  |  91<L>  |  4.0<L>  ----------------------------<  82  3.5   |  21.0<L>  |  0.65    Ca    8.4<L>      2019 20:28    TPro  7.2  /  Alb  4.4  /  TBili  0.6  /  DBili  x   /  AST  42<H>  /  ALT  39  /  AlkPhos  54  04-01    LIVER FUNCTIONS - ( 2019 20:28 )  Alb: 4.4 g/dL / Pro: 7.2 g/dL / ALK PHOS: 54 U/L / ALT: 39 U/L / AST: 42 U/L / GGT: x           Urinalysis Basic - ( 2019 20:47 )    Color: Yellow / Appearance: Clear / S.010 / pH: x  Gluc: x / Ketone: Negative  / Bili: Negative / Urobili: Negative mg/dL   Blood: x / Protein: 30 mg/dL / Nitrite: Negative   Leuk Esterase: Negative / RBC: 0-2 /HPF / WBC 0-2   Sq Epi: x / Non Sq Epi: Occasional / Bacteria: Occasional    IMAGING:  < from: Xray Chest 1 View- PORTABLE-Urgent (19 @ 00:18) >    IMPRESSION:   No evidence of active chest disease.          No radiopaque foreign body seen.    < end of copied text >

## 2019-04-02 NOTE — BEHAVIORAL HEALTH ASSESSMENT NOTE - HPI (INCLUDE ILLNESS QUALITY, SEVERITY, DURATION, TIMING, CONTEXT, MODIFYING FACTORS, ASSOCIATED SIGNS AND SYMPTOMS)
Patient is a 64 y/o male, domiciled alone, non caregiver, unemployed, with PPH of Bipolar disorder with multiple prior psychiatric hospitalizations; no known suicide attempts no active substance abuse or known history of complicated withdrawal; PMHx of GERD and recurrent hyponatremia.  Patient has h/o recurrent hypontremia.  Patient was brought to ER for psychosis.              As per recored daughter reported that , patient has not been taking his medications for the last 2 weeks and he has been very aggressive and combative. Daughter said he has not been sleeping nor eating. She went to patient's house today and found Ajax and water in the fridge; when she asked her father why he has Ajax in the fridge he replied " that is the only thing that has been keeping me going" so she called EMS to bring him to the hospital.     Writer spoke to patient in [place]ian. Patient was overly familiar with writer.  He admits that he not been taking his medications because it bothers his stomach.  He also denies that he took Ajax.  He reports he took Salt to make stomach feel better.  He reports that it is dangerous where he lives because people want to harm him. He also states that people want to harm him at home and in the lobby.  He stated that neigbors laugh at him, and speak to him through the walls at night.  He denies any desire to hurt himself or others but does not feel safe there. Writer reportedly takes Clozaril 25 mg twice daily and Depakote 500 mg in am.       Writer left message for ACT team and for daughter to call writer back .

## 2019-04-02 NOTE — BEHAVIORAL HEALTH ASSESSMENT NOTE - SUMMARY
Patient is a 64 y/o male, domiciled alone, non caregiver, unemployed, with PPH of Bipolar disorder with multiple prior psychiatric hospitalizations; no known suicide attempts no active substance abuse or known history of complicated withdrawal; PMHx of GERD and recurrent hyponatremia.  Patient has h/o recurrent hypontremia.  Patient was brought to ER for psychosis.  Patient has been non compliant with medications. He is paranoid and has been hearing voices.  He has not taken medications for two weeks.  Reportedly swallowed Ajax, to help with stomach.   Denies any active S/H I/I/P. Awaiting endoscopy.  Awaiting collateral from daughter and ACT team.

## 2019-04-03 LAB
ANION GAP SERPL CALC-SCNC: 10 MMOL/L — SIGNIFICANT CHANGE UP (ref 5–17)
BUN SERPL-MCNC: 6 MG/DL — LOW (ref 8–20)
CALCIUM SERPL-MCNC: 9.1 MG/DL — SIGNIFICANT CHANGE UP (ref 8.6–10.2)
CHLORIDE SERPL-SCNC: 95 MMOL/L — LOW (ref 98–107)
CO2 SERPL-SCNC: 24 MMOL/L — SIGNIFICANT CHANGE UP (ref 22–29)
CREAT SERPL-MCNC: 0.72 MG/DL — SIGNIFICANT CHANGE UP (ref 0.5–1.3)
GLUCOSE SERPL-MCNC: 117 MG/DL — HIGH (ref 70–115)
HCT VFR BLD CALC: 35.5 % — LOW (ref 42–52)
HGB BLD-MCNC: 12.7 G/DL — LOW (ref 14–18)
MCHC RBC-ENTMCNC: 28.7 PG — SIGNIFICANT CHANGE UP (ref 27–31)
MCHC RBC-ENTMCNC: 35.8 G/DL — SIGNIFICANT CHANGE UP (ref 32–36)
MCV RBC AUTO: 80.3 FL — SIGNIFICANT CHANGE UP (ref 80–94)
PLATELET # BLD AUTO: 265 K/UL — SIGNIFICANT CHANGE UP (ref 150–400)
POTASSIUM SERPL-MCNC: 4.1 MMOL/L — SIGNIFICANT CHANGE UP (ref 3.5–5.3)
POTASSIUM SERPL-SCNC: 4.1 MMOL/L — SIGNIFICANT CHANGE UP (ref 3.5–5.3)
RBC # BLD: 4.42 M/UL — LOW (ref 4.6–6.2)
RBC # FLD: 13.4 % — SIGNIFICANT CHANGE UP (ref 11–15.6)
SODIUM SERPL-SCNC: 129 MMOL/L — LOW (ref 135–145)
WBC # BLD: 10.9 K/UL — HIGH (ref 4.8–10.8)
WBC # FLD AUTO: 10.9 K/UL — HIGH (ref 4.8–10.8)

## 2019-04-03 PROCEDURE — 99232 SBSQ HOSP IP/OBS MODERATE 35: CPT

## 2019-04-03 RX ORDER — SODIUM CHLORIDE 9 MG/ML
1 INJECTION INTRAMUSCULAR; INTRAVENOUS; SUBCUTANEOUS THREE TIMES A DAY
Qty: 0 | Refills: 0 | Status: DISCONTINUED | OUTPATIENT
Start: 2019-04-03 | End: 2019-04-05

## 2019-04-03 RX ORDER — PANTOPRAZOLE SODIUM 20 MG/1
40 TABLET, DELAYED RELEASE ORAL
Qty: 0 | Refills: 0 | Status: DISCONTINUED | OUTPATIENT
Start: 2019-04-04 | End: 2019-04-05

## 2019-04-03 RX ORDER — DIVALPROEX SODIUM 500 MG/1
1000 TABLET, DELAYED RELEASE ORAL AT BEDTIME
Qty: 0 | Refills: 0 | Status: DISCONTINUED | OUTPATIENT
Start: 2019-04-03 | End: 2019-04-05

## 2019-04-03 RX ORDER — HALOPERIDOL DECANOATE 100 MG/ML
200 INJECTION INTRAMUSCULAR ONCE
Qty: 0 | Refills: 0 | Status: COMPLETED | OUTPATIENT
Start: 2019-04-03 | End: 2019-04-03

## 2019-04-03 RX ADMIN — Medication 2 MILLIGRAM(S): at 01:59

## 2019-04-03 RX ADMIN — HALOPERIDOL DECANOATE 200 MILLIGRAM(S): 100 INJECTION INTRAMUSCULAR at 19:48

## 2019-04-03 RX ADMIN — HEPARIN SODIUM 5000 UNIT(S): 5000 INJECTION INTRAVENOUS; SUBCUTANEOUS at 16:51

## 2019-04-03 RX ADMIN — Medication 2 MILLIGRAM(S): at 19:48

## 2019-04-03 RX ADMIN — PANTOPRAZOLE SODIUM 40 MILLIGRAM(S): 20 TABLET, DELAYED RELEASE ORAL at 05:20

## 2019-04-03 RX ADMIN — HEPARIN SODIUM 5000 UNIT(S): 5000 INJECTION INTRAVENOUS; SUBCUTANEOUS at 05:20

## 2019-04-03 RX ADMIN — CLOZAPINE 12.5 MILLIGRAM(S): 150 TABLET, ORALLY DISINTEGRATING ORAL at 16:50

## 2019-04-03 RX ADMIN — CLOZAPINE 12.5 MILLIGRAM(S): 150 TABLET, ORALLY DISINTEGRATING ORAL at 05:20

## 2019-04-03 RX ADMIN — DIVALPROEX SODIUM 500 MILLIGRAM(S): 500 TABLET, DELAYED RELEASE ORAL at 08:42

## 2019-04-03 NOTE — PROGRESS NOTE BEHAVIORAL HEALTH - NSBHCONSULTFOLLOWDETAILS_PSY_A_CORE FT
Will need psychiatric follow up to determine disposition.  Will likely require inpatient psychiatric admission.

## 2019-04-03 NOTE — PROGRESS NOTE ADULT - SUBJECTIVE AND OBJECTIVE BOX
Patient seen and examined; chart reviewed.  Spoke to pt at bedside with Kazakh liason.  Denies dysphagia or odynophagia or dyspepsia.  No fever.  No overt bleeding.   Daughter had declined consent for EGD yesterday.  Subsequently fed and tolerated regular diet without incident.  Alleges to have had several bowel movements yesterday.  Agitation yesterday controlled with medications.  Code [place].    Slept well overnight.  No current GI complaints.        PAST MEDICAL & SURGICAL HISTORY:  Poor historian:   bipolar disorder  Schizophrenia   GERD  Chronic hyponatremia   HTN      ROS:  No Heartburn, regurgitation, dysphagia, odynophagia.  No dyspepsia  No abdominal pain.    No Nausea, vomiting.  No Bleeding.  No hematemesis.   No diarrhea.    No hematochezia.  No weight loss, anorexia.  No edema.      MEDICATIONS  (STANDING):  cloZAPine 12.5 milliGRAM(s) Oral two times a day  dextrose 5% + sodium chloride 0.45%. 1000 milliLiter(s) (100 mL/Hr) IV Continuous <Continuous>  diVALproex  milliGRAM(s) Oral daily  heparin  Injectable 5000 Unit(s) SubCutaneous every 8 hours  pantoprazole  Injectable 40 milliGRAM(s) IV Push two times a day    MEDICATIONS  (PRN):  haloperidol    Injectable 5 milliGRAM(s) IntraMuscular every 8 hours PRN Agitation  LORazepam   Injectable 2 milliGRAM(s) IntraMuscular every 4 hours PRN Agitation      Allergies  No Known Allergies    Vital Signs Last 24 Hrs  T(C): 36.7 (2019 08:01), Max: 36.8 (2019 15:38)  T(F): 98 (2019 08:01), Max: 98.2 (2019 15:38)  HR: 86 (2019 08:01) (67 - 89)  BP: 164/89 (2019 08:01) (127/64 - 164/89)  BP(mean): --  RR: 20 (2019 08:01) (20 - 21)  SpO2: 98% (2019 08:01) (96% - 99%)    PHYSICAL EXAM:    GENERAL: NAD, well-groomed, well-developed;  NAD.    HEAD:  Atraumatic, Normocephalic  EYES: EOMI, PERRLA, conjunctiva and sclera clear  ENMT: No tonsillar erythema, exudates, or enlargement; Moist mucous membranes, Good dentition, No lesions  NECK: Supple, No JVD, Normal thyroid  CHEST/LUNG: Clear to percussion bilaterally; No rales, rhonchi, wheezing, or rubs  HEART: Regular rate and rhythm; No murmurs, rubs, or gallops  ABDOMEN: Soft, Nontender, Nondistended; Bowel sounds present;  No HSM.  No MTR.    EXTREMITIES:  2+ Peripheral Pulses, No clubbing, cyanosis, or edema  LYMPH: No lymphadenopathy noted  SKIN: No rashes or lesions      LABS:                        12.7   10.9  )-----------( 265      ( 2019 07:39 )             35.5     04-02    130<L>  |  95<L>  |  3.0<L>  ----------------------------<  98  4.0   |  22.0  |  0.67    Ca    8.8      2019 07:41    TPro  7.2  /  Alb  4.4  /  TBili  0.6  /  DBili  x   /  AST  42<H>  /  ALT  39  /  AlkPhos  54  04-01       Urinalysis Basic - ( 2019 20:47 )    Color: Yellow / Appearance: Clear / S.010 / pH: x  Gluc: x / Ketone: Negative  / Bili: Negative / Urobili: Negative mg/dL   Blood: x / Protein: 30 mg/dL / Nitrite: Negative   Leuk Esterase: Negative / RBC: 0-2 /HPF / WBC 0-2   Sq Epi: x / Non Sq Epi: Occasional / Bacteria: Occasional      EKG:  NSR.      RADIOLOGY & ADDITIONAL STUDIES:  CXR:  NAPD

## 2019-04-03 NOTE — PROGRESS NOTE ADULT - SUBJECTIVE AND OBJECTIVE BOX
seen for psychosis    seen with . having lunch  incoherent speech at times,  pressured and tangential speech  ros unable to obtain.    MEDICATIONS  (STANDING):  cloZAPine 12.5 milliGRAM(s) Oral two times a day  diVALproex  milliGRAM(s) Oral daily  heparin  Injectable 5000 Unit(s) SubCutaneous every 8 hours    MEDICATIONS  (PRN):  haloperidol    Injectable 5 milliGRAM(s) IntraMuscular every 8 hours PRN Agitation  LORazepam   Injectable 2 milliGRAM(s) IntraMuscular every 4 hours PRN Agitation      Allergies    No Known Allergies      Vital Signs Last 24 Hrs  T(C): 36.7 (2019 08:01), Max: 36.8 (2019 15:38)  T(F): 98 (2019 08:01), Max: 98.2 (2019 15:38)  HR: 86 (2019 08:01) (67 - 89)  BP: 164/89 (2019 08:01) (127/64 - 164/89)  BP(mean): --  RR: 20 (2019 08:01) (20 - 21)  SpO2: 98% (2019 08:01) (96% - 99%)    PHYSICAL EXAM:    GENERAL: NAD  CHEST/LUNG: Clear to percussion bilaterally  HEART: Regular rate and rhythm; S1 S2;  ABDOMEN: Soft,  Bowel sounds present  EXTREMITIES:  no edema   PSYCH: calm but incoherent speech    LABS:                        12.7   10.9  )-----------( 265      ( 2019 07:39 )             35.5     04-03    129<L>  |  95<L>  |  6.0<L>  ----------------------------<  117<H>  4.1   |  24.0  |  0.72    Ca    9.1      2019 07:39    TPro  7.2  /  Alb  4.4  /  TBili  0.6  /  DBili  x   /  AST  42<H>  /  ALT  39  /  AlkPhos  54  04-01      Urinalysis Basic - ( 2019 20:47 )    Color: Yellow / Appearance: Clear / S.010 / pH: x  Gluc: x / Ketone: Negative  / Bili: Negative / Urobili: Negative mg/dL   Blood: x / Protein: 30 mg/dL / Nitrite: Negative   Leuk Esterase: Negative / RBC: 0-2 /HPF / WBC 0-2   Sq Epi: x / Non Sq Epi: Occasional / Bacteria: Occasional        CAPILLARY BLOOD GLUCOSE            RADIOLOGY & ADDITIONAL TESTS:

## 2019-04-03 NOTE — PROGRESS NOTE ADULT - ASSESSMENT
58 yo M w/ hx bipolar disorder, schizophrenia, HTN, GERD, chronic hyponatremia was brought to the ED via EMS for psychosis. As per daughter at bed side, patient has not been taking his medications for the last 2 weeks and he has been very aggressive and combative. Daughter said he has not been sleeping nor eating. She went to patient's house today and found Ajax and water in the fridge; when she asked her father why he has Ajax in the fridge he replied " that is the only thing that has been keeping me going" so she called EMS to bring him to the hospital.     Ingestion of bleach--possible      EGD refused by daughter.     gi cleared for diet and ppi    Psychosis     prn haldol/ativan.      Psych following     restart divalproex 500mg daily and clozapine 12.5mg BID  ---HOME MEDS----DIVALPROEX 500MG QAM AND CLOZAPINE 25MG BID    Chronic hyponatremia      stable, dc ivf     add nacl tabs       HTN   BP at goal now  Will give Hydralazine 5mg IV PRN as needed   Monitor BP     Supportive   DVT prophylaxis: lovenox   Diet: NPO; advance diet as needed.     updated daughter over the phone [phone]  (luis fernando)---states patient threw all identifying documents away.

## 2019-04-03 NOTE — PROGRESS NOTE ADULT - ASSESSMENT
Low suspicion for true caustic ingestion.  No evidence for any mouth burns.  No current GI symptoms.  Attempted to contact daughter x2>  Left messages on Voicemail for return of call.    No current plans to pursue EGD.  Clinically stable from GI POV for discharge.  Continue with regular diet.

## 2019-04-03 NOTE — ED PROVIDER NOTE - NS HIV RISK FACTOR YES
Avoid NSAID pain medications such as advil, aleve, motrin, ibuprofen, naprosyn, meloxicam, diclofenac, mobic.     Increase Furosemdie to 80 mg daily           
Declined

## 2019-04-03 NOTE — PROGRESS NOTE BEHAVIORAL HEALTH - NSBHCONSULTMEDS_PSY_A_CORE FT
Stop Clozaril. Increase Depakote to 500 mg in am and 1000 mg in HS.  Patient due for Haldol decanoate 200 mg X 1 dose (due yesterday. Will order today)

## 2019-04-04 LAB
ANION GAP SERPL CALC-SCNC: 11 MMOL/L — SIGNIFICANT CHANGE UP (ref 5–17)
BUN SERPL-MCNC: 11 MG/DL — SIGNIFICANT CHANGE UP (ref 8–20)
CALCIUM SERPL-MCNC: 9.4 MG/DL — SIGNIFICANT CHANGE UP (ref 8.6–10.2)
CHLORIDE SERPL-SCNC: 95 MMOL/L — LOW (ref 98–107)
CO2 SERPL-SCNC: 26 MMOL/L — SIGNIFICANT CHANGE UP (ref 22–29)
CREAT SERPL-MCNC: 0.66 MG/DL — SIGNIFICANT CHANGE UP (ref 0.5–1.3)
GLUCOSE SERPL-MCNC: 106 MG/DL — SIGNIFICANT CHANGE UP (ref 70–115)
HCT VFR BLD CALC: 34.2 % — LOW (ref 42–52)
HGB BLD-MCNC: 12.3 G/DL — LOW (ref 14–18)
MCHC RBC-ENTMCNC: 29.1 PG — SIGNIFICANT CHANGE UP (ref 27–31)
MCHC RBC-ENTMCNC: 36 G/DL — SIGNIFICANT CHANGE UP (ref 32–36)
MCV RBC AUTO: 81 FL — SIGNIFICANT CHANGE UP (ref 80–94)
PLATELET # BLD AUTO: 301 K/UL — SIGNIFICANT CHANGE UP (ref 150–400)
POTASSIUM SERPL-MCNC: 4.3 MMOL/L — SIGNIFICANT CHANGE UP (ref 3.5–5.3)
POTASSIUM SERPL-SCNC: 4.3 MMOL/L — SIGNIFICANT CHANGE UP (ref 3.5–5.3)
RBC # BLD: 4.22 M/UL — LOW (ref 4.6–6.2)
RBC # FLD: 13.5 % — SIGNIFICANT CHANGE UP (ref 11–15.6)
SODIUM SERPL-SCNC: 132 MMOL/L — LOW (ref 135–145)
WBC # BLD: 10.2 K/UL — SIGNIFICANT CHANGE UP (ref 4.8–10.8)
WBC # FLD AUTO: 10.2 K/UL — SIGNIFICANT CHANGE UP (ref 4.8–10.8)

## 2019-04-04 PROCEDURE — 99232 SBSQ HOSP IP/OBS MODERATE 35: CPT

## 2019-04-04 RX ORDER — HYDRALAZINE HCL 50 MG
5 TABLET ORAL ONCE
Qty: 0 | Refills: 0 | Status: COMPLETED | OUTPATIENT
Start: 2019-04-04 | End: 2019-04-04

## 2019-04-04 RX ADMIN — SODIUM CHLORIDE 1 GRAM(S): 9 INJECTION INTRAMUSCULAR; INTRAVENOUS; SUBCUTANEOUS at 21:42

## 2019-04-04 RX ADMIN — DIVALPROEX SODIUM 500 MILLIGRAM(S): 500 TABLET, DELAYED RELEASE ORAL at 11:48

## 2019-04-04 RX ADMIN — HEPARIN SODIUM 5000 UNIT(S): 5000 INJECTION INTRAVENOUS; SUBCUTANEOUS at 13:59

## 2019-04-04 RX ADMIN — Medication 5 MILLIGRAM(S): at 18:23

## 2019-04-04 RX ADMIN — HEPARIN SODIUM 5000 UNIT(S): 5000 INJECTION INTRAVENOUS; SUBCUTANEOUS at 21:43

## 2019-04-04 RX ADMIN — SODIUM CHLORIDE 1 GRAM(S): 9 INJECTION INTRAMUSCULAR; INTRAVENOUS; SUBCUTANEOUS at 06:49

## 2019-04-04 RX ADMIN — PANTOPRAZOLE SODIUM 40 MILLIGRAM(S): 20 TABLET, DELAYED RELEASE ORAL at 06:29

## 2019-04-04 RX ADMIN — SODIUM CHLORIDE 1 GRAM(S): 9 INJECTION INTRAMUSCULAR; INTRAVENOUS; SUBCUTANEOUS at 13:59

## 2019-04-04 RX ADMIN — DIVALPROEX SODIUM 1000 MILLIGRAM(S): 500 TABLET, DELAYED RELEASE ORAL at 22:17

## 2019-04-04 RX ADMIN — HEPARIN SODIUM 5000 UNIT(S): 5000 INJECTION INTRAVENOUS; SUBCUTANEOUS at 06:29

## 2019-04-04 NOTE — PROGRESS NOTE BEHAVIORAL HEALTH - SUMMARY
Patient is a 64 y/o male, domiciled alone, non caregiver, unemployed, with PPH of Bipolar disorder with multiple prior psychiatric hospitalizations; no known suicide attempts no active substance abuse or known history of complicated withdrawal; PMHx of GERD and recurrent hyponatremia.  Patient has h/o recurrent hypontremia.  Patient was brought to ER for psychosis.  Patient has been non compliant with medications. He is paranoid and has been hearing voices. He feels that his neighbors want to harm him and its too dangerous to live in his apartment.  He has been exhibiting more erratic and disorganized behavior. It was thought he may have ingested ajax, and smelled of ajax on intial presentations, however he did not have any complications that would be expected if he had ingested.   He has not taken medications for two weeks.  Patient requires inpatient hospitalization for safety and stabilization
Patient is a 64 y/o male, domiciled alone, non caregiver, unemployed, with PPH of Bipolar disorder with multiple prior psychiatric hospitalizations; no known suicide attempts no active substance abuse or known history of complicated withdrawal; PMHx of GERD and recurrent hyponatremia.  Patient has h/o recurrent hypontremia.  Patient was brought to ER for psychosis.  Patient has been non compliant with medications. He is paranoid and has been hearing voices.  He has not taken medications for two weeks.  Reportedly swallowed Ajax, to help with stomach.   Denies any active S/H I/I/P.   Awaiting collateral from daughter and ACT team.

## 2019-04-04 NOTE — PROGRESS NOTE BEHAVIORAL HEALTH - NSBHFUPINTERVALHXFT_PSY_A_CORE
Patient continues to deny he took Ajax.  He reports he was taking some sugar for his stomach.  Continues to remain paranoid about neighbors. He reports its unsafe to be in his apartment because neighbors want to harm him.  He doesn't feel he needs medications.  Spoke with ACT team. ACT visited patient yesterday. They report patient did not want to open door and yelled in a made up language. He is delusional at baseline with limited support but seemed worse.  He was last hospitalized at [facility] for 2-3 months. He had been started on Clozaril but patient was not compliant with blood draws so this was discontinued and haldol decanoate was increased.  Patient was due for shot yesterday. Confirmed patient's medication regimen with ACT team.
Patient was irritable, and he did not want to talk with writer. He did accept Haldol decanoate 200 mg IM X 1 dose yesterday but refused Depakote 1000 mg in HS. Stated he did not need to take medications, remains paranoid about neighbors that want to harm him.  Would not engage with writer.

## 2019-04-04 NOTE — PROGRESS NOTE ADULT - SUBJECTIVE AND OBJECTIVE BOX
Kemi Interpretor#[id number]    CHIEF COMPLAINT/INTERVAL HISTORY:  Pt. seen and evaluated for psychosis.  Pt. is eating breakfast in no distress.  Pressured speech at times during the interview.  Was singing at the end of our interaction.      REVIEW OF SYSTEMS:  No fever, CP, or SOB.      Vital Signs Last 24 Hrs  T(C): 36.6 (04 Apr 2019 05:10), Max: 36.6 (04 Apr 2019 01:00)  T(F): 97.9 (04 Apr 2019 05:10), Max: 97.9 (04 Apr 2019 05:10)  HR: 80 (04 Apr 2019 05:10) (80 - 91)  BP: 119/58 (04 Apr 2019 05:10) (119/58 - 167/84)  BP(mean): --  RR: 18 (04 Apr 2019 05:10) (18 - 19)  SpO2: 96% (04 Apr 2019 05:10) (96% - 100%)    PHYSICAL EXAM:  GENERAL: NAD  HEENT: EOMI, hearing normal, conjunctiva and sclera clear  Chest: CTA bilaterally, no wheezing  CV: S1S2, RRR,   GI: soft, +BS, NT/ND  Musculoskeletal: no edema  Psychiatric: affect nL, mood nL  Skin: warm and dry    LABS:                        12.7   10.9  )-----------( 265      ( 03 Apr 2019 07:39 )             35.5     04-04    132<L>  |  95<L>  |  11.0  ----------------------------<  106  4.3   |  26.0  |  0.66    Ca    9.4      04 Apr 2019 07:35      Assessment and Plan:  -Possible ingestion of bleach:  Low suspicion for ingestion of bleach.  Cleared by GI.   Tolerating regular diet.  Continue Protonix.  -Psychosis:  Continue  Depakote 500mg PO daily, Depakote ER 1000mg PO QHS, HaldolPRN, and Ativan PRN.  Psychiatry f/u  -Chronic hyponatremia:  continue sodium chloride 1gm PO TID  -HTN:  Currently off antihypertensives.  Will monitor BP.    -VTE ppx: heparin 5000 units SQ Q8h

## 2019-04-04 NOTE — PROGRESS NOTE BEHAVIORAL HEALTH - NSBHCHARTREVIEWVS_PSY_A_CORE FT
Vital Signs Last 24 Hrs  T(C): 36.1 (03 Apr 2019 16:14), Max: 36.7 (03 Apr 2019 05:37)  T(F): 97 (03 Apr 2019 16:14), Max: 98.1 (03 Apr 2019 05:37)  HR: 91 (03 Apr 2019 16:14) (67 - 91)  BP: 167/84 (03 Apr 2019 16:14) (127/64 - 167/84)  BP(mean): --  RR: 19 (03 Apr 2019 16:14) (19 - 20)  SpO2: 100% (03 Apr 2019 16:14) (96% - 100%)
Vital Signs Last 24 Hrs  T(C): 36.5 (04 Apr 2019 11:35), Max: 36.6 (04 Apr 2019 01:00)  T(F): 97.7 (04 Apr 2019 11:35), Max: 97.9 (04 Apr 2019 05:10)  HR: 79 (04 Apr 2019 11:35) (79 - 91)  BP: 148/74 (04 Apr 2019 11:35) (119/58 - 167/84)  BP(mean): --  RR: 19 (04 Apr 2019 11:35) (18 - 19)  SpO2: 96% (04 Apr 2019 11:35) (96% - 100%)

## 2019-04-04 NOTE — PROGRESS NOTE BEHAVIORAL HEALTH - NSBHCHARTREVIEWLAB_PSY_A_CORE FT
12.7   10.9  )-----------( 265      ( 2019 07:39 )             35.5     04-03    129<L>  |  95<L>  |  6.0<L>  ----------------------------<  117<H>  4.1   |  24.0  |  0.72    Ca    9.1      2019 07:39    TPro  7.2  /  Alb  4.4  /  TBili  0.6  /  DBili  x   /  AST  42<H>  /  ALT  39  /  AlkPhos  54  04-01    LIVER FUNCTIONS - ( 2019 20:28 )  Alb: 4.4 g/dL / Pro: 7.2 g/dL / ALK PHOS: 54 U/L / ALT: 39 U/L / AST: 42 U/L / GGT: x             Urinalysis Basic - ( 2019 20:47 )    Color: Yellow / Appearance: Clear / S.010 / pH: x  Gluc: x / Ketone: Negative  / Bili: Negative / Urobili: Negative mg/dL   Blood: x / Protein: 30 mg/dL / Nitrite: Negative   Leuk Esterase: Negative / RBC: 0-2 /HPF / WBC 0-2   Sq Epi: x / Non Sq Epi: Occasional / Bacteria: Occasional
12.3   10.2  )-----------( 301      ( 04 Apr 2019 11:00 )             34.2     04-04    132<L>  |  95<L>  |  11.0  ----------------------------<  106  4.3   |  26.0  |  0.66    Ca    9.4      04 Apr 2019 07:35

## 2019-04-04 NOTE — PROGRESS NOTE BEHAVIORAL HEALTH - NSBHCONSULTMEDS_PSY_A_CORE FT
Continue  Depakote to 500 mg in am and 1000 mg in HS.  Haldol decanoate 200 mg X 1 dose (given on 4/3)

## 2019-04-05 ENCOUNTER — TRANSCRIPTION ENCOUNTER (OUTPATIENT)
Age: 64
End: 2019-04-05

## 2019-04-05 ENCOUNTER — INPATIENT (INPATIENT)
Facility: HOSPITAL | Age: 64
LOS: 10 days | Discharge: HOME | End: 2019-04-16
Attending: PSYCHIATRY & NEUROLOGY | Admitting: PSYCHIATRY & NEUROLOGY
Payer: MEDICARE

## 2019-04-05 VITALS
DIASTOLIC BLOOD PRESSURE: 84 MMHG | HEART RATE: 100 BPM | SYSTOLIC BLOOD PRESSURE: 168 MMHG | OXYGEN SATURATION: 98 % | RESPIRATION RATE: 20 BRPM | TEMPERATURE: 99 F

## 2019-04-05 VITALS — WEIGHT: 315 LBS | HEIGHT: 65 IN | TEMPERATURE: 98 F

## 2019-04-05 DIAGNOSIS — F20.0 PARANOID SCHIZOPHRENIA: ICD-10-CM

## 2019-04-05 LAB
ANION GAP SERPL CALC-SCNC: 11 MMOL/L — SIGNIFICANT CHANGE UP (ref 5–17)
BUN SERPL-MCNC: 10 MG/DL — SIGNIFICANT CHANGE UP (ref 8–20)
CALCIUM SERPL-MCNC: 9 MG/DL — SIGNIFICANT CHANGE UP (ref 8.6–10.2)
CHLORIDE SERPL-SCNC: 92 MMOL/L — LOW (ref 98–107)
CO2 SERPL-SCNC: 27 MMOL/L — SIGNIFICANT CHANGE UP (ref 22–29)
CREAT SERPL-MCNC: 0.69 MG/DL — SIGNIFICANT CHANGE UP (ref 0.5–1.3)
GLUCOSE SERPL-MCNC: 109 MG/DL — SIGNIFICANT CHANGE UP (ref 70–115)
HCT VFR BLD CALC: 33.4 % — LOW (ref 42–52)
HGB BLD-MCNC: 11.7 G/DL — LOW (ref 14–18)
MAGNESIUM SERPL-MCNC: 1.7 MG/DL — SIGNIFICANT CHANGE UP (ref 1.6–2.6)
MCHC RBC-ENTMCNC: 28.5 PG — SIGNIFICANT CHANGE UP (ref 27–31)
MCHC RBC-ENTMCNC: 35 G/DL — SIGNIFICANT CHANGE UP (ref 32–36)
MCV RBC AUTO: 81.5 FL — SIGNIFICANT CHANGE UP (ref 80–94)
PLATELET # BLD AUTO: 290 K/UL — SIGNIFICANT CHANGE UP (ref 150–400)
POTASSIUM SERPL-MCNC: 4.5 MMOL/L — SIGNIFICANT CHANGE UP (ref 3.5–5.3)
POTASSIUM SERPL-SCNC: 4.5 MMOL/L — SIGNIFICANT CHANGE UP (ref 3.5–5.3)
RBC # BLD: 4.1 M/UL — LOW (ref 4.6–6.2)
RBC # FLD: 13.4 % — SIGNIFICANT CHANGE UP (ref 11–15.6)
SODIUM SERPL-SCNC: 130 MMOL/L — LOW (ref 135–145)
WBC # BLD: 7.4 K/UL — SIGNIFICANT CHANGE UP (ref 4.8–10.8)
WBC # FLD AUTO: 7.4 K/UL — SIGNIFICANT CHANGE UP (ref 4.8–10.8)

## 2019-04-05 PROCEDURE — 80307 DRUG TEST PRSMV CHEM ANLYZR: CPT

## 2019-04-05 PROCEDURE — 80053 COMPREHEN METABOLIC PANEL: CPT

## 2019-04-05 PROCEDURE — 36415 COLL VENOUS BLD VENIPUNCTURE: CPT

## 2019-04-05 PROCEDURE — 71045 X-RAY EXAM CHEST 1 VIEW: CPT

## 2019-04-05 PROCEDURE — T1013: CPT

## 2019-04-05 PROCEDURE — 80048 BASIC METABOLIC PNL TOTAL CA: CPT

## 2019-04-05 PROCEDURE — 84443 ASSAY THYROID STIM HORMONE: CPT

## 2019-04-05 PROCEDURE — 86803 HEPATITIS C AB TEST: CPT

## 2019-04-05 PROCEDURE — 81001 URINALYSIS AUTO W/SCOPE: CPT

## 2019-04-05 PROCEDURE — 99285 EMERGENCY DEPT VISIT HI MDM: CPT | Mod: 25

## 2019-04-05 PROCEDURE — 83690 ASSAY OF LIPASE: CPT

## 2019-04-05 PROCEDURE — 83735 ASSAY OF MAGNESIUM: CPT

## 2019-04-05 PROCEDURE — 99232 SBSQ HOSP IP/OBS MODERATE 35: CPT

## 2019-04-05 PROCEDURE — 85027 COMPLETE CBC AUTOMATED: CPT

## 2019-04-05 PROCEDURE — 96375 TX/PRO/DX INJ NEW DRUG ADDON: CPT

## 2019-04-05 PROCEDURE — 99222 1ST HOSP IP/OBS MODERATE 55: CPT

## 2019-04-05 PROCEDURE — 99239 HOSP IP/OBS DSCHRG MGMT >30: CPT

## 2019-04-05 PROCEDURE — 93005 ELECTROCARDIOGRAM TRACING: CPT

## 2019-04-05 PROCEDURE — 96374 THER/PROPH/DIAG INJ IV PUSH: CPT

## 2019-04-05 RX ORDER — DIVALPROEX SODIUM 500 MG/1
500 TABLET, DELAYED RELEASE ORAL DAILY
Qty: 0 | Refills: 0 | Status: DISCONTINUED | OUTPATIENT
Start: 2019-04-05 | End: 2019-04-16

## 2019-04-05 RX ORDER — SODIUM CHLORIDE 9 MG/ML
1 INJECTION INTRAMUSCULAR; INTRAVENOUS; SUBCUTANEOUS DAILY
Qty: 0 | Refills: 0 | Status: DISCONTINUED | OUTPATIENT
Start: 2019-04-05 | End: 2019-04-08

## 2019-04-05 RX ORDER — LISINOPRIL 2.5 MG/1
5 TABLET ORAL DAILY
Qty: 0 | Refills: 0 | Status: DISCONTINUED | OUTPATIENT
Start: 2019-04-05 | End: 2019-04-16

## 2019-04-05 RX ORDER — PANTOPRAZOLE SODIUM 20 MG/1
40 TABLET, DELAYED RELEASE ORAL
Qty: 0 | Refills: 0 | Status: DISCONTINUED | OUTPATIENT
Start: 2019-04-05 | End: 2019-04-16

## 2019-04-05 RX ORDER — HALOPERIDOL DECANOATE 100 MG/ML
5 INJECTION INTRAMUSCULAR
Qty: 0 | Refills: 0 | Status: DISCONTINUED | OUTPATIENT
Start: 2019-04-05 | End: 2019-04-10

## 2019-04-05 RX ORDER — HALOPERIDOL DECANOATE 100 MG/ML
5 INJECTION INTRAMUSCULAR
Qty: 0 | Refills: 0 | COMMUNITY
Start: 2019-04-05

## 2019-04-05 RX ORDER — DIVALPROEX SODIUM 500 MG/1
2 TABLET, DELAYED RELEASE ORAL
Qty: 0 | Refills: 0 | COMMUNITY
Start: 2019-04-05

## 2019-04-05 RX ORDER — DIVALPROEX SODIUM 500 MG/1
1000 TABLET, DELAYED RELEASE ORAL AT BEDTIME
Qty: 0 | Refills: 0 | Status: DISCONTINUED | OUTPATIENT
Start: 2019-04-05 | End: 2019-04-16

## 2019-04-05 RX ORDER — DIVALPROEX SODIUM 500 MG/1
1 TABLET, DELAYED RELEASE ORAL
Qty: 0 | Refills: 0 | COMMUNITY
Start: 2019-04-05

## 2019-04-05 RX ORDER — AMLODIPINE BESYLATE 2.5 MG/1
10 TABLET ORAL DAILY
Qty: 0 | Refills: 0 | Status: DISCONTINUED | OUTPATIENT
Start: 2019-04-05 | End: 2019-04-16

## 2019-04-05 RX ORDER — HALOPERIDOL DECANOATE 100 MG/ML
5 INJECTION INTRAMUSCULAR ONCE
Qty: 0 | Refills: 0 | Status: DISCONTINUED | OUTPATIENT
Start: 2019-04-05 | End: 2019-04-16

## 2019-04-05 RX ORDER — MAGNESIUM SULFATE 500 MG/ML
2 VIAL (ML) INJECTION ONCE
Qty: 0 | Refills: 0 | Status: COMPLETED | OUTPATIENT
Start: 2019-04-05 | End: 2019-04-05

## 2019-04-05 RX ORDER — PANTOPRAZOLE SODIUM 20 MG/1
1 TABLET, DELAYED RELEASE ORAL
Qty: 0 | Refills: 0 | COMMUNITY
Start: 2019-04-05

## 2019-04-05 RX ADMIN — HALOPERIDOL DECANOATE 5 MILLIGRAM(S): 100 INJECTION INTRAMUSCULAR at 18:33

## 2019-04-05 RX ADMIN — SODIUM CHLORIDE 1 GRAM(S): 9 INJECTION INTRAMUSCULAR; INTRAVENOUS; SUBCUTANEOUS at 05:26

## 2019-04-05 RX ADMIN — HEPARIN SODIUM 5000 UNIT(S): 5000 INJECTION INTRAVENOUS; SUBCUTANEOUS at 13:21

## 2019-04-05 RX ADMIN — Medication 50 GRAM(S): at 13:21

## 2019-04-05 RX ADMIN — DIVALPROEX SODIUM 500 MILLIGRAM(S): 500 TABLET, DELAYED RELEASE ORAL at 13:20

## 2019-04-05 RX ADMIN — PANTOPRAZOLE SODIUM 40 MILLIGRAM(S): 20 TABLET, DELAYED RELEASE ORAL at 05:25

## 2019-04-05 RX ADMIN — HEPARIN SODIUM 5000 UNIT(S): 5000 INJECTION INTRAVENOUS; SUBCUTANEOUS at 05:25

## 2019-04-05 RX ADMIN — Medication 2 MILLIGRAM(S): at 18:28

## 2019-04-05 RX ADMIN — SODIUM CHLORIDE 1 GRAM(S): 9 INJECTION INTRAMUSCULAR; INTRAVENOUS; SUBCUTANEOUS at 13:20

## 2019-04-05 NOTE — DISCHARGE NOTE PROVIDER - HOSPITAL COURSE
is a 64 y/o male with PMH of bipolar disorder, schizophrenia, HTN, GERD, chronic hyponatremia was brought to the ED via EMS for psychosis. As per daughter at bed side, patient has not been taking his medications for the last 2 weeks and he has been very aggressive and combative. Daughter said he has not been sleeping nor eating. She went to patient's house today and found Ajax and water in the fridge; when she asked her father why he has Ajax in the fridge he replied " that is the only thing that has been keeping me going" so she called EMS to bring him to the hospital. As per daughter, patient was recently seen in [place] for similar issue; he was hospitalized at the time for 2 months. She said patient has been doing fine since discharge able to carry out his ADLs and taking all his medications as prescribed until 2 weeks ago. No recent infection reported, no nausea/vomiting, change in bowel/urinary habit reported by daughter.         His hospital course has been uneventful. He had no signs of acute bleeding or vomiting, and has been in a good state of heatlh, eating, drinking and ambulating. He does have signs of active psychosis including hearing voices and he can be discharged to an inpatient psychiatric facility. His [facility] forms have been signed.

## 2019-04-05 NOTE — PROGRESS NOTE ADULT - SUBJECTIVE AND OBJECTIVE BOX
chart reviewed attempted to interview however patients refused to participate screaming to get out of room Note by dr Sanders appreciated He remains paranoid and very hostile  Vital Signs Last 24 Hrs  T(C): 37 (05 Apr 2019 12:31), Max: 37 (04 Apr 2019 17:08)  T(F): 98.6 (05 Apr 2019 12:31), Max: 98.6 (04 Apr 2019 17:08)  HR: 79 (05 Apr 2019 12:31) (76 - 83)  BP: 156/74 (05 Apr 2019 12:31) (134/52 - 169/77)  BP(mean): --  RR: 20 (05 Apr 2019 12:31) (18 - 20)  SpO2: 97% (05 Apr 2019 04:54) (97% - 97%)  MEDICATIONS  (STANDING):  diVALproex  milliGRAM(s) Oral daily  diVALproex ER 1000 milliGRAM(s) Oral at bedtime  heparin  Injectable 5000 Unit(s) SubCutaneous every 8 hours  pantoprazole    Tablet 40 milliGRAM(s) Oral before breakfast  sodium chloride 1 Gram(s) Oral three times a day                        11.7   7.4   )-----------( 290      ( 05 Apr 2019 08:07 )             33.4     04-05    130<L>  |  92<L>  |  10.0  ----------------------------<  109  4.5   |  27.0  |  0.69    Ca    9.0      05 Apr 2019 08:07  Mg     1.7     04-05

## 2019-04-05 NOTE — PROGRESS NOTE ADULT - ASSESSMENT
schizophrenia paranoid type acute exacerbation  agree with plan to @ to inpatient once medically cleared for transfer  medical certificate completed for admission to psych unit

## 2019-04-05 NOTE — PROGRESS NOTE ADULT - ASSESSMENT
Mr. Slade is a 60 yo M w/ hx bipolar disorder, schizophrenia, HTN, GERD, chronic hyponatremia was brought to the ED via EMS for psychosis. As per daughter at bed side, patient has not been taking his medications for the last 2 weeks and he has been very aggressive and combative. Daughter said he has not been sleeping nor eating. She went to patient's house today and found Ajax and water in the fridge; when she asked her father why he has Ajax in the fridge he replied " that is the only thing that has been keeping me going" so she called EMS to bring him to the hospital.     Ingestion of bleach--possible     -most likely did not ingest  -no GI symptoms/signs  -cont PPI for now    Psychosis     prn haldol/ativan.      Psych following     restart divalproex 500mg daily and clozapine 12.5mg BID  ---HOME MEDS----DIVALPROEX 500MG QAM AND CLOZAPINE 25MG BID    Chronic hyponatremia      -due to depakote most likely  -doesn't need NaCL tabs    HTN   -cont BP meds    Supportive   DVT prophylaxis: lovenox   Diet: NPO; advance diet as needed.     updated daughter over the phone [phone]  (luis fernando)---states patient threw all identifying documents away.

## 2019-04-05 NOTE — DISCHARGE NOTE PROVIDER - NSDCCPCAREPLAN_GEN_ALL_CORE_FT
PRINCIPAL DISCHARGE DIAGNOSIS  Diagnosis: Psychosis  Assessment and Plan of Treatment:       SECONDARY DISCHARGE DIAGNOSES  Diagnosis: Ingestion of bleach, undetermined intent, initial encounter  Assessment and Plan of Treatment:

## 2019-04-05 NOTE — DISCHARGE NOTE NURSING/CASE MANAGEMENT/SOCIAL WORK - NSDCDPATPORTLINK_GEN_ALL_CORE
You can access the [facility] Patient Portal, offered by [facility], by registering with the following website: [email or web address]

## 2019-04-05 NOTE — PROGRESS NOTE ADULT - SUBJECTIVE AND OBJECTIVE BOX
is admitted for psychosis. THere was suspicion of him possibly having ingested ajax, but has had no GI symptoms while here in the hospital, no acute bleeding, no vomiting. He is actively psychotic and hearing voices. He needs inpatient hospitalization.    Summary:   REVIEW OF SYSTEMS    General:	"I'm doing fine, no I didn't drink bleach, I'm not going to a psychiatric unit. My daughter is crazy."    Skin/Breast:  	  Ophthalmologic:  	  ENMT:	    Respiratory and Thorax:  	  Cardiovascular:	    Gastrointestinal:	    Genitourinary:	    Musculoskeletal:	    Neurological:	    Psychiatric:	    Hematology/Lymphatics:	    Endocrine:	    Allergic/Immunologic:	  Vital Signs Last 24 Hrs  T(C): 37 (05 Apr 2019 12:31), Max: 37 (04 Apr 2019 17:08)  T(F): 98.6 (05 Apr 2019 12:31), Max: 98.6 (04 Apr 2019 17:08)  HR: 79 (05 Apr 2019 12:31) (76 - 83)  BP: 156/74 (05 Apr 2019 12:31) (134/52 - 169/77)  BP(mean): --  RR: 20 (05 Apr 2019 12:31) (18 - 20)  SpO2: 97% (05 Apr 2019 04:54) (97% - 97%)  PHYSICAL EXAM:  GEN: NAD, comfortable, speaking full sentences but not making sense at times  HEENT: dry MM  CVS: S1S2 RRR  PULM: good breath sounds  ABD: soft, nontender, no rebound, no guarding  EXTREM: no edema  NEURO: neurologically intact  PSYCH: +psychosis  SKIN: warm, dry                        11.7   7.4   )-----------( 290      ( 05 Apr 2019 08:07 )             33.4     04-05    130<L>  |  92<L>  |  10.0  ----------------------------<  109  4.5   |  27.0  |  0.69    Ca    9.0      05 Apr 2019 08:07  Mg     1.7     04-05    Radiology:     MEDICATIONS  (STANDING):  diVALproex  milliGRAM(s) Oral daily  diVALproex ER 1000 milliGRAM(s) Oral at bedtime  heparin  Injectable 5000 Unit(s) SubCutaneous every 8 hours  magnesium sulfate  IVPB 2 Gram(s) IV Intermittent once  pantoprazole    Tablet 40 milliGRAM(s) Oral before breakfast  sodium chloride 1 Gram(s) Oral three times a day    MEDICATIONS  (PRN):  haloperidol    Injectable 5 milliGRAM(s) IntraMuscular every 8 hours PRN Agitation  LORazepam   Injectable 2 milliGRAM(s) IntraMuscular every 4 hours PRN Agitation

## 2019-04-06 DIAGNOSIS — I10 ESSENTIAL (PRIMARY) HYPERTENSION: ICD-10-CM

## 2019-04-06 DIAGNOSIS — F20.89 OTHER SCHIZOPHRENIA: ICD-10-CM

## 2019-04-06 DIAGNOSIS — F31.9 BIPOLAR DISORDER, UNSPECIFIED: ICD-10-CM

## 2019-04-06 DIAGNOSIS — E87.1 HYPO-OSMOLALITY AND HYPONATREMIA: ICD-10-CM

## 2019-04-06 LAB
ALBUMIN SERPL ELPH-MCNC: 4.2 G/DL — SIGNIFICANT CHANGE UP (ref 3.3–5)
ALP SERPL-CCNC: 46 U/L — SIGNIFICANT CHANGE UP (ref 40–120)
ALT FLD-CCNC: 25 U/L — SIGNIFICANT CHANGE UP (ref 4–41)
ANION GAP SERPL CALC-SCNC: 11 MMO/L — SIGNIFICANT CHANGE UP (ref 7–14)
ANION GAP SERPL CALC-SCNC: 8 MMO/L — SIGNIFICANT CHANGE UP (ref 7–14)
AST SERPL-CCNC: 23 U/L — SIGNIFICANT CHANGE UP (ref 4–40)
BASOPHILS # BLD AUTO: 0.06 K/UL — SIGNIFICANT CHANGE UP (ref 0–0.2)
BASOPHILS NFR BLD AUTO: 0.7 % — SIGNIFICANT CHANGE UP (ref 0–2)
BILIRUB SERPL-MCNC: 0.5 MG/DL — SIGNIFICANT CHANGE UP (ref 0.2–1.2)
BUN SERPL-MCNC: 9 MG/DL — SIGNIFICANT CHANGE UP (ref 7–23)
BUN SERPL-MCNC: 9 MG/DL — SIGNIFICANT CHANGE UP (ref 7–23)
CALCIUM SERPL-MCNC: 9.2 MG/DL — SIGNIFICANT CHANGE UP (ref 8.4–10.5)
CALCIUM SERPL-MCNC: 9.4 MG/DL — SIGNIFICANT CHANGE UP (ref 8.4–10.5)
CHLORIDE SERPL-SCNC: 86 MMOL/L — LOW (ref 98–107)
CHLORIDE SERPL-SCNC: 93 MMOL/L — LOW (ref 98–107)
CHOLEST SERPL-MCNC: 132 MG/DL — SIGNIFICANT CHANGE UP (ref 120–199)
CO2 SERPL-SCNC: 28 MMOL/L — SIGNIFICANT CHANGE UP (ref 22–31)
CO2 SERPL-SCNC: 29 MMOL/L — SIGNIFICANT CHANGE UP (ref 22–31)
CREAT SERPL-MCNC: 0.82 MG/DL — SIGNIFICANT CHANGE UP (ref 0.5–1.3)
CREAT SERPL-MCNC: 0.87 MG/DL — SIGNIFICANT CHANGE UP (ref 0.5–1.3)
EOSINOPHIL # BLD AUTO: 0.24 K/UL — SIGNIFICANT CHANGE UP (ref 0–0.5)
EOSINOPHIL NFR BLD AUTO: 2.9 % — SIGNIFICANT CHANGE UP (ref 0–6)
GLUCOSE SERPL-MCNC: 126 MG/DL — HIGH (ref 70–99)
GLUCOSE SERPL-MCNC: 91 MG/DL — SIGNIFICANT CHANGE UP (ref 70–99)
HBA1C BLD-MCNC: 5.6 % — SIGNIFICANT CHANGE UP (ref 4–5.6)
HCT VFR BLD CALC: 40.1 % — SIGNIFICANT CHANGE UP (ref 39–50)
HDLC SERPL-MCNC: 51 MG/DL — SIGNIFICANT CHANGE UP (ref 35–55)
HGB BLD-MCNC: 13.2 G/DL — SIGNIFICANT CHANGE UP (ref 13–17)
IMM GRANULOCYTES NFR BLD AUTO: 0.2 % — SIGNIFICANT CHANGE UP (ref 0–1.5)
LIPID PNL WITH DIRECT LDL SERPL: 76 MG/DL — SIGNIFICANT CHANGE UP
LYMPHOCYTES # BLD AUTO: 1.37 K/UL — SIGNIFICANT CHANGE UP (ref 1–3.3)
LYMPHOCYTES # BLD AUTO: 16.7 % — SIGNIFICANT CHANGE UP (ref 13–44)
MAGNESIUM SERPL-MCNC: 2.1 MG/DL — SIGNIFICANT CHANGE UP (ref 1.6–2.6)
MCHC RBC-ENTMCNC: 28.5 PG — SIGNIFICANT CHANGE UP (ref 27–34)
MCHC RBC-ENTMCNC: 32.9 % — SIGNIFICANT CHANGE UP (ref 32–36)
MCV RBC AUTO: 86.6 FL — SIGNIFICANT CHANGE UP (ref 80–100)
MONOCYTES # BLD AUTO: 0.76 K/UL — SIGNIFICANT CHANGE UP (ref 0–0.9)
MONOCYTES NFR BLD AUTO: 9.3 % — SIGNIFICANT CHANGE UP (ref 2–14)
NEUTROPHILS # BLD AUTO: 5.74 K/UL — SIGNIFICANT CHANGE UP (ref 1.8–7.4)
NEUTROPHILS NFR BLD AUTO: 70.2 % — SIGNIFICANT CHANGE UP (ref 43–77)
NRBC # FLD: 0 K/UL — SIGNIFICANT CHANGE UP (ref 0–0)
PLATELET # BLD AUTO: 219 K/UL — SIGNIFICANT CHANGE UP (ref 150–400)
PMV BLD: 10 FL — SIGNIFICANT CHANGE UP (ref 7–13)
POTASSIUM SERPL-MCNC: 4.5 MMOL/L — SIGNIFICANT CHANGE UP (ref 3.5–5.3)
POTASSIUM SERPL-MCNC: 4.7 MMOL/L — SIGNIFICANT CHANGE UP (ref 3.5–5.3)
POTASSIUM SERPL-SCNC: 4.5 MMOL/L — SIGNIFICANT CHANGE UP (ref 3.5–5.3)
POTASSIUM SERPL-SCNC: 4.7 MMOL/L — SIGNIFICANT CHANGE UP (ref 3.5–5.3)
PROT SERPL-MCNC: 7.3 G/DL — SIGNIFICANT CHANGE UP (ref 6–8.3)
RBC # BLD: 4.63 M/UL — SIGNIFICANT CHANGE UP (ref 4.2–5.8)
RBC # FLD: 13.2 % — SIGNIFICANT CHANGE UP (ref 10.3–14.5)
SODIUM SERPL-SCNC: 122 MMOL/L — LOW (ref 135–145)
SODIUM SERPL-SCNC: 133 MMOL/L — LOW (ref 135–145)
TRIGL SERPL-MCNC: 83 MG/DL — SIGNIFICANT CHANGE UP (ref 10–149)
TSH SERPL-MCNC: 1.72 UIU/ML — SIGNIFICANT CHANGE UP (ref 0.27–4.2)
VALPROATE SERPL-MCNC: 50.8 UG/ML — SIGNIFICANT CHANGE UP (ref 50–100)
WBC # BLD: 8.19 K/UL — SIGNIFICANT CHANGE UP (ref 3.8–10.5)
WBC # FLD AUTO: 8.19 K/UL — SIGNIFICANT CHANGE UP (ref 3.8–10.5)

## 2019-04-06 PROCEDURE — 99232 SBSQ HOSP IP/OBS MODERATE 35: CPT

## 2019-04-06 PROCEDURE — 99223 1ST HOSP IP/OBS HIGH 75: CPT

## 2019-04-06 RX ORDER — BENZOCAINE AND MENTHOL 5; 1 G/100ML; G/100ML
1 LIQUID ORAL
Qty: 0 | Refills: 0 | Status: DISCONTINUED | OUTPATIENT
Start: 2019-04-06 | End: 2019-04-16

## 2019-04-06 RX ORDER — ACETAMINOPHEN 500 MG
650 TABLET ORAL EVERY 6 HOURS
Qty: 0 | Refills: 0 | Status: DISCONTINUED | OUTPATIENT
Start: 2019-04-06 | End: 2019-04-16

## 2019-04-06 RX ADMIN — Medication 2 MILLIGRAM(S): at 20:42

## 2019-04-06 RX ADMIN — DIVALPROEX SODIUM 500 MILLIGRAM(S): 500 TABLET, DELAYED RELEASE ORAL at 09:01

## 2019-04-06 RX ADMIN — AMLODIPINE BESYLATE 10 MILLIGRAM(S): 2.5 TABLET ORAL at 09:01

## 2019-04-06 RX ADMIN — Medication 650 MILLIGRAM(S): at 19:19

## 2019-04-06 RX ADMIN — PANTOPRAZOLE SODIUM 40 MILLIGRAM(S): 20 TABLET, DELAYED RELEASE ORAL at 09:00

## 2019-04-06 RX ADMIN — DIVALPROEX SODIUM 1000 MILLIGRAM(S): 500 TABLET, DELAYED RELEASE ORAL at 20:40

## 2019-04-06 RX ADMIN — Medication 650 MILLIGRAM(S): at 18:37

## 2019-04-06 RX ADMIN — SODIUM CHLORIDE 1 GRAM(S): 9 INJECTION INTRAMUSCULAR; INTRAVENOUS; SUBCUTANEOUS at 09:00

## 2019-04-06 RX ADMIN — BENZOCAINE AND MENTHOL 1 LOZENGE: 5; 1 LIQUID ORAL at 20:39

## 2019-04-06 RX ADMIN — LISINOPRIL 5 MILLIGRAM(S): 2.5 TABLET ORAL at 09:00

## 2019-04-06 RX ADMIN — BENZOCAINE AND MENTHOL 1 LOZENGE: 5; 1 LIQUID ORAL at 11:15

## 2019-04-06 NOTE — CONSULT NOTE ADULT - ASSESSMENT
62 yo M with Bipolar disorder, schizophrenia, HTN, GERD, and chronic hyponatremia (presumably secondary to psychogenic polydipsia) who was transferred here from [facility].

## 2019-04-06 NOTE — CONSULT NOTE ADULT - SUBJECTIVE AND OBJECTIVE BOX
Markel Slade is a 63 year old gentlemen with Bipolar disorder, schizophrenia, HTN, GERD, and chronic hyponatremia (presumably secondary to psychogenic polydipsia) who was transferred here from [facility]. The patient seen and examined this morning, he does state that he occasionally feels palpitations and shortness of breath with deep inhalation. He also has occasional headaches and all these symptoms have been present for many years. Otherwise, no overnight events. He denies any chest pain, shortness of breath with exertion, fevers, chills, nausea or vomiting.    PAST MEDICAL & SURGICAL HISTORY:  Bipolar 1 disorder  Bipolar 1 disorder  Other schizophrenia  No significant past surgical history      Review of Systems:   CONSTITUTIONAL: No fever, weight loss, or fatigue  EYES: No eye pain, visual disturbances, or discharge  ENMT:  No difficulty hearing, tinnitus, vertigo; No sinus or throat pain  NECK: No pain or stiffness  RESPIRATORY: No cough, wheezing, chills or hemoptysis; No shortness of breath with exertion  CARDIOVASCULAR: No chest pain, dizziness, or leg swelling. Occasional palpitations  GASTROINTESTINAL: No abdominal or epigastric pain. No nausea, vomiting, or hematemesis; No diarrhea or constipation. No melena or hematochezia.  GENITOURINARY: No dysuria, frequency, hematuria, or incontinence  NEUROLOGICAL: no loss of strength, numbness, or tremors, mild headaches  SKIN: No itching, burning, rashes, or lesions   LYMPH NODES: No enlarged glands  ENDOCRINE: No heat or cold intolerance; No hair loss  MUSCULOSKELETAL: No joint pain or swelling; No muscle, back, or extremity pain  HEME/LYMPH: No easy bruising, or bleeding gums  ALLERY AND IMMUNOLOGIC: No hives or eczema    Allergies    No Known Allergies    Intolerances        Social History:   tobacco use+, denies drinking or drugs    FAMILY HISTORY:  No pertinent family history in first degree relatives      MEDICATIONS  (STANDING):  amLODIPine   Tablet 10 milliGRAM(s) Oral daily  diVALproex  milliGRAM(s) Oral daily  diVALproex ER 1000 milliGRAM(s) Oral at bedtime  haloperidol     Tablet 5 milliGRAM(s) Oral two times a day  lisinopril 5 milliGRAM(s) Oral daily  pantoprazole    Tablet 40 milliGRAM(s) Oral before breakfast  sodium chloride 1 Gram(s) Oral daily    MEDICATIONS  (PRN):  haloperidol    Injectable 5 milliGRAM(s) IntraMuscular once PRN agitation  LORazepam     Tablet 2 milliGRAM(s) Oral once PRN Agitation  LORazepam   Injectable 2 milliGRAM(s) IntraMuscular once PRN Agitation      Vital Signs Last 24 Hrs  T(C): 36.6 (06 Apr 2019 07:31), Max: 36.8 (05 Apr 2019 22:56)  T(F): 97.8 (06 Apr 2019 07:31), Max: 98.2 (05 Apr 2019 22:56)  HR: 79 (06 Apr 2019 07:31) (79 - 79)  BP: 149/79 (06 Apr 2019 07:31) (149/79 - 149/79)  BP(mean): --  RR: --  SpO2: --  CAPILLARY BLOOD GLUCOSE            PHYSICAL EXAM:  GENERAL: NAD, well-developed, comfortable, speaks in full sentences  HEAD:  Atraumatic, Normocephalic  EYES: EOMI, conjunctiva and sclera clear  NECK: Supple, No JVD  CHEST/LUNG: Clear to auscultation bilaterally; No wheeze  HEART: Regular rate and rhythm; No murmurs, rubs, or gallops  ABDOMEN: Soft, Nontender, Nondistended; Bowel sounds present  EXTREMITIES:  2+ Peripheral Pulses, No clubbing, cyanosis, or edema  NEUROLOGY: non-focal  SKIN: No rashes or lesions    LABS:                    EKG(personally reviewed):    RADIOLOGY & ADDITIONAL TESTS:    Imaging Personally Reviewed:    Consultant(s) Notes Reviewed:      Care Discussed with Consultants/Other Providers:

## 2019-04-06 NOTE — CONSULT NOTE ADULT - PROBLEM SELECTOR RECOMMENDATION 9
On fluid restrictions as cause is thought to be related psychogenic polydipsia  Resolved prior to coming into [facility]  BMP ordered today, will follow up

## 2019-04-07 LAB
ANION GAP SERPL CALC-SCNC: 9 MMO/L — SIGNIFICANT CHANGE UP (ref 7–14)
BUN SERPL-MCNC: 9 MG/DL — SIGNIFICANT CHANGE UP (ref 7–23)
CALCIUM SERPL-MCNC: 9.2 MG/DL — SIGNIFICANT CHANGE UP (ref 8.4–10.5)
CHLORIDE SERPL-SCNC: 89 MMOL/L — LOW (ref 98–107)
CO2 SERPL-SCNC: 27 MMOL/L — SIGNIFICANT CHANGE UP (ref 22–31)
CREAT SERPL-MCNC: 0.84 MG/DL — SIGNIFICANT CHANGE UP (ref 0.5–1.3)
GLUCOSE SERPL-MCNC: 83 MG/DL — SIGNIFICANT CHANGE UP (ref 70–99)
POTASSIUM SERPL-MCNC: 4.5 MMOL/L — SIGNIFICANT CHANGE UP (ref 3.5–5.3)
POTASSIUM SERPL-SCNC: 4.5 MMOL/L — SIGNIFICANT CHANGE UP (ref 3.5–5.3)
SODIUM SERPL-SCNC: 125 MMOL/L — LOW (ref 135–145)

## 2019-04-07 PROCEDURE — 99232 SBSQ HOSP IP/OBS MODERATE 35: CPT

## 2019-04-07 RX ADMIN — AMLODIPINE BESYLATE 10 MILLIGRAM(S): 2.5 TABLET ORAL at 09:42

## 2019-04-07 RX ADMIN — PANTOPRAZOLE SODIUM 40 MILLIGRAM(S): 20 TABLET, DELAYED RELEASE ORAL at 09:42

## 2019-04-07 RX ADMIN — HALOPERIDOL DECANOATE 5 MILLIGRAM(S): 100 INJECTION INTRAMUSCULAR at 21:05

## 2019-04-07 RX ADMIN — DIVALPROEX SODIUM 500 MILLIGRAM(S): 500 TABLET, DELAYED RELEASE ORAL at 09:41

## 2019-04-07 RX ADMIN — HALOPERIDOL DECANOATE 5 MILLIGRAM(S): 100 INJECTION INTRAMUSCULAR at 09:42

## 2019-04-07 RX ADMIN — DIVALPROEX SODIUM 1000 MILLIGRAM(S): 500 TABLET, DELAYED RELEASE ORAL at 21:05

## 2019-04-07 RX ADMIN — BENZOCAINE AND MENTHOL 1 LOZENGE: 5; 1 LIQUID ORAL at 18:43

## 2019-04-07 RX ADMIN — SODIUM CHLORIDE 1 GRAM(S): 9 INJECTION INTRAMUSCULAR; INTRAVENOUS; SUBCUTANEOUS at 09:42

## 2019-04-07 RX ADMIN — BENZOCAINE AND MENTHOL 1 LOZENGE: 5; 1 LIQUID ORAL at 11:49

## 2019-04-07 RX ADMIN — BENZOCAINE AND MENTHOL 1 LOZENGE: 5; 1 LIQUID ORAL at 16:48

## 2019-04-07 RX ADMIN — LISINOPRIL 5 MILLIGRAM(S): 2.5 TABLET ORAL at 09:42

## 2019-04-08 LAB
ANION GAP SERPL CALC-SCNC: 12 MMO/L — SIGNIFICANT CHANGE UP (ref 7–14)
BUN SERPL-MCNC: 8 MG/DL — SIGNIFICANT CHANGE UP (ref 7–23)
CALCIUM SERPL-MCNC: 9.2 MG/DL — SIGNIFICANT CHANGE UP (ref 8.4–10.5)
CHLORIDE SERPL-SCNC: 85 MMOL/L — LOW (ref 98–107)
CO2 SERPL-SCNC: 25 MMOL/L — SIGNIFICANT CHANGE UP (ref 22–31)
CREAT SERPL-MCNC: 0.8 MG/DL — SIGNIFICANT CHANGE UP (ref 0.5–1.3)
GLUCOSE SERPL-MCNC: 93 MG/DL — SIGNIFICANT CHANGE UP (ref 70–99)
OSMOLALITY UR: 124 MOSMO/KG — SIGNIFICANT CHANGE UP (ref 50–1200)
POTASSIUM SERPL-MCNC: 4.8 MMOL/L — SIGNIFICANT CHANGE UP (ref 3.5–5.3)
POTASSIUM SERPL-SCNC: 4.8 MMOL/L — SIGNIFICANT CHANGE UP (ref 3.5–5.3)
SODIUM SERPL-SCNC: 122 MMOL/L — LOW (ref 135–145)
SODIUM UR-SCNC: < 20 MMOL/L — SIGNIFICANT CHANGE UP

## 2019-04-08 PROCEDURE — 99233 SBSQ HOSP IP/OBS HIGH 50: CPT

## 2019-04-08 PROCEDURE — 99232 SBSQ HOSP IP/OBS MODERATE 35: CPT

## 2019-04-08 RX ORDER — SODIUM CHLORIDE 9 MG/ML
1 INJECTION INTRAMUSCULAR; INTRAVENOUS; SUBCUTANEOUS THREE TIMES A DAY
Qty: 0 | Refills: 0 | Status: DISCONTINUED | OUTPATIENT
Start: 2019-04-08 | End: 2019-04-16

## 2019-04-08 RX ADMIN — SODIUM CHLORIDE 1 GRAM(S): 9 INJECTION INTRAMUSCULAR; INTRAVENOUS; SUBCUTANEOUS at 20:20

## 2019-04-08 RX ADMIN — DIVALPROEX SODIUM 500 MILLIGRAM(S): 500 TABLET, DELAYED RELEASE ORAL at 08:19

## 2019-04-08 RX ADMIN — LISINOPRIL 5 MILLIGRAM(S): 2.5 TABLET ORAL at 08:19

## 2019-04-08 RX ADMIN — HALOPERIDOL DECANOATE 5 MILLIGRAM(S): 100 INJECTION INTRAMUSCULAR at 20:20

## 2019-04-08 RX ADMIN — AMLODIPINE BESYLATE 10 MILLIGRAM(S): 2.5 TABLET ORAL at 08:19

## 2019-04-08 RX ADMIN — SODIUM CHLORIDE 1 GRAM(S): 9 INJECTION INTRAMUSCULAR; INTRAVENOUS; SUBCUTANEOUS at 08:19

## 2019-04-08 RX ADMIN — PANTOPRAZOLE SODIUM 40 MILLIGRAM(S): 20 TABLET, DELAYED RELEASE ORAL at 08:19

## 2019-04-08 RX ADMIN — SODIUM CHLORIDE 1 GRAM(S): 9 INJECTION INTRAMUSCULAR; INTRAVENOUS; SUBCUTANEOUS at 14:30

## 2019-04-08 RX ADMIN — BENZOCAINE AND MENTHOL 1 LOZENGE: 5; 1 LIQUID ORAL at 08:11

## 2019-04-08 RX ADMIN — DIVALPROEX SODIUM 1000 MILLIGRAM(S): 500 TABLET, DELAYED RELEASE ORAL at 20:20

## 2019-04-08 NOTE — PROGRESS NOTE ADULT - SUBJECTIVE AND OBJECTIVE BOX
CC/Reason for Consult: Hyponatremia     SUBJECTIVE / OVERNIGHT EVENTS: seen and examined in day room. Patient sitting in a chair. Denies any complaints. Says he wants to go home. Says he does not drink too much water. Denies being thirsty all the time. No pain, no nausea, vomiting and diarrhea.     MEDICATIONS  (STANDING):  amLODIPine   Tablet 10 milliGRAM(s) Oral daily  diVALproex  milliGRAM(s) Oral daily  diVALproex ER 1000 milliGRAM(s) Oral at bedtime  haloperidol     Tablet 5 milliGRAM(s) Oral two times a day  lisinopril 5 milliGRAM(s) Oral daily  pantoprazole    Tablet 40 milliGRAM(s) Oral before breakfast  sodium chloride 1 Gram(s) Oral three times a day    MEDICATIONS  (PRN):  acetaminophen   Tablet .. 650 milliGRAM(s) Oral every 6 hours PRN Mild Pain (1 - 3), Moderate Pain (4 - 6)  benzocaine 15 mG/menthol 3.6 mG Lozenge 1 Lozenge Oral every 2 hours PRN Sore Throat  haloperidol    Injectable 5 milliGRAM(s) IntraMuscular once PRN agitation  LORazepam   Injectable 2 milliGRAM(s) IntraMuscular once PRN Agitation    Vital Signs Last 24 Hrs  T(C): 36.8 (08 Apr 2019 06:39), Max: 36.8 (08 Apr 2019 06:39)  T(F): 98.2 (08 Apr 2019 06:39), Max: 98.2 (08 Apr 2019 06:39)  HR: --65  BP: --132/62  BP(mean): --  RR: --16  SpO2: --    PHYSICAL EXAM:  GENERAL: NAD, well-developed  HEAD:  Atraumatic, Normocephalic  EYES: EOMI, PERRLA, conjunctiva and sclera clear  NECK: Supple, No JVD  CHEST/LUNG: Clear to auscultation bilaterally; No wheeze  HEART: Regular rate and rhythm; No murmurs, rubs, or gallops  ABDOMEN: Soft, Nontender, Nondistended; Bowel sounds present  EXTREMITIES:  2+ Peripheral Pulses, No clubbing, cyanosis, or edema  PSYCH: AAOx3  NEUROLOGY: non-focal  SKIN: No rashes or lesions    LABS:    04-08    122<L>  |  85<L>  |  8   ----------------------------<  93  4.8   |  25  |  0.80    Ca    9.2      08 Apr 2019 07:45    Osmolality, Random Urine (04.08.19 @ 11:17)    Osmolality, Random Urine: 124 mosmo/kg    RADIOLOGY & ADDITIONAL TESTS:    Imaging Personally Reviewed:    Consultant(s) Notes Reviewed:      Care Discussed with Consultants/Other Providers:

## 2019-04-08 NOTE — PROGRESS NOTE ADULT - ASSESSMENT
64 yo M with Bipolar disorder, schizophrenia, HTN, GERD, and chronic hyponatremia presumably secondary to psychogenic polydipsia    # Hyponatremia - suspect from psychogenic polydipsia. Low urine osm consistent with dilute urine. Currently asymptomatic from hyponatremia. Recommend to fluid restrict to 1L/day. Increase salt tabs to 1TID. Repeat BMP in AM.     # HTN - BP acceptable. C/w amlodipine and lisinopril    #Bipolar disorder -management per psych    Case discussed with NP.

## 2019-04-09 PROBLEM — Z78.9 OTHER SPECIFIED HEALTH STATUS: Chronic | Status: ACTIVE | Noted: 2019-04-01

## 2019-04-09 LAB
ANION GAP SERPL CALC-SCNC: 11 MMO/L — SIGNIFICANT CHANGE UP (ref 7–14)
BUN SERPL-MCNC: 11 MG/DL — SIGNIFICANT CHANGE UP (ref 7–23)
CALCIUM SERPL-MCNC: 9.6 MG/DL — SIGNIFICANT CHANGE UP (ref 8.4–10.5)
CHLORIDE SERPL-SCNC: 89 MMOL/L — LOW (ref 98–107)
CO2 SERPL-SCNC: 24 MMOL/L — SIGNIFICANT CHANGE UP (ref 22–31)
CREAT SERPL-MCNC: 0.92 MG/DL — SIGNIFICANT CHANGE UP (ref 0.5–1.3)
GLUCOSE SERPL-MCNC: 94 MG/DL — SIGNIFICANT CHANGE UP (ref 70–99)
POTASSIUM SERPL-MCNC: 4.9 MMOL/L — SIGNIFICANT CHANGE UP (ref 3.5–5.3)
POTASSIUM SERPL-SCNC: 4.9 MMOL/L — SIGNIFICANT CHANGE UP (ref 3.5–5.3)
SODIUM SERPL-SCNC: 124 MMOL/L — LOW (ref 135–145)

## 2019-04-09 PROCEDURE — 99233 SBSQ HOSP IP/OBS HIGH 50: CPT

## 2019-04-09 PROCEDURE — 99232 SBSQ HOSP IP/OBS MODERATE 35: CPT

## 2019-04-09 RX ADMIN — SODIUM CHLORIDE 1 GRAM(S): 9 INJECTION INTRAMUSCULAR; INTRAVENOUS; SUBCUTANEOUS at 08:40

## 2019-04-09 RX ADMIN — LISINOPRIL 5 MILLIGRAM(S): 2.5 TABLET ORAL at 08:40

## 2019-04-09 RX ADMIN — DIVALPROEX SODIUM 500 MILLIGRAM(S): 500 TABLET, DELAYED RELEASE ORAL at 08:39

## 2019-04-09 RX ADMIN — AMLODIPINE BESYLATE 10 MILLIGRAM(S): 2.5 TABLET ORAL at 08:39

## 2019-04-09 RX ADMIN — BENZOCAINE AND MENTHOL 1 LOZENGE: 5; 1 LIQUID ORAL at 20:44

## 2019-04-09 RX ADMIN — BENZOCAINE AND MENTHOL 1 LOZENGE: 5; 1 LIQUID ORAL at 07:00

## 2019-04-09 RX ADMIN — PANTOPRAZOLE SODIUM 40 MILLIGRAM(S): 20 TABLET, DELAYED RELEASE ORAL at 08:40

## 2019-04-09 RX ADMIN — SODIUM CHLORIDE 1 GRAM(S): 9 INJECTION INTRAMUSCULAR; INTRAVENOUS; SUBCUTANEOUS at 20:44

## 2019-04-09 RX ADMIN — SODIUM CHLORIDE 1 GRAM(S): 9 INJECTION INTRAMUSCULAR; INTRAVENOUS; SUBCUTANEOUS at 13:18

## 2019-04-09 RX ADMIN — HALOPERIDOL DECANOATE 5 MILLIGRAM(S): 100 INJECTION INTRAMUSCULAR at 20:44

## 2019-04-09 RX ADMIN — DIVALPROEX SODIUM 1000 MILLIGRAM(S): 500 TABLET, DELAYED RELEASE ORAL at 20:44

## 2019-04-09 RX ADMIN — BENZOCAINE AND MENTHOL 1 LOZENGE: 5; 1 LIQUID ORAL at 10:24

## 2019-04-09 RX ADMIN — BENZOCAINE AND MENTHOL 1 LOZENGE: 5; 1 LIQUID ORAL at 18:29

## 2019-04-09 NOTE — PROGRESS NOTE ADULT - ASSESSMENT
62 yo M with Bipolar disorder, schizophrenia, HTN, GERD, and chronic hyponatremia presumably secondary to psychogenic polydipsia    # Hyponatremia - suspect from psychogenic polydipsia. Low urine osm and sodium consistent with dilute urine. Currently asymptomatic from hyponatremia. Recommend to fluid restrict to 1L/day. Increase salt tabs to 1TID. Serum sodium improved t0 124 today. Repeat BMP on 4/12/19.    # HTN - BP acceptable. C/w amlodipine and lisinopril    #Bipolar disorder -management per psych    Case discussed with patient.

## 2019-04-09 NOTE — PROGRESS NOTE ADULT - SUBJECTIVE AND OBJECTIVE BOX
CC/Reason for Consult: Hyponatremia     SUBJECTIVE / OVERNIGHT EVENTS: Feels well. Denies any complaints. States he does not drink much fluids.      MEDICATIONS  (STANDING):  amLODIPine   Tablet 10 milliGRAM(s) Oral daily  diVALproex  milliGRAM(s) Oral daily  diVALproex ER 1000 milliGRAM(s) Oral at bedtime  haloperidol     Tablet 5 milliGRAM(s) Oral two times a day  lisinopril 5 milliGRAM(s) Oral daily  pantoprazole    Tablet 40 milliGRAM(s) Oral before breakfast  sodium chloride 1 Gram(s) Oral three times a day    MEDICATIONS  (PRN):  acetaminophen   Tablet .. 650 milliGRAM(s) Oral every 6 hours PRN Mild Pain (1 - 3), Moderate Pain (4 - 6)  benzocaine 15 mG/menthol 3.6 mG Lozenge 1 Lozenge Oral every 2 hours PRN Sore Throat  haloperidol    Injectable 5 milliGRAM(s) IntraMuscular once PRN agitation  LORazepam   Injectable 2 milliGRAM(s) IntraMuscular once PRN Agitation    Vital Signs Last 24 Hrs  T(C): 36.3 (09 Apr 2019 07:18), Max: 36.3 (09 Apr 2019 07:18)  T(F): 97.4 (09 Apr 2019 07:18), Max: 97.4 (09 Apr 2019 07:18)  HR: 70 (09 Apr 2019 07:18) (70 - 70)  BP: 124/57 (09 Apr 2019 07:18) (124/57 - 124/57)  BP(mean): --  RR: --  SpO2: --    PHYSICAL EXAM:  GENERAL: NAD, well-developed  HEAD:  Atraumatic, Normocephalic  EYES: EOMI, PERRLA, conjunctiva and sclera clear  NECK: Supple, No JVD  CHEST/LUNG: Clear to auscultation bilaterally; No wheeze  HEART: Regular rate and rhythm; No murmurs, rubs, or gallops  ABDOMEN: Soft, Nontender, Nondistended; Bowel sounds present  EXTREMITIES:  2+ Peripheral Pulses, No clubbing, cyanosis, or edema  PSYCH: AAOx3  NEUROLOGY: non-focal  SKIN: No rashes or lesions    LABS:    04-09    124<L>  |  89<L>  |  11  ----------------------------<  94  4.9   |  24  |  0.92    Ca    9.6      09 Apr 2019 07:48    Osmolality, Random Urine (04.08.19 @ 11:17)    Osmolality, Random Urine: 124 mosmo/kg    Sodium, Random Urine (04.08.19 @ 11:17)    Sodium, Random Urine: < 20: Reference range not established for this test mmol/L    RADIOLOGY & ADDITIONAL TESTS:    Imaging Personally Reviewed:    Consultant(s) Notes Reviewed:      Care Discussed with Consultants/Other Providers:

## 2019-04-10 PROCEDURE — 99232 SBSQ HOSP IP/OBS MODERATE 35: CPT

## 2019-04-10 RX ORDER — HALOPERIDOL DECANOATE 100 MG/ML
10 INJECTION INTRAMUSCULAR AT BEDTIME
Qty: 0 | Refills: 0 | Status: DISCONTINUED | OUTPATIENT
Start: 2019-04-10 | End: 2019-04-11

## 2019-04-10 RX ADMIN — DIVALPROEX SODIUM 500 MILLIGRAM(S): 500 TABLET, DELAYED RELEASE ORAL at 09:03

## 2019-04-10 RX ADMIN — PANTOPRAZOLE SODIUM 40 MILLIGRAM(S): 20 TABLET, DELAYED RELEASE ORAL at 09:03

## 2019-04-10 RX ADMIN — SODIUM CHLORIDE 1 GRAM(S): 9 INJECTION INTRAMUSCULAR; INTRAVENOUS; SUBCUTANEOUS at 13:24

## 2019-04-10 RX ADMIN — BENZOCAINE AND MENTHOL 1 LOZENGE: 5; 1 LIQUID ORAL at 17:12

## 2019-04-10 RX ADMIN — LISINOPRIL 5 MILLIGRAM(S): 2.5 TABLET ORAL at 09:03

## 2019-04-10 RX ADMIN — DIVALPROEX SODIUM 1000 MILLIGRAM(S): 500 TABLET, DELAYED RELEASE ORAL at 21:20

## 2019-04-10 RX ADMIN — BENZOCAINE AND MENTHOL 1 LOZENGE: 5; 1 LIQUID ORAL at 08:00

## 2019-04-10 RX ADMIN — AMLODIPINE BESYLATE 10 MILLIGRAM(S): 2.5 TABLET ORAL at 09:03

## 2019-04-10 RX ADMIN — BENZOCAINE AND MENTHOL 1 LOZENGE: 5; 1 LIQUID ORAL at 19:30

## 2019-04-10 RX ADMIN — SODIUM CHLORIDE 1 GRAM(S): 9 INJECTION INTRAMUSCULAR; INTRAVENOUS; SUBCUTANEOUS at 09:03

## 2019-04-10 RX ADMIN — BENZOCAINE AND MENTHOL 1 LOZENGE: 5; 1 LIQUID ORAL at 21:16

## 2019-04-10 RX ADMIN — SODIUM CHLORIDE 1 GRAM(S): 9 INJECTION INTRAMUSCULAR; INTRAVENOUS; SUBCUTANEOUS at 21:21

## 2019-04-11 PROCEDURE — 99232 SBSQ HOSP IP/OBS MODERATE 35: CPT

## 2019-04-11 RX ORDER — CLONAZEPAM 1 MG
0.5 TABLET ORAL AT BEDTIME
Qty: 0 | Refills: 0 | Status: DISCONTINUED | OUTPATIENT
Start: 2019-04-11 | End: 2019-04-16

## 2019-04-11 RX ADMIN — BENZOCAINE AND MENTHOL 1 LOZENGE: 5; 1 LIQUID ORAL at 10:00

## 2019-04-11 RX ADMIN — Medication 100 MILLIGRAM(S): at 19:30

## 2019-04-11 RX ADMIN — BENZOCAINE AND MENTHOL 1 LOZENGE: 5; 1 LIQUID ORAL at 01:58

## 2019-04-11 RX ADMIN — DIVALPROEX SODIUM 500 MILLIGRAM(S): 500 TABLET, DELAYED RELEASE ORAL at 08:59

## 2019-04-11 RX ADMIN — AMLODIPINE BESYLATE 10 MILLIGRAM(S): 2.5 TABLET ORAL at 08:59

## 2019-04-11 RX ADMIN — BENZOCAINE AND MENTHOL 1 LOZENGE: 5; 1 LIQUID ORAL at 21:30

## 2019-04-11 RX ADMIN — BENZOCAINE AND MENTHOL 1 LOZENGE: 5; 1 LIQUID ORAL at 15:10

## 2019-04-11 RX ADMIN — SODIUM CHLORIDE 1 GRAM(S): 9 INJECTION INTRAMUSCULAR; INTRAVENOUS; SUBCUTANEOUS at 12:18

## 2019-04-11 RX ADMIN — BENZOCAINE AND MENTHOL 1 LOZENGE: 5; 1 LIQUID ORAL at 19:30

## 2019-04-11 RX ADMIN — PANTOPRAZOLE SODIUM 40 MILLIGRAM(S): 20 TABLET, DELAYED RELEASE ORAL at 08:59

## 2019-04-11 RX ADMIN — Medication 0.5 MILLIGRAM(S): at 21:04

## 2019-04-11 RX ADMIN — SODIUM CHLORIDE 1 GRAM(S): 9 INJECTION INTRAMUSCULAR; INTRAVENOUS; SUBCUTANEOUS at 21:04

## 2019-04-11 RX ADMIN — SODIUM CHLORIDE 1 GRAM(S): 9 INJECTION INTRAMUSCULAR; INTRAVENOUS; SUBCUTANEOUS at 08:59

## 2019-04-11 RX ADMIN — LISINOPRIL 5 MILLIGRAM(S): 2.5 TABLET ORAL at 08:59

## 2019-04-11 RX ADMIN — DIVALPROEX SODIUM 1000 MILLIGRAM(S): 500 TABLET, DELAYED RELEASE ORAL at 21:04

## 2019-04-12 LAB
ANION GAP SERPL CALC-SCNC: 8 MMO/L — SIGNIFICANT CHANGE UP (ref 7–14)
BUN SERPL-MCNC: 15 MG/DL — SIGNIFICANT CHANGE UP (ref 7–23)
CALCIUM SERPL-MCNC: 9.7 MG/DL — SIGNIFICANT CHANGE UP (ref 8.4–10.5)
CHLORIDE SERPL-SCNC: 89 MMOL/L — LOW (ref 98–107)
CO2 SERPL-SCNC: 28 MMOL/L — SIGNIFICANT CHANGE UP (ref 22–31)
CREAT SERPL-MCNC: 0.96 MG/DL — SIGNIFICANT CHANGE UP (ref 0.5–1.3)
GLUCOSE SERPL-MCNC: 96 MG/DL — SIGNIFICANT CHANGE UP (ref 70–99)
POTASSIUM SERPL-MCNC: 5.1 MMOL/L — SIGNIFICANT CHANGE UP (ref 3.5–5.3)
POTASSIUM SERPL-SCNC: 5.1 MMOL/L — SIGNIFICANT CHANGE UP (ref 3.5–5.3)
SODIUM SERPL-SCNC: 125 MMOL/L — LOW (ref 135–145)

## 2019-04-12 PROCEDURE — 99233 SBSQ HOSP IP/OBS HIGH 50: CPT

## 2019-04-12 PROCEDURE — 99232 SBSQ HOSP IP/OBS MODERATE 35: CPT

## 2019-04-12 RX ORDER — LANOLIN ALCOHOL/MO/W.PET/CERES
3 CREAM (GRAM) TOPICAL AT BEDTIME
Qty: 0 | Refills: 0 | Status: DISCONTINUED | OUTPATIENT
Start: 2019-04-12 | End: 2019-04-16

## 2019-04-12 RX ADMIN — Medication 0.5 MILLIGRAM(S): at 21:15

## 2019-04-12 RX ADMIN — DIVALPROEX SODIUM 500 MILLIGRAM(S): 500 TABLET, DELAYED RELEASE ORAL at 08:29

## 2019-04-12 RX ADMIN — Medication 100 MILLIGRAM(S): at 13:02

## 2019-04-12 RX ADMIN — SODIUM CHLORIDE 1 GRAM(S): 9 INJECTION INTRAMUSCULAR; INTRAVENOUS; SUBCUTANEOUS at 21:15

## 2019-04-12 RX ADMIN — Medication 100 MILLIGRAM(S): at 05:15

## 2019-04-12 RX ADMIN — SODIUM CHLORIDE 1 GRAM(S): 9 INJECTION INTRAMUSCULAR; INTRAVENOUS; SUBCUTANEOUS at 13:02

## 2019-04-12 RX ADMIN — LISINOPRIL 5 MILLIGRAM(S): 2.5 TABLET ORAL at 08:29

## 2019-04-12 RX ADMIN — AMLODIPINE BESYLATE 10 MILLIGRAM(S): 2.5 TABLET ORAL at 08:29

## 2019-04-12 RX ADMIN — PANTOPRAZOLE SODIUM 40 MILLIGRAM(S): 20 TABLET, DELAYED RELEASE ORAL at 06:45

## 2019-04-12 RX ADMIN — BENZOCAINE AND MENTHOL 1 LOZENGE: 5; 1 LIQUID ORAL at 01:15

## 2019-04-12 RX ADMIN — BENZOCAINE AND MENTHOL 1 LOZENGE: 5; 1 LIQUID ORAL at 13:02

## 2019-04-12 RX ADMIN — BENZOCAINE AND MENTHOL 1 LOZENGE: 5; 1 LIQUID ORAL at 05:15

## 2019-04-12 RX ADMIN — SODIUM CHLORIDE 1 GRAM(S): 9 INJECTION INTRAMUSCULAR; INTRAVENOUS; SUBCUTANEOUS at 08:29

## 2019-04-12 RX ADMIN — DIVALPROEX SODIUM 1000 MILLIGRAM(S): 500 TABLET, DELAYED RELEASE ORAL at 21:15

## 2019-04-12 NOTE — PROGRESS NOTE ADULT - ASSESSMENT
64 yo M with Bipolar disorder, schizophrenia, HTN, GERD, and chronic hyponatremia presumably secondary to psychogenic polydipsia    # Hyponatremia - Sodium remains low despite fluid restriction. Patient states that he is compliant with fluid restriction. Initial suspicion was from psychogenic polydipsia but no improvement with fluid restriction. Low urine osm and sodium consistent with dilute urine. Currently asymptomatic from hyponatremia. Would recheck specific gravity, urine osm and urine Na. Recommend to continue with fluid restriction to 1L/day. C/w salt tabs 1TID. Other causes may include corticosteroid deficiency hence will check cortisol level. TSH is WNL so hypothyroid is less likely. Possible reset osomostat? Repeat BMP on 4/15/19.    # HTN - BP acceptable. C/w amlodipine and lisinopril    #Bipolar disorder -management per psych    Case discussed with patient.

## 2019-04-12 NOTE — PROGRESS NOTE ADULT - SUBJECTIVE AND OBJECTIVE BOX
CC/Reason for Consult: Hyponatremia     SUBJECTIVE / OVERNIGHT EVENTS: Feels well. Denies any complaints. States he does not drink much fluids.      MEDICATIONS  (STANDING):  amLODIPine   Tablet 10 milliGRAM(s) Oral daily  diVALproex  milliGRAM(s) Oral daily  diVALproex ER 1000 milliGRAM(s) Oral at bedtime  haloperidol     Tablet 5 milliGRAM(s) Oral two times a day  lisinopril 5 milliGRAM(s) Oral daily  pantoprazole    Tablet 40 milliGRAM(s) Oral before breakfast  sodium chloride 1 Gram(s) Oral three times a day    MEDICATIONS  (PRN):  acetaminophen   Tablet .. 650 milliGRAM(s) Oral every 6 hours PRN Mild Pain (1 - 3), Moderate Pain (4 - 6)  benzocaine 15 mG/menthol 3.6 mG Lozenge 1 Lozenge Oral every 2 hours PRN Sore Throat  haloperidol    Injectable 5 milliGRAM(s) IntraMuscular once PRN agitation  LORazepam   Injectable 2 milliGRAM(s) IntraMuscular once PRN Agitation    Vital Signs Last 24 Hrs  T(C): 36.2 (12 Apr 2019 07:36), Max: 36.2 (12 Apr 2019 07:36)  T(F): 97.1 (12 Apr 2019 07:36), Max: 97.1 (12 Apr 2019 07:36)  HR: 58 (12 Apr 2019 07:36) (58 - 58)  BP: 125/64 (12 Apr 2019 07:36) (125/64 - 125/64)  BP(mean): --  RR: --17  SpO2: --    PHYSICAL EXAM:  GENERAL: NAD, well-developed  HEAD:  Atraumatic, Normocephalic  EYES: EOMI, PERRLA, conjunctiva and sclera clear  NECK: Supple, No JVD  CHEST/LUNG: Clear to auscultation bilaterally; No wheeze  HEART: Regular rate and rhythm; No murmurs, rubs, or gallops  ABDOMEN: Soft, Nontender, Nondistended; Bowel sounds present  EXTREMITIES:  2+ Peripheral Pulses, No clubbing, cyanosis, or edema  PSYCH: AAOx3  NEUROLOGY: non-focal  SKIN: No rashes or lesions    LABS:    04-12    125<L>  |  89<L>  |  15  ----------------------------<  96  5.1   |  28  |  0.96    Ca    9.7      12 Apr 2019 08:48    Osmolality, Random Urine (04.08.19 @ 11:17)    Osmolality, Random Urine: 124 mosmo/kg    Sodium, Random Urine (04.08.19 @ 11:17)    Sodium, Random Urine: < 20: Reference range not established for this test mmol/L    RADIOLOGY & ADDITIONAL TESTS:    Imaging Personally Reviewed:    Consultant(s) Notes Reviewed:      Care Discussed with Consultants/Other Providers:

## 2019-04-13 RX ADMIN — PANTOPRAZOLE SODIUM 40 MILLIGRAM(S): 20 TABLET, DELAYED RELEASE ORAL at 09:05

## 2019-04-13 RX ADMIN — BENZOCAINE AND MENTHOL 1 LOZENGE: 5; 1 LIQUID ORAL at 19:37

## 2019-04-13 RX ADMIN — BENZOCAINE AND MENTHOL 1 LOZENGE: 5; 1 LIQUID ORAL at 13:52

## 2019-04-13 RX ADMIN — Medication 100 MILLIGRAM(S): at 15:16

## 2019-04-13 RX ADMIN — SODIUM CHLORIDE 1 GRAM(S): 9 INJECTION INTRAMUSCULAR; INTRAVENOUS; SUBCUTANEOUS at 21:40

## 2019-04-13 RX ADMIN — Medication 100 MILLIGRAM(S): at 05:40

## 2019-04-13 RX ADMIN — DIVALPROEX SODIUM 1000 MILLIGRAM(S): 500 TABLET, DELAYED RELEASE ORAL at 21:40

## 2019-04-13 RX ADMIN — SODIUM CHLORIDE 1 GRAM(S): 9 INJECTION INTRAMUSCULAR; INTRAVENOUS; SUBCUTANEOUS at 12:38

## 2019-04-13 RX ADMIN — BENZOCAINE AND MENTHOL 1 LOZENGE: 5; 1 LIQUID ORAL at 05:40

## 2019-04-13 RX ADMIN — Medication 0.5 MILLIGRAM(S): at 21:40

## 2019-04-13 RX ADMIN — LISINOPRIL 5 MILLIGRAM(S): 2.5 TABLET ORAL at 09:05

## 2019-04-13 RX ADMIN — SODIUM CHLORIDE 1 GRAM(S): 9 INJECTION INTRAMUSCULAR; INTRAVENOUS; SUBCUTANEOUS at 09:05

## 2019-04-13 RX ADMIN — DIVALPROEX SODIUM 500 MILLIGRAM(S): 500 TABLET, DELAYED RELEASE ORAL at 09:05

## 2019-04-13 RX ADMIN — AMLODIPINE BESYLATE 10 MILLIGRAM(S): 2.5 TABLET ORAL at 09:04

## 2019-04-14 RX ADMIN — Medication 100 MILLIGRAM(S): at 21:32

## 2019-04-14 RX ADMIN — DIVALPROEX SODIUM 500 MILLIGRAM(S): 500 TABLET, DELAYED RELEASE ORAL at 08:53

## 2019-04-14 RX ADMIN — Medication 100 MILLIGRAM(S): at 04:47

## 2019-04-14 RX ADMIN — DIVALPROEX SODIUM 1000 MILLIGRAM(S): 500 TABLET, DELAYED RELEASE ORAL at 20:52

## 2019-04-14 RX ADMIN — BENZOCAINE AND MENTHOL 1 LOZENGE: 5; 1 LIQUID ORAL at 19:05

## 2019-04-14 RX ADMIN — AMLODIPINE BESYLATE 10 MILLIGRAM(S): 2.5 TABLET ORAL at 08:53

## 2019-04-14 RX ADMIN — BENZOCAINE AND MENTHOL 1 LOZENGE: 5; 1 LIQUID ORAL at 08:54

## 2019-04-14 RX ADMIN — BENZOCAINE AND MENTHOL 1 LOZENGE: 5; 1 LIQUID ORAL at 04:47

## 2019-04-14 RX ADMIN — SODIUM CHLORIDE 1 GRAM(S): 9 INJECTION INTRAMUSCULAR; INTRAVENOUS; SUBCUTANEOUS at 08:54

## 2019-04-14 RX ADMIN — BENZOCAINE AND MENTHOL 1 LOZENGE: 5; 1 LIQUID ORAL at 21:32

## 2019-04-14 RX ADMIN — PANTOPRAZOLE SODIUM 40 MILLIGRAM(S): 20 TABLET, DELAYED RELEASE ORAL at 08:53

## 2019-04-14 RX ADMIN — SODIUM CHLORIDE 1 GRAM(S): 9 INJECTION INTRAMUSCULAR; INTRAVENOUS; SUBCUTANEOUS at 20:52

## 2019-04-14 RX ADMIN — Medication 100 MILLIGRAM(S): at 15:32

## 2019-04-14 RX ADMIN — BENZOCAINE AND MENTHOL 1 LOZENGE: 5; 1 LIQUID ORAL at 15:32

## 2019-04-14 RX ADMIN — SODIUM CHLORIDE 1 GRAM(S): 9 INJECTION INTRAMUSCULAR; INTRAVENOUS; SUBCUTANEOUS at 13:29

## 2019-04-14 RX ADMIN — Medication 0.5 MILLIGRAM(S): at 20:52

## 2019-04-14 RX ADMIN — LISINOPRIL 5 MILLIGRAM(S): 2.5 TABLET ORAL at 08:53

## 2019-04-15 LAB
ANION GAP SERPL CALC-SCNC: 14 MMO/L — SIGNIFICANT CHANGE UP (ref 7–14)
BUN SERPL-MCNC: 16 MG/DL — SIGNIFICANT CHANGE UP (ref 7–23)
CALCIUM SERPL-MCNC: 8.8 MG/DL — SIGNIFICANT CHANGE UP (ref 8.4–10.5)
CHLORIDE SERPL-SCNC: 91 MMOL/L — LOW (ref 98–107)
CO2 SERPL-SCNC: 22 MMOL/L — SIGNIFICANT CHANGE UP (ref 22–31)
CORTIS SERPL-MCNC: 10.9 UG/DL — SIGNIFICANT CHANGE UP (ref 2.7–18.4)
CREAT SERPL-MCNC: 1.04 MG/DL — SIGNIFICANT CHANGE UP (ref 0.5–1.3)
GLUCOSE SERPL-MCNC: 162 MG/DL — HIGH (ref 70–99)
POTASSIUM SERPL-MCNC: 4.7 MMOL/L — SIGNIFICANT CHANGE UP (ref 3.5–5.3)
POTASSIUM SERPL-SCNC: 4.7 MMOL/L — SIGNIFICANT CHANGE UP (ref 3.5–5.3)
SODIUM SERPL-SCNC: 127 MMOL/L — LOW (ref 135–145)

## 2019-04-15 PROCEDURE — 99232 SBSQ HOSP IP/OBS MODERATE 35: CPT

## 2019-04-15 RX ORDER — DIVALPROEX SODIUM 500 MG/1
1 TABLET, DELAYED RELEASE ORAL
Qty: 0 | Refills: 0 | COMMUNITY

## 2019-04-15 RX ORDER — HALOPERIDOL DECANOATE 100 MG/ML
1 INJECTION INTRAMUSCULAR
Qty: 0 | Refills: 0 | COMMUNITY

## 2019-04-15 RX ORDER — CLONAZEPAM 1 MG
1 TABLET ORAL
Qty: 15 | Refills: 0
Start: 2019-04-15

## 2019-04-15 RX ORDER — SODIUM CHLORIDE 9 MG/ML
1 INJECTION INTRAMUSCULAR; INTRAVENOUS; SUBCUTANEOUS
Qty: 90 | Refills: 0
Start: 2019-04-15

## 2019-04-15 RX ORDER — PANTOPRAZOLE SODIUM 20 MG/1
1 TABLET, DELAYED RELEASE ORAL
Qty: 30 | Refills: 0
Start: 2019-04-15

## 2019-04-15 RX ORDER — DIVALPROEX SODIUM 500 MG/1
2 TABLET, DELAYED RELEASE ORAL
Qty: 60 | Refills: 0
Start: 2019-04-15

## 2019-04-15 RX ORDER — DIVALPROEX SODIUM 500 MG/1
1 TABLET, DELAYED RELEASE ORAL
Qty: 30 | Refills: 0
Start: 2019-04-15

## 2019-04-15 RX ORDER — SODIUM CHLORIDE 9 MG/ML
1 INJECTION INTRAMUSCULAR; INTRAVENOUS; SUBCUTANEOUS
Qty: 0 | Refills: 0 | COMMUNITY

## 2019-04-15 RX ORDER — LISINOPRIL 2.5 MG/1
1 TABLET ORAL
Qty: 30 | Refills: 0
Start: 2019-04-15

## 2019-04-15 RX ORDER — LISINOPRIL 2.5 MG/1
1 TABLET ORAL
Qty: 0 | Refills: 0 | COMMUNITY

## 2019-04-15 RX ORDER — AMLODIPINE BESYLATE 2.5 MG/1
1 TABLET ORAL
Qty: 0 | Refills: 0 | COMMUNITY

## 2019-04-15 RX ORDER — AMLODIPINE BESYLATE 2.5 MG/1
1 TABLET ORAL
Qty: 30 | Refills: 0
Start: 2019-04-15

## 2019-04-15 RX ADMIN — Medication 0.5 MILLIGRAM(S): at 21:00

## 2019-04-15 RX ADMIN — Medication 100 MILLIGRAM(S): at 20:06

## 2019-04-15 RX ADMIN — DIVALPROEX SODIUM 1000 MILLIGRAM(S): 500 TABLET, DELAYED RELEASE ORAL at 21:00

## 2019-04-15 RX ADMIN — AMLODIPINE BESYLATE 10 MILLIGRAM(S): 2.5 TABLET ORAL at 08:28

## 2019-04-15 RX ADMIN — SODIUM CHLORIDE 1 GRAM(S): 9 INJECTION INTRAMUSCULAR; INTRAVENOUS; SUBCUTANEOUS at 08:28

## 2019-04-15 RX ADMIN — PANTOPRAZOLE SODIUM 40 MILLIGRAM(S): 20 TABLET, DELAYED RELEASE ORAL at 08:27

## 2019-04-15 RX ADMIN — DIVALPROEX SODIUM 500 MILLIGRAM(S): 500 TABLET, DELAYED RELEASE ORAL at 08:27

## 2019-04-15 RX ADMIN — Medication 100 MILLIGRAM(S): at 14:06

## 2019-04-15 RX ADMIN — BENZOCAINE AND MENTHOL 1 LOZENGE: 5; 1 LIQUID ORAL at 08:10

## 2019-04-15 RX ADMIN — SODIUM CHLORIDE 1 GRAM(S): 9 INJECTION INTRAMUSCULAR; INTRAVENOUS; SUBCUTANEOUS at 21:00

## 2019-04-15 RX ADMIN — SODIUM CHLORIDE 1 GRAM(S): 9 INJECTION INTRAMUSCULAR; INTRAVENOUS; SUBCUTANEOUS at 14:05

## 2019-04-15 RX ADMIN — LISINOPRIL 5 MILLIGRAM(S): 2.5 TABLET ORAL at 08:27

## 2019-04-15 RX ADMIN — Medication 100 MILLIGRAM(S): at 08:10

## 2019-04-15 RX ADMIN — BENZOCAINE AND MENTHOL 1 LOZENGE: 5; 1 LIQUID ORAL at 13:25

## 2019-04-15 NOTE — PROGRESS NOTE ADULT - SUBJECTIVE AND OBJECTIVE BOX
CC/Reason for Consult: Hyponatremia    SUBJECTIVE / OVERNIGHT EVENTS:  No complaints at this time. States "I'm going home soon".     MEDICATIONS  (STANDING):  amLODIPine   Tablet 10 milliGRAM(s) Oral daily  clonazePAM Tablet 0.5 milliGRAM(s) Oral at bedtime  diVALproex  milliGRAM(s) Oral daily  diVALproex ER 1000 milliGRAM(s) Oral at bedtime  lisinopril 5 milliGRAM(s) Oral daily  pantoprazole    Tablet 40 milliGRAM(s) Oral before breakfast  sodium chloride 1 Gram(s) Oral three times a day    MEDICATIONS  (PRN):  acetaminophen   Tablet .. 650 milliGRAM(s) Oral every 6 hours PRN Mild Pain (1 - 3), Moderate Pain (4 - 6)  benzocaine 15 mG/menthol 3.6 mG Lozenge 1 Lozenge Oral every 2 hours PRN Sore Throat  bismuth subsalicylate Liquid 30 milliLiter(s) Oral four times a day PRN dyspepsia  guaiFENesin    Syrup 100 milliGRAM(s) Oral every 6 hours PRN Cough  haloperidol    Injectable 5 milliGRAM(s) IntraMuscular once PRN agitation  melatonin. 3 milliGRAM(s) Oral at bedtime PRN Insomnia      Vital Signs Last 24 Hrs  T(C): 36.3 (15 Apr 2019 07:55), Max: 36.3 (15 Apr 2019 07:55)  T(F): 97.3 (15 Apr 2019 07:55), Max: 97.3 (15 Apr 2019 07:55)  HR: 62 (15 Apr 2019 07:55) (62 - 62)  BP: 128/61 (15 Apr 2019 07:55) (128/61 - 128/61)  BP(mean): --  RR: --  SpO2: --  CAPILLARY BLOOD GLUCOSE    PHYSICAL EXAM:  GENERAL: NAD, sitting in dining area  HEAD:  Atraumatic, Normocephalic  EYES: EOMI, conjunctiva and sclera clear  NECK: Supple, No JVD  CHEST/LUNG: Comfortable on RA, speaking in full sentences  ABDOMEN: Soft, Nontender  EXTREMITIES:  No clubbing, cyanosis, or edema  PSYCH: AAOx3  NEUROLOGY: non-focal, moving all extremities  SKIN: No rashes or lesions    LABS:    04-15  127<L>  |  91<L>  |  16  ----------------------------<  162<H>  4.7   |  22  |  1.04    Ca    8.8      15 Apr 2019 08:48      RADIOLOGY & ADDITIONAL TESTS:    Imaging Personally Reviewed:    Consultant(s) Notes Reviewed:      Care Discussed with Consultants/Other Providers: JAIR Jones

## 2019-04-15 NOTE — PROGRESS NOTE ADULT - ASSESSMENT
62 yo M with Bipolar disorder, schizophrenia, HTN, GERD, and chronic hyponatremia presumably secondary to psychogenic polydipsia.    # Hyponatremia - Na 127 today, improved from 125 on 4/12, c/w fluid restriction and salt tabs, continue to monitor Na levels (if being discharged soon, can followup with PMD)    # HTN - BP acceptable. C/w amlodipine and lisinopril    #Bipolar disorder - safety precautions and management per psych

## 2019-04-16 VITALS — TEMPERATURE: 97 F

## 2019-04-16 PROCEDURE — 99238 HOSP IP/OBS DSCHRG MGMT 30/<: CPT

## 2019-04-16 RX ADMIN — SODIUM CHLORIDE 1 GRAM(S): 9 INJECTION INTRAMUSCULAR; INTRAVENOUS; SUBCUTANEOUS at 09:26

## 2019-04-16 RX ADMIN — AMLODIPINE BESYLATE 10 MILLIGRAM(S): 2.5 TABLET ORAL at 09:26

## 2019-04-16 RX ADMIN — Medication 100 MILLIGRAM(S): at 11:15

## 2019-04-16 RX ADMIN — SODIUM CHLORIDE 1 GRAM(S): 9 INJECTION INTRAMUSCULAR; INTRAVENOUS; SUBCUTANEOUS at 12:04

## 2019-04-16 RX ADMIN — DIVALPROEX SODIUM 500 MILLIGRAM(S): 500 TABLET, DELAYED RELEASE ORAL at 09:26

## 2019-04-16 RX ADMIN — LISINOPRIL 5 MILLIGRAM(S): 2.5 TABLET ORAL at 09:26

## 2019-04-16 RX ADMIN — Medication 100 MILLIGRAM(S): at 05:10

## 2019-04-16 RX ADMIN — PANTOPRAZOLE SODIUM 40 MILLIGRAM(S): 20 TABLET, DELAYED RELEASE ORAL at 09:26

## 2019-09-01 ENCOUNTER — OUTPATIENT (OUTPATIENT)
Dept: OUTPATIENT SERVICES | Facility: HOSPITAL | Age: 64
LOS: 1 days | End: 2019-09-01
Payer: MEDICARE

## 2019-09-01 PROCEDURE — G9001: CPT

## 2019-09-18 ENCOUNTER — INPATIENT (INPATIENT)
Facility: HOSPITAL | Age: 64
LOS: 7 days | Discharge: ANOTHER HOSPITAL | DRG: 645 | End: 2019-09-26
Attending: HOSPITALIST | Admitting: HOSPITALIST
Payer: MEDICARE

## 2019-09-18 VITALS — WEIGHT: 184.97 LBS

## 2019-09-18 DIAGNOSIS — E87.1 HYPO-OSMOLALITY AND HYPONATREMIA: ICD-10-CM

## 2019-09-18 LAB
ALBUMIN SERPL ELPH-MCNC: 4.2 G/DL — SIGNIFICANT CHANGE UP (ref 3.3–5.2)
ALP SERPL-CCNC: 61 U/L — SIGNIFICANT CHANGE UP (ref 40–120)
ALT FLD-CCNC: 27 U/L — SIGNIFICANT CHANGE UP
AMPHET UR-MCNC: NEGATIVE — SIGNIFICANT CHANGE UP
ANION GAP SERPL CALC-SCNC: 12 MMOL/L — SIGNIFICANT CHANGE UP (ref 5–17)
ANION GAP SERPL CALC-SCNC: 13 MMOL/L — SIGNIFICANT CHANGE UP (ref 5–17)
APAP SERPL-MCNC: <7.5 UG/ML — LOW (ref 10–26)
APPEARANCE UR: CLEAR — SIGNIFICANT CHANGE UP
APPEARANCE UR: CLEAR — SIGNIFICANT CHANGE UP
AST SERPL-CCNC: 30 U/L — SIGNIFICANT CHANGE UP
BARBITURATES UR SCN-MCNC: NEGATIVE — SIGNIFICANT CHANGE UP
BASOPHILS # BLD AUTO: 0.04 K/UL — SIGNIFICANT CHANGE UP (ref 0–0.2)
BASOPHILS NFR BLD AUTO: 0.6 % — SIGNIFICANT CHANGE UP (ref 0–2)
BENZODIAZ UR-MCNC: NEGATIVE — SIGNIFICANT CHANGE UP
BILIRUB SERPL-MCNC: 0.4 MG/DL — SIGNIFICANT CHANGE UP (ref 0.4–2)
BILIRUB UR-MCNC: NEGATIVE — SIGNIFICANT CHANGE UP
BILIRUB UR-MCNC: NEGATIVE — SIGNIFICANT CHANGE UP
BUN SERPL-MCNC: 10 MG/DL — SIGNIFICANT CHANGE UP (ref 8–20)
BUN SERPL-MCNC: 7 MG/DL — LOW (ref 8–20)
CALCIUM SERPL-MCNC: 9 MG/DL — SIGNIFICANT CHANGE UP (ref 8.6–10.2)
CALCIUM SERPL-MCNC: 9.2 MG/DL — SIGNIFICANT CHANGE UP (ref 8.6–10.2)
CHLORIDE SERPL-SCNC: 84 MMOL/L — LOW (ref 98–107)
CHLORIDE SERPL-SCNC: 90 MMOL/L — LOW (ref 98–107)
CHLORIDE UR-SCNC: <27 MMOL/L — SIGNIFICANT CHANGE UP
CO2 SERPL-SCNC: 20 MMOL/L — LOW (ref 22–29)
CO2 SERPL-SCNC: 23 MMOL/L — SIGNIFICANT CHANGE UP (ref 22–29)
COCAINE METAB.OTHER UR-MCNC: NEGATIVE — SIGNIFICANT CHANGE UP
COLOR SPEC: YELLOW — SIGNIFICANT CHANGE UP
COLOR SPEC: YELLOW — SIGNIFICANT CHANGE UP
CREAT ?TM UR-MCNC: 10 MG/DL — SIGNIFICANT CHANGE UP
CREAT SERPL-MCNC: 0.75 MG/DL — SIGNIFICANT CHANGE UP (ref 0.5–1.3)
CREAT SERPL-MCNC: 0.8 MG/DL — SIGNIFICANT CHANGE UP (ref 0.5–1.3)
DIFF PNL FLD: NEGATIVE — SIGNIFICANT CHANGE UP
DIFF PNL FLD: NEGATIVE — SIGNIFICANT CHANGE UP
EOSINOPHIL # BLD AUTO: 0.03 K/UL — SIGNIFICANT CHANGE UP (ref 0–0.5)
EOSINOPHIL NFR BLD AUTO: 0.5 % — SIGNIFICANT CHANGE UP (ref 0–6)
ETHANOL SERPL-MCNC: <10 MG/DL — SIGNIFICANT CHANGE UP
GLUCOSE SERPL-MCNC: 121 MG/DL — HIGH (ref 70–115)
GLUCOSE SERPL-MCNC: 97 MG/DL — SIGNIFICANT CHANGE UP (ref 70–115)
GLUCOSE UR QL: NEGATIVE MG/DL — SIGNIFICANT CHANGE UP
GLUCOSE UR QL: NEGATIVE MG/DL — SIGNIFICANT CHANGE UP
HCT VFR BLD CALC: 35.7 % — LOW (ref 39–50)
HGB BLD-MCNC: 13 G/DL — SIGNIFICANT CHANGE UP (ref 13–17)
IMM GRANULOCYTES NFR BLD AUTO: 0.3 % — SIGNIFICANT CHANGE UP (ref 0–1.5)
KETONES UR-MCNC: NEGATIVE — SIGNIFICANT CHANGE UP
KETONES UR-MCNC: NEGATIVE — SIGNIFICANT CHANGE UP
LEUKOCYTE ESTERASE UR-ACNC: NEGATIVE — SIGNIFICANT CHANGE UP
LEUKOCYTE ESTERASE UR-ACNC: NEGATIVE — SIGNIFICANT CHANGE UP
LYMPHOCYTES # BLD AUTO: 1.38 K/UL — SIGNIFICANT CHANGE UP (ref 1–3.3)
LYMPHOCYTES # BLD AUTO: 21.8 % — SIGNIFICANT CHANGE UP (ref 13–44)
MAGNESIUM SERPL-MCNC: 2.1 MG/DL — SIGNIFICANT CHANGE UP (ref 1.6–2.6)
MCHC RBC-ENTMCNC: 28.4 PG — SIGNIFICANT CHANGE UP (ref 27–34)
MCHC RBC-ENTMCNC: 36.4 GM/DL — HIGH (ref 32–36)
MCV RBC AUTO: 78.1 FL — LOW (ref 80–100)
METHADONE UR-MCNC: NEGATIVE — SIGNIFICANT CHANGE UP
MONOCYTES # BLD AUTO: 0.46 K/UL — SIGNIFICANT CHANGE UP (ref 0–0.9)
MONOCYTES NFR BLD AUTO: 7.3 % — SIGNIFICANT CHANGE UP (ref 2–14)
NEUTROPHILS # BLD AUTO: 4.39 K/UL — SIGNIFICANT CHANGE UP (ref 1.8–7.4)
NEUTROPHILS NFR BLD AUTO: 69.5 % — SIGNIFICANT CHANGE UP (ref 43–77)
NITRITE UR-MCNC: NEGATIVE — SIGNIFICANT CHANGE UP
NITRITE UR-MCNC: NEGATIVE — SIGNIFICANT CHANGE UP
OPIATES UR-MCNC: NEGATIVE — SIGNIFICANT CHANGE UP
OSMOLALITY SERPL: 267 MOSMOL/KG — LOW (ref 280–301)
OSMOLALITY UR: 67 MOSM/KG — LOW (ref 300–1000)
OSMOLALITY UR: 68 MOSM/KG — LOW (ref 300–1000)
PCP SPEC-MCNC: SIGNIFICANT CHANGE UP
PCP UR-MCNC: NEGATIVE — SIGNIFICANT CHANGE UP
PH UR: 7 — SIGNIFICANT CHANGE UP (ref 5–8)
PH UR: 7 — SIGNIFICANT CHANGE UP (ref 5–8)
PHOSPHATE SERPL-MCNC: 3.3 MG/DL — SIGNIFICANT CHANGE UP (ref 2.4–4.7)
PLATELET # BLD AUTO: 292 K/UL — SIGNIFICANT CHANGE UP (ref 150–400)
POTASSIUM SERPL-MCNC: 4.6 MMOL/L — SIGNIFICANT CHANGE UP (ref 3.5–5.3)
POTASSIUM SERPL-MCNC: 4.6 MMOL/L — SIGNIFICANT CHANGE UP (ref 3.5–5.3)
POTASSIUM SERPL-SCNC: 4.6 MMOL/L — SIGNIFICANT CHANGE UP (ref 3.5–5.3)
POTASSIUM SERPL-SCNC: 4.6 MMOL/L — SIGNIFICANT CHANGE UP (ref 3.5–5.3)
PROT SERPL-MCNC: 7 G/DL — SIGNIFICANT CHANGE UP (ref 6.6–8.7)
PROT UR-MCNC: NEGATIVE MG/DL — SIGNIFICANT CHANGE UP
PROT UR-MCNC: NEGATIVE MG/DL — SIGNIFICANT CHANGE UP
RBC # BLD: 4.57 M/UL — SIGNIFICANT CHANGE UP (ref 4.2–5.8)
RBC # FLD: 12.7 % — SIGNIFICANT CHANGE UP (ref 10.3–14.5)
SALICYLATES SERPL-MCNC: <0.6 MG/DL — LOW (ref 10–20)
SODIUM SERPL-SCNC: 116 MMOL/L — CRITICAL LOW (ref 135–145)
SODIUM SERPL-SCNC: 126 MMOL/L — LOW (ref 135–145)
SODIUM UR-SCNC: <30 MMOL/L — SIGNIFICANT CHANGE UP
SODIUM UR-SCNC: <30 MMOL/L — SIGNIFICANT CHANGE UP
SP GR SPEC: 1 — LOW (ref 1.01–1.02)
SP GR SPEC: 1 — LOW (ref 1.01–1.02)
THC UR QL: NEGATIVE — SIGNIFICANT CHANGE UP
TSH SERPL-MCNC: 1.99 UIU/ML — SIGNIFICANT CHANGE UP (ref 0.27–4.2)
URATE SERPL-MCNC: 4.5 MG/DL — SIGNIFICANT CHANGE UP (ref 3.4–7)
UROBILINOGEN FLD QL: NEGATIVE MG/DL — SIGNIFICANT CHANGE UP
UROBILINOGEN FLD QL: NEGATIVE MG/DL — SIGNIFICANT CHANGE UP
WBC # BLD: 6.32 K/UL — SIGNIFICANT CHANGE UP (ref 3.8–10.5)
WBC # FLD AUTO: 6.32 K/UL — SIGNIFICANT CHANGE UP (ref 3.8–10.5)

## 2019-09-18 PROCEDURE — 99291 CRITICAL CARE FIRST HOUR: CPT

## 2019-09-18 RX ORDER — SODIUM CHLORIDE 9 MG/ML
1000 INJECTION, SOLUTION INTRAVENOUS
Refills: 0 | Status: DISCONTINUED | OUTPATIENT
Start: 2019-09-18 | End: 2019-09-19

## 2019-09-18 RX ORDER — LISINOPRIL 2.5 MG/1
5 TABLET ORAL DAILY
Refills: 0 | Status: DISCONTINUED | OUTPATIENT
Start: 2019-09-18 | End: 2019-09-26

## 2019-09-18 RX ORDER — CHLORHEXIDINE GLUCONATE 213 G/1000ML
1 SOLUTION TOPICAL
Refills: 0 | Status: DISCONTINUED | OUTPATIENT
Start: 2019-09-18 | End: 2019-09-26

## 2019-09-18 RX ORDER — HALOPERIDOL DECANOATE 100 MG/ML
5 INJECTION INTRAMUSCULAR ONCE
Refills: 0 | Status: COMPLETED | OUTPATIENT
Start: 2019-09-18 | End: 2019-09-18

## 2019-09-18 RX ORDER — PANTOPRAZOLE SODIUM 20 MG/1
40 TABLET, DELAYED RELEASE ORAL ONCE
Refills: 0 | Status: COMPLETED | OUTPATIENT
Start: 2019-09-18 | End: 2019-09-18

## 2019-09-18 RX ORDER — SODIUM CHLORIDE 5 G/100ML
1000 INJECTION, SOLUTION INTRAVENOUS
Refills: 0 | Status: DISCONTINUED | OUTPATIENT
Start: 2019-09-18 | End: 2019-09-18

## 2019-09-18 RX ORDER — ENOXAPARIN SODIUM 100 MG/ML
40 INJECTION SUBCUTANEOUS DAILY
Refills: 0 | Status: DISCONTINUED | OUTPATIENT
Start: 2019-09-18 | End: 2019-09-26

## 2019-09-18 RX ORDER — AMLODIPINE BESYLATE 2.5 MG/1
10 TABLET ORAL DAILY
Refills: 0 | Status: DISCONTINUED | OUTPATIENT
Start: 2019-09-18 | End: 2019-09-26

## 2019-09-18 RX ORDER — CLONAZEPAM 1 MG
0.5 TABLET ORAL AT BEDTIME
Refills: 0 | Status: DISCONTINUED | OUTPATIENT
Start: 2019-09-18 | End: 2019-09-19

## 2019-09-18 RX ADMIN — Medication 30 MILLILITER(S): at 22:39

## 2019-09-18 RX ADMIN — Medication 30 MILLILITER(S): at 14:56

## 2019-09-18 RX ADMIN — HALOPERIDOL DECANOATE 5 MILLIGRAM(S): 100 INJECTION INTRAMUSCULAR at 13:03

## 2019-09-18 RX ADMIN — PANTOPRAZOLE SODIUM 40 MILLIGRAM(S): 20 TABLET, DELAYED RELEASE ORAL at 14:56

## 2019-09-18 RX ADMIN — LISINOPRIL 5 MILLIGRAM(S): 2.5 TABLET ORAL at 18:13

## 2019-09-18 RX ADMIN — CHLORHEXIDINE GLUCONATE 1 APPLICATION(S): 213 SOLUTION TOPICAL at 18:13

## 2019-09-18 RX ADMIN — ENOXAPARIN SODIUM 40 MILLIGRAM(S): 100 INJECTION SUBCUTANEOUS at 18:12

## 2019-09-18 RX ADMIN — Medication 2 MILLIGRAM(S): at 13:04

## 2019-09-18 RX ADMIN — AMLODIPINE BESYLATE 10 MILLIGRAM(S): 2.5 TABLET ORAL at 18:12

## 2019-09-18 RX ADMIN — SODIUM CHLORIDE 60 MILLILITER(S): 5 INJECTION, SOLUTION INTRAVENOUS at 14:56

## 2019-09-18 RX ADMIN — SODIUM CHLORIDE 75 MILLILITER(S): 9 INJECTION, SOLUTION INTRAVENOUS at 20:30

## 2019-09-18 NOTE — H&P ADULT - HISTORY OF PRESENT ILLNESS
Pt is a 63 y/o Romanian speaking M (poor historian) w/ a PMHx of schizophrenia who presented to the ED, escorted by [facility], w/ agitation. Sent in by his . Pt states that he was at home cooking in his kitchen, and the next thing he knew he was being brought to the hospital by the police. According to pt's , pt has not been taking prescribed haldol. On arrival to the ED, pt was spitting and using foul language w/ staff. Pt is a 63 y/o Setswana speaking M (poor historian) w/ a PMHx of schizophrenia who presented to the ED, escorted by [employer], w/ agitation. Sent in by his . Pt states that he was at home cooking in his kitchen, and the next thing he knew he was being brought to the hospital by the police. According to pt's , pt has not been taking prescribed haldol. On arrival to the ED, pt was spitting and using foul language w/ staff. Pt found to be hyponatremic, Na 116. MICU consult called to evaluate pt for hyponatremia (previously admitted to [facility] this year for hyponatremia). In the ED, pt was started on 2% NaCl infusion per nephro (Dr. Choudhary) recs. Pt states that he does not know why he was brought to the hospital. At this time, pt only endorses burning pain in his stomach. Pt states that he does not think he is schizophrenic, and that the medicine he is prescribed makes his head feel huge. Pt denies hearing voices. According to pt's medication record, he takes NaCl tabs at home, but pt is unaware as to why he is prescribed them. Denies fever, chills, chest pain, SOB, headache, N/V/D.

## 2019-09-18 NOTE — H&P ADULT - NSICDXFAMILYHX_GEN_ALL_CORE_FT
FAMILY HISTORY:  Family history of diabetes mellitus (DM)  No pertinent family history in first degree relatives

## 2019-09-18 NOTE — H&P ADULT - NSHPPHYSICALEXAM_GEN_ALL_CORE
General:  male lying in bed, in no acute distress.  HEENT: Sclera white. PERRL, symmetric.  CV: Regular rate and rhythm, no murmurs.  Resp: Breath sounds symmetric, clear to auscultation B/L.  GI: Soft, nontender, nondistended, bowel sounds normoactive.  Ext: No LE edema B/L.  Skin: Clean, dry, intact. Warm, no rashes noted.  Neuro: Alert and oriented, moves all 4 extremities.

## 2019-09-18 NOTE — CHART NOTE - NSCHARTNOTEFT_GEN_A_CORE
Repeat BMP revealed Na of 126, increased from 116 over 6 1/2 hours.   Will d/c 2% Saline and start D5W @75cc/hr. Repeat BMP @2300.

## 2019-09-18 NOTE — ED ADULT TRIAGE NOTE - CHIEF COMPLAINT QUOTE
Patient agitated, yelling & cursing in Romansh, spitting on floor & at staff. Patient brought in by [employer] from home. Police report patient has Hx of Schizophrenia, has not been taking meds. Patient arrived in handcuffs, PD stated is not in custody at this time, is for safety. Dr. Bernabe called to bedside. Unable to obtain vital signs at this time due to aggressive & combative behavior.

## 2019-09-18 NOTE — H&P ADULT - ASSESSMENT
Pt is a 63 y/o Irish speaking M (poor historian) w/ a PMHx of schizophrenia who presented to the ED, escorted by [employer], w/ agitation. Admitted to the MICU for severe hyponatremia (Na 116).    PLAN:    Neuro: Hx of schizophrenia and bipolar disorder. Reported medication noncompliance. Psych consult. Klonopin 0.5 mg PO at bedtime.  CV: Hx of HTN, will restart home antihypertensives. Amlodipine 10 mg PO daily, Lisinopril 5 mg daily. F/u EKG.  Resp: No active issues.  GI: Hx of GERD w/ complaint of burning epigastric pain, will order Protonix 40 mg IV push and Maalox 30 mL q4 hrs PRN.  Renal: 2% NaCl infusion 60 cc/hr. Recheck BMP at 19:00. Avoid overcorrection of Na, goal 6-8 mEqs in 24 hrs. F/u serum osmolality, TSH, uric acid, UA, urine osmolality, urine lytes.  Endo: No active issues.  ID: No active issues.  Heme/Onc: No active issues.  Dispo: Full code. Pt is a 65 y/o English speaking M (poor historian) w/ a PMHx of schizophrenia who presented to the ED, escorted by [employer], w/ agitation. Admitted to the MICU for severe hyponatremia (Na 116).    PLAN:    Neuro: Hx of schizophrenia and bipolar disorder. Reported medication noncompliance. Psych consult. Klonopin 0.5 mg PO at bedtime, will await psych recs before restarting Depakote.  CV: Hx of HTN, will restart home antihypertensives. Amlodipine 10 mg PO daily, Lisinopril 5 mg daily. F/u EKG.  Resp: No active issues.  GI: Hx of GERD w/ complaint of burning epigastric pain, will order Protonix 40 mg IV push and Maalox 30 mL q4 hrs PRN.  Renal: 2% NaCl infusion 60 cc/hr. Recheck BMP at 19:00. Avoid overcorrection of Na, goal 6-8 mEqs in 24 hrs. F/u serum osmolality, TSH, uric acid, UA, urine osmolality, urine lytes.  Endo: No active issues.  ID: No active issues.  Heme/Onc: DVT prophylaxis w/ Lovenox 40 mg daily.  Dispo: Full code.

## 2019-09-18 NOTE — ED PROVIDER NOTE - OBJECTIVE STATEMENT
The patient is a 64 year old male presents with agitations called in by his own .  Spitting and agitated and apparently not taking his haldol  In ED the patient uses foul languages and denies any complaints

## 2019-09-18 NOTE — ED ADULT NURSE NOTE - NSIMPLEMENTINTERV_GEN_ALL_ED
Implemented All Universal Safety Interventions:  [place] to call system. Call bell, personal items and telephone within reach. Instruct patient to call for assistance. Room bathroom lighting operational. Non-slip footwear when patient is off stretcher. Physically safe environment: no spills, clutter or unnecessary equipment. Stretcher in lowest position, wheels locked, appropriate side rails in place.

## 2019-09-18 NOTE — ED ADULT NURSE NOTE - PMH
Bipolar 1 disorder    Bipolar 1 disorder    Other schizophrenia    Poor historian    bipolar disorder  Schizophrenia   GERD  Chronic hyponatremia   HTN

## 2019-09-18 NOTE — ED ADULT NURSE NOTE - ED STAT RN HANDOFF DETAILS
Pt alert and oriented, in no distress, pt transferred self to bed without incident. Pt handed off in stable condition.

## 2019-09-18 NOTE — H&P ADULT - NSHPLABSRESULTS_GEN_ALL_CORE
13.0   6.32  )-----------( 292      ( 18 Sep 2019 13:19 )             35.7   09-18    116<LL>  |  84<L>  |  10.0  ----------------------------<  121<H>  4.6   |  20.0<L>  |  0.75    Ca    9.0      18 Sep 2019 13:19    TPro  7.0  /  Alb  4.2  /  TBili  0.4  /  DBili  x   /  AST  30  /  ALT  27  /  AlkPhos  61  09-18

## 2019-09-18 NOTE — ED ADULT NURSE NOTE - CHIEF COMPLAINT QUOTE
Patient agitated, yelling & cursing in Occitan, spitting on floor & at staff. Patient brought in by [employer] from home. Police report patient has Hx of Schizophrenia, has not been taking meds. Patient arrived in handcuffs, PD stated is not in custody at this time, is for safety. Dr. Bernabe called to bedside. Unable to obtain vital signs at this time due to aggressive & combative behavior.

## 2019-09-18 NOTE — ED ADULT NURSE REASSESSMENT NOTE - NS ED NURSE REASSESS COMMENT FT1
Assumed care of pt at this time. Pt is resting in bed, NAD noted, respirations even and nonlabored. Pt denies any complaints at this time. Pt educated on plan of care, plan of care taught back to RN. Plan of care education deemed proficient through successful teach back. Will continue to reeducate pt regarding plan of care.
Patient medicated as ordered by SALVADOR Jenkins. Patient calm & cooperative at this time. Placed in yellow gown, belongings secured.
pt care assumed at 1430, no apparent distress noted at this time, charting as noted. pt received Alert and Oriented to person, place, situation and time resting in bed comfortably. ICU PA at bedside. pt is Normal Sinus Rhythm on cardiac monitor. IV fluids running as per orders. pt to be transferred to ICU. pt educated on plan of care, pt able to successfully teach back plan of care to RN, RN will continue to reeducate pt during hospital stay.
Pt calm and cooperative w/ staff s/p medication administration, pt in yellow gown and belongings brought to , awaiting consult.

## 2019-09-18 NOTE — H&P ADULT - NSICDXPASTMEDICALHX_GEN_ALL_CORE_FT
PAST MEDICAL HISTORY:  Bipolar 1 disorder     Bipolar 1 disorder     Other schizophrenia     Poor historian   bipolar disorder  Schizophrenia   GERD  Chronic hyponatremia   HTN

## 2019-09-18 NOTE — ED ADULT NURSE NOTE - OBJECTIVE STATEMENT
pt BIBA from home with [employer] at bedside. as per [employer] pt was "having bizarre behavior at home while SW was there who said pt did not take medication for schizophrenia and called PD." pt is agitated and yelling in triage. pt medicated as per MD orders. pt is cooperative at this time. IV fluids running as per orders. HR is Normal Sinus Rhythm on cardiac monitor, lung sounds are clear b/l, abd is soft and nontender with positive bowel sounds in all four quadrants, skin is warm, dry and appropriate for age and race. pt educated on plan of care, plan of care taught back to RN. proficiency determined from successful pt teach back. will continue to educate pt throughout ED stay.

## 2019-09-18 NOTE — PATIENT PROFILE ADULT - NSPROPTRIGHTSUPPORTPERSON_GEN_A_NUR
08/15/2018    Dysphagia 08/15/2018    Epigastric pain 08/15/declines

## 2019-09-18 NOTE — PHARMACOTHERAPY INTERVENTION NOTE - COMMENTS
Called [facility] to obtain list of medications. As per their records, patient has not had haloperidol delivered since 04/2019, and it was IM decanoate, not PO. Last received fluphenazine 25 mG every 2 weeks via delivery on 08/09/2019, unknown when last administered.

## 2019-09-19 DIAGNOSIS — Z71.89 OTHER SPECIFIED COUNSELING: ICD-10-CM

## 2019-09-19 DIAGNOSIS — F20.89 OTHER SCHIZOPHRENIA: ICD-10-CM

## 2019-09-19 DIAGNOSIS — E87.1 HYPO-OSMOLALITY AND HYPONATREMIA: ICD-10-CM

## 2019-09-19 DIAGNOSIS — F31.9 BIPOLAR DISORDER, UNSPECIFIED: ICD-10-CM

## 2019-09-19 LAB
ANION GAP SERPL CALC-SCNC: 11 MMOL/L — SIGNIFICANT CHANGE UP (ref 5–17)
ANION GAP SERPL CALC-SCNC: 9 MMOL/L — SIGNIFICANT CHANGE UP (ref 5–17)
BUN SERPL-MCNC: 6 MG/DL — LOW (ref 8–20)
BUN SERPL-MCNC: 6 MG/DL — LOW (ref 8–20)
BUN SERPL-MCNC: 7 MG/DL — LOW (ref 8–20)
BUN SERPL-MCNC: 9 MG/DL — SIGNIFICANT CHANGE UP (ref 8–20)
CALCIUM SERPL-MCNC: 8.7 MG/DL — SIGNIFICANT CHANGE UP (ref 8.6–10.2)
CALCIUM SERPL-MCNC: 9 MG/DL — SIGNIFICANT CHANGE UP (ref 8.6–10.2)
CALCIUM SERPL-MCNC: 9.1 MG/DL — SIGNIFICANT CHANGE UP (ref 8.6–10.2)
CALCIUM SERPL-MCNC: 9.4 MG/DL — SIGNIFICANT CHANGE UP (ref 8.6–10.2)
CHLORIDE SERPL-SCNC: 89 MMOL/L — LOW (ref 98–107)
CHLORIDE SERPL-SCNC: 90 MMOL/L — LOW (ref 98–107)
CHLORIDE SERPL-SCNC: 91 MMOL/L — LOW (ref 98–107)
CHLORIDE SERPL-SCNC: 91 MMOL/L — LOW (ref 98–107)
CO2 SERPL-SCNC: 21 MMOL/L — LOW (ref 22–29)
CO2 SERPL-SCNC: 24 MMOL/L — SIGNIFICANT CHANGE UP (ref 22–29)
CO2 SERPL-SCNC: 25 MMOL/L — SIGNIFICANT CHANGE UP (ref 22–29)
CO2 SERPL-SCNC: 25 MMOL/L — SIGNIFICANT CHANGE UP (ref 22–29)
CREAT SERPL-MCNC: 0.71 MG/DL — SIGNIFICANT CHANGE UP (ref 0.5–1.3)
CREAT SERPL-MCNC: 0.81 MG/DL — SIGNIFICANT CHANGE UP (ref 0.5–1.3)
CREAT SERPL-MCNC: 0.87 MG/DL — SIGNIFICANT CHANGE UP (ref 0.5–1.3)
CREAT SERPL-MCNC: 0.99 MG/DL — SIGNIFICANT CHANGE UP (ref 0.5–1.3)
GLUCOSE SERPL-MCNC: 107 MG/DL — SIGNIFICANT CHANGE UP (ref 70–115)
GLUCOSE SERPL-MCNC: 111 MG/DL — SIGNIFICANT CHANGE UP (ref 70–115)
GLUCOSE SERPL-MCNC: 113 MG/DL — SIGNIFICANT CHANGE UP (ref 70–115)
GLUCOSE SERPL-MCNC: 94 MG/DL — SIGNIFICANT CHANGE UP (ref 70–115)
HCT VFR BLD CALC: 41 % — SIGNIFICANT CHANGE UP (ref 39–50)
HGB BLD-MCNC: 14.8 G/DL — SIGNIFICANT CHANGE UP (ref 13–17)
MAGNESIUM SERPL-MCNC: 2.1 MG/DL — SIGNIFICANT CHANGE UP (ref 1.6–2.6)
MCHC RBC-ENTMCNC: 28.5 PG — SIGNIFICANT CHANGE UP (ref 27–34)
MCHC RBC-ENTMCNC: 36.1 GM/DL — HIGH (ref 32–36)
MCV RBC AUTO: 79 FL — LOW (ref 80–100)
PHOSPHATE SERPL-MCNC: 2.7 MG/DL — SIGNIFICANT CHANGE UP (ref 2.4–4.7)
PLATELET # BLD AUTO: 337 K/UL — SIGNIFICANT CHANGE UP (ref 150–400)
POTASSIUM SERPL-MCNC: 4.4 MMOL/L — SIGNIFICANT CHANGE UP (ref 3.5–5.3)
POTASSIUM SERPL-MCNC: 4.5 MMOL/L — SIGNIFICANT CHANGE UP (ref 3.5–5.3)
POTASSIUM SERPL-MCNC: 4.7 MMOL/L — SIGNIFICANT CHANGE UP (ref 3.5–5.3)
POTASSIUM SERPL-MCNC: 4.8 MMOL/L — SIGNIFICANT CHANGE UP (ref 3.5–5.3)
POTASSIUM SERPL-SCNC: 4.4 MMOL/L — SIGNIFICANT CHANGE UP (ref 3.5–5.3)
POTASSIUM SERPL-SCNC: 4.5 MMOL/L — SIGNIFICANT CHANGE UP (ref 3.5–5.3)
POTASSIUM SERPL-SCNC: 4.7 MMOL/L — SIGNIFICANT CHANGE UP (ref 3.5–5.3)
POTASSIUM SERPL-SCNC: 4.8 MMOL/L — SIGNIFICANT CHANGE UP (ref 3.5–5.3)
RBC # BLD: 5.19 M/UL — SIGNIFICANT CHANGE UP (ref 4.2–5.8)
RBC # FLD: 12.9 % — SIGNIFICANT CHANGE UP (ref 10.3–14.5)
SODIUM SERPL-SCNC: 122 MMOL/L — LOW (ref 135–145)
SODIUM SERPL-SCNC: 122 MMOL/L — LOW (ref 135–145)
SODIUM SERPL-SCNC: 127 MMOL/L — LOW (ref 135–145)
SODIUM SERPL-SCNC: 127 MMOL/L — LOW (ref 135–145)
WBC # BLD: 6.58 K/UL — SIGNIFICANT CHANGE UP (ref 3.8–10.5)
WBC # FLD AUTO: 6.58 K/UL — SIGNIFICANT CHANGE UP (ref 3.8–10.5)

## 2019-09-19 PROCEDURE — 99291 CRITICAL CARE FIRST HOUR: CPT

## 2019-09-19 PROCEDURE — 93010 ELECTROCARDIOGRAM REPORT: CPT

## 2019-09-19 PROCEDURE — 99233 SBSQ HOSP IP/OBS HIGH 50: CPT

## 2019-09-19 PROCEDURE — 99223 1ST HOSP IP/OBS HIGH 75: CPT

## 2019-09-19 RX ORDER — CLONAZEPAM 1 MG
2 TABLET ORAL AT BEDTIME
Refills: 0 | Status: DISCONTINUED | OUTPATIENT
Start: 2019-09-19 | End: 2019-09-26

## 2019-09-19 RX ORDER — DIVALPROEX SODIUM 500 MG/1
1 TABLET, DELAYED RELEASE ORAL
Qty: 0 | Refills: 0 | DISCHARGE

## 2019-09-19 RX ORDER — SODIUM CHLORIDE 9 MG/ML
1000 INJECTION, SOLUTION INTRAVENOUS
Refills: 0 | Status: DISCONTINUED | OUTPATIENT
Start: 2019-09-19 | End: 2019-09-19

## 2019-09-19 RX ADMIN — Medication 2 MILLIGRAM(S): at 21:45

## 2019-09-19 RX ADMIN — Medication 30 MILLILITER(S): at 05:20

## 2019-09-19 RX ADMIN — Medication 2 MILLIGRAM(S): at 05:20

## 2019-09-19 RX ADMIN — CHLORHEXIDINE GLUCONATE 1 APPLICATION(S): 213 SOLUTION TOPICAL at 05:20

## 2019-09-19 RX ADMIN — ENOXAPARIN SODIUM 40 MILLIGRAM(S): 100 INJECTION SUBCUTANEOUS at 11:59

## 2019-09-19 NOTE — PROGRESS NOTE ADULT - ASSESSMENT
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 On Norvasc and Lisinopril  Resp/Chest: Maintain SpO2 > 92%  GI/Nutrition: Regular diet. Bowel regimen as needed  ID: No active issues  Renal: Started on D5W @ 125cc/hr. Plan to repeat BMP 12noon and D/C fluid if no change/ decrease in Na. Fluid restrict to 1L after that. He endorsed drinking lots of water at home which is evidenced by his chemistry. Psych consult and Renal f/u  Endocrinology: Maintain Blood sugar < 180. ISS as per protocol  Haem/Oncology:  DVT ppx w/ Lovenox    Critical Care time: 35 minutes assessing presenting problems of acute illness that poses high probability of life threatening deterioration or end organ damage/dysfunction.  Medical decision making including Initiating plan of care, reviewing data, reviewing radiology, discussing with multidisciplinary team, non inclusive of procedures

## 2019-09-19 NOTE — PROGRESS NOTE ADULT - SUBJECTIVE AND OBJECTIVE BOX
Patient is a 64y old  Male who presents with a chief complaint of Hyponatremia (19 Sep 2019 01:36)    BRIEF HOSPITAL COURSE:     63 y/o Sami speaking M (poor historian) w/ a PMHx of schizophrenia who presented to the ED, escorted by LONNY, w/ agitation. Sent in by his . Pt states that he was at home cooking in his kitchen, and the next thing he knew he was being brought to the hospital by the police. According to pt's , pt has not been taking prescribed haldol. On arrival to the ED, pt was spitting and using foul language w/ staff. Pt found to be hyponatremic, Na 116. MICU consult called to evaluate pt for hyponatremia (previously admitted to [facility] this year for hyponatremia). In the ED, pt was started on 2% NaCl infusion per nephro (Dr. Gerber wharton. Pt states that he does not know why he was brought to the hospital. At this time, pt only endorses burning pain in his stomach. Pt states that he does not think he is schizophrenic, and that the medicine he is prescribed makes his head feel huge. Pt denies hearing voices. According to pt's medication record, he takes NaCl tabs at home, but pt is unaware as to why he is prescribed them. Denies fever, chills, chest pain, SOB, headache, N/V/D    Events last 24 hours:   No events overnight. He was started on D5W @ 125cc/hr after Na bumped from 116-> 126/127. Sitting up in bed today eating breakfast. On further questioning this morning he admits to drinking a lot of water. Claims to "get water drunk" to the nursing staff in [place]    PAST MEDICAL & SURGICAL HISTORY:  Poor historian:   bipolar disorder  Schizophrenia   GERD  Chronic hyponatremia   HTN  Bipolar 1 disorder  Bipolar 1 disorder  Other schizophrenia  No significant past surgical history    Review of Systems:  CONSTITUTIONAL: No fever, chills, or fatigue  EYES: No eye pain, visual disturbances, or discharge  ENMT:  No difficulty hearing, tinnitus, vertigo; No sinus or throat pain  NECK: No pain or stiffness  RESPIRATORY: No cough, wheezing, chills or hemoptysis; No shortness of breath  CARDIOVASCULAR: No chest pain, palpitations, dizziness, or leg swelling  GASTROINTESTINAL: No abdominal or epigastric pain. No nausea, vomiting, or hematemesis; No diarrhea or constipation. No melena or hematochezia.  GENITOURINARY: No dysuria, frequency, hematuria, or incontinence  NEUROLOGICAL: No headaches, memory loss, loss of strength, numbness, or tremors  SKIN: No itching, burning, rashes, or lesions   MUSCULOSKELETAL: No joint pain or swelling; No muscle, back, or extremity pain  PSYCHIATRIC: No depression, anxiety, mood swings, or difficulty sleeping    Physical Examination:    ICU Vital Signs Last 24 Hrs  T(C): 36.4 (19 Sep 2019 08:58), Max: 37.1 (18 Sep 2019 12:53)  T(F): 97.6 (19 Sep 2019 08:58), Max: 98.7 (18 Sep 2019 12:53)  HR: 55 (19 Sep 2019 07:00) (46 - 101)  BP: 91/50 (19 Sep 2019 07:00) (91/50 - 164/75)  BP(mean): 66 (19 Sep 2019 07:00) (66 - 108)  ABP: --  ABP(mean): --  RR: 16 (19 Sep 2019 07:00) (12 - 51)  SpO2: 98% (19 Sep 2019 07:00) (97% - 100%)      General: No acute distress.      Neuro: AAO*3, No motor, sensory, or cranial nerve deficit    HEENT: Pupils equal, reactive to light, Moist oral mucosa    PULM: Clear to auscultation bilaterally, no significant adventitious breath sounds     CVS: Regular rhythm and controlled rate, no murmurs, rubs, or gallops    ABD: Soft, nondistended, nontender, normoactive bowel sounds    EXT: No b/l LE edema, nontender with pedal pulse palpable     SKIN: Warm and well perfused, no acute rashes       Medications:    amLODIPine   Tablet 10 milliGRAM(s) Oral daily  lisinopril 5 milliGRAM(s) Oral daily      clonazePAM  Tablet 2 milliGRAM(s) Oral at bedtime      enoxaparin Injectable 40 milliGRAM(s) SubCutaneous daily    aluminum hydroxide/magnesium hydroxide/simethicone Suspension 30 milliLiter(s) Oral every 4 hours PRN        dextrose 5%. 1000 milliLiter(s) IV Continuous <Continuous>      chlorhexidine 2% Cloths 1 Application(s) Topical <User Schedule>            I&O's Detail    18 Sep 2019 07:01  -  19 Sep 2019 07:00  --------------------------------------------------------  IN:    dextrose 5%.: 100 mL    dextrose 5%.: 495 mL    dextrose 5%.: 500 mL    Oral Fluid: 1640 mL    sodium chloride 2%: 270 mL  Total IN: 3005 mL    OUT:    Voided: 2300 mL  Total OUT: 2300 mL    Total NET: 705 mL      19 Sep 2019 07:01  -  19 Sep 2019 09:04  --------------------------------------------------------  IN:    dextrose 5%.: 125 mL  Total IN: 125 mL    OUT:  Total OUT: 0 mL    Total NET: 125 mL            RADIOLOGY/ Microbiology/ Labs: reviewed

## 2019-09-19 NOTE — SWALLOW BEDSIDE ASSESSMENT ADULT - SLP GENERAL OBSERVATIONS
Pt received & seen seated upright in bed, +eating breakfast (self-feeding), +awake/alert, +reduced speech intelligibility, +reduced cognition, 0/10 pain

## 2019-09-19 NOTE — PROGRESS NOTE ADULT - PROBLEM SELECTOR PLAN 3
On Klonepin @ home. Pt with sinus bradycardia, will hold klonepin for now given pt calm, cooperative.

## 2019-09-19 NOTE — SWALLOW BEDSIDE ASSESSMENT ADULT - ASR SWALLOW ASPIRATION MONITOR
upper respiratory infection/oral hygiene/fever/position upright (89Y)/throat clearing/gurgly voice/pneumonia/change of breathing pattern/cough

## 2019-09-19 NOTE — CONSULT NOTE ADULT - SUBJECTIVE AND OBJECTIVE BOX
[facility] DIVISION OF KIDNEY DISEASES AND HYPERTENSION -- INITIAL CONSULT NOTE  --------------------------------------------------------------------------------  HPI:  Pt is a 65 y/o Greek speaking M (poor historian) w/ a PMHx of schizophrenia who presented to the ED, escorted by [employer], w/ agitation. Sent in by his . Pt states that he was at home cooking in his kitchen, and the next thing he knew he was being brought to the hospital by the police. According to pt's , pt has not been taking prescribed haldol. On arrival to the ED, pt was spitting and using foul language w/ staff. Pt found to be hyponatremic, Na 116. MICU consult called to evaluate pt for hyponatremia (previously admitted to [facility] this year for hyponatremia). In the ED, pt was started on 2% NaCl infusion per nephmerle (Dr. Gerber wharton. Pt states that he does not know why he was brought to the hospital. At this time, pt only endorses burning pain in his stomach. Pt states that he does not think he is schizophrenic, and that the medicine he is prescribed makes his head feel huge. Pt denies hearing voices. According to pt's medication record, he takes NaCl tabs at home, but pt is unaware as to why he is prescribed them. Denies fever, chills, chest pain, SOB, headache, N/V/D. Nephrology consult called to evaluate and manage hyponatremia.        PAST HISTORY  --------------------------------------------------------------------------------  PAST MEDICAL & SURGICAL HISTORY:  Poor historian:   bipolar disorder  Schizophrenia   GERD  Chronic hyponatremia   HTN  Bipolar 1 disorder  Bipolar 1 disorder  Other schizophrenia  No significant past surgical history    FAMILY HISTORY:  Family history of diabetes mellitus (DM)  No pertinent family history in first degree relatives    PAST SOCIAL HISTORY:    ALLERGIES & MEDICATIONS  --------------------------------------------------------------------------------  Allergies    No Known Allergies    Intolerances      Standing Inpatient Medications  amLODIPine   Tablet 10 milliGRAM(s) Oral daily  chlorhexidine 2% Cloths 1 Application(s) Topical <User Schedule>  clonazePAM  Tablet 2 milliGRAM(s) Oral at bedtime  enoxaparin Injectable 40 milliGRAM(s) SubCutaneous daily  lisinopril 5 milliGRAM(s) Oral daily    PRN Inpatient Medications  aluminum hydroxide/magnesium hydroxide/simethicone Suspension 30 milliLiter(s) Oral every 4 hours PRN      REVIEW OF SYSTEMS  --------------------------------------------------------------------------------  Gen: No weight changes, fatigue, fevers/chills, weakness  Skin: No rashes  Head/Eyes/Ears/Mouth: No headache; Normal hearing; Normal vision w/o blurriness; No sinus pain/discomfort, sore throat  Respiratory: No dyspnea, cough, wheezing, hemoptysis  CV: No chest pain, PND, orthopnea  GI: No abdominal pain, diarrhea, constipation, nausea, vomiting, melena, hematochezia  : No increased frequency, dysuria, hematuria, nocturia  MSK: No joint pain/swelling; no back pain; no edema  Neuro: No dizziness/lightheadedness, weakness, seizures, numbness, tingling  Heme: No easy bruising or bleeding  Endo: No heat/cold intolerance  Psych: No significant nervousness, anxiety, stress, depression    All other systems were reviewed and are negative, except as noted.    VITALS/PHYSICAL EXAM  --------------------------------------------------------------------------------  T(C): 36.6 (09-19-19 @ 12:45), Max: 36.8 (09-18-19 @ 16:28)  HR: 72 (09-19-19 @ 13:00) (46 - 101)  BP: 138/56 (09-19-19 @ 13:00) (91/50 - 164/75)  RR: 26 (09-19-19 @ 13:00) (12 - 51)  SpO2: 100% (09-19-19 @ 13:00) (97% - 100%)  Wt(kg): --  Height (cm): 162.6 (09-18-19 @ 16:28)  Weight (kg): 75.9 (09-18-19 @ 16:28)  BMI (kg/m2): 28.7 (09-18-19 @ 16:28)  BSA (m2): 1.81 (09-18-19 @ 16:28)      09-18-19 @ 07:01  -  09-19-19 @ 07:00  --------------------------------------------------------  IN: 3005 mL / OUT: 2300 mL / NET: 705 mL    09-19-19 @ 07:01  -  09-19-19 @ 14:22  --------------------------------------------------------  IN: 650 mL / OUT: 650 mL / NET: 0 mL      Physical Exam:  	Gen: NAD, well-appearing  	HEENT: PERRL, supple neck, clear oropharynx  	Pulm: CTA B/L  	CV: RRR, S1S2; no rub  	Back: No spinal or CVA tenderness; no sacral edema  	Abd: +BS, soft, nontender/nondistended  	: No suprapubic tenderness  	UE: Warm, FROM, no clubbing, intact strength; no edema; no asterixis  	LE: Warm, FROM, no clubbing, intact strength; no edema  	Neuro: No focal deficits, intact gait  	Psych: Normal affect and mood  	Skin: Warm, without rashes  	Vascular access:  N/A    LABS/STUDIES  --------------------------------------------------------------------------------              14.8   6.58  >-----------<  337      [09-19-19 @ 05:18]              41.0     122  |  90  |  6.0  ----------------------------<  107      [09-19-19 @ 12:35]  4.7   |  21.0  |  0.81        Ca     8.7     [09-19-19 @ 12:35]      Mg     2.1     [09-19-19 @ 05:18]      Phos  2.7     [09-19-19 @ 05:18]    TPro  7.0  /  Alb  4.2  /  TBili  0.4  /  DBili  x   /  AST  30  /  ALT  27  /  AlkPhos  61  [09-18-19 @ 13:19]        Uric acid 4.5      [09-18-19 @ 19:47]  Serum Osmolality 267      [09-18-19 @ 19:48]    Creatinine Trend:  SCr 0.81 [09-19 @ 12:35]  SCr 0.71 [09-19 @ 05:18]  SCr 0.87 [09-19 @ 00:59]  SCr 0.80 [09-18 @ 19:46]  SCr 0.75 [09-18 @ 13:19]    Urinalysis - [09-18-19 @ 15:37]      Color Yellow / Appearance Clear / SG 1.005 / pH 7.0      Gluc Negative / Ketone Negative  / Bili Negative / Urobili Negative       Blood Negative / Protein Negative / Leuk Est Negative / Nitrite Negative      RBC  / WBC  / Hyaline  / Gran  / Sq Epi  / Non Sq Epi  / Bacteria     Urine Creatinine 10      [09-18-19 @ 15:36]  Urine Sodium <30      [09-18-19 @ 15:36]  Urine Chloride <27      [09-18-19 @ 15:36]  Urine Osmolality 67      [09-18-19 @ 15:36]    HbA1c 5.6      [04-06-19 @ 08:22]  TSH 1.99      [09-18-19 @ 19:47]  Lipid: chol 132, TG 83, HDL 51, LDL 76      [04-06-19 @ 08:22]    HCV 0.13, Nonreact      [04-02-19 @ 17:32]

## 2019-09-19 NOTE — PROGRESS NOTE ADULT - ASSESSMENT
65 y/o Syriac speaking M (poor historian), he has Hx of schizophrenia who presented to the ED, escorted by [employer], w/ agitation. Sent in by his . Pt states that he was at home cooking in his kitchen, and the next thing he knew he was being brought to the hospital by the police, Pt found to be hyponatremic, Na 116. MICU consult called to evaluate pt for hyponatremia (previously admitted to [facility] this year for hyponatremia). In the ED, pt was started on 2% NaCl infusion per nephro (Dr. 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     Plan:   Hyponatremia likely sec to primary polydipsia: Will continued with monitoring, monitor BMP Q12h, will continue with fluids restriction, will follow serum and urine osmolality as well.       Schizophrenia/Bipolar disorder: Psych has been consulted, will continue with clonpin for anxiety, will follow Psych team.      HTN: Will continued with lisnipril, will monitor BP and adjust meds if needed.     Stomach upset: likely GERD, will start protonix.    DVT prophylaxis: Lovenox sc.

## 2019-09-19 NOTE — ED ADULT NURSE NOTE - BREATHING, MLM
Spontaneous, unlabored and symmetrical
No - the patient is unable to be screened due to medical condition

## 2019-09-19 NOTE — CONSULT NOTE ADULT - ASSESSMENT
1. 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 Schizophrenia    Restrict PO fluids to 2 L/Daily x 24 hrs  D/C D5W post 24 hrs from first Na+ level then let autocorrect x 24 hrs  Trend BMP  Psych consult  Will follow

## 2019-09-19 NOTE — PROGRESS NOTE ADULT - PROBLEM SELECTOR PLAN 1
Initial Na 116, started on 2% NaCl @ 60cc's/hr. Na corrected to 126 after 6 1/2 hours. 2% d/c'd, started on 75cc/hr of D5W, repeat BMP with Na 127, increased D5W to 125cc's/hr. Recheck BMP @ 0400. Initial Na 116, started on 2% NaCl @ 60cc's/hr. Na corrected to 126 after 6 1/2 hours. 2% d/c'd, started on 75cc/hr of D5W, repeat BMP with Na 127, increased D5W to 125cc's/hr. Recheck BMP @ 0400. PO fluid restriction.

## 2019-09-19 NOTE — SWALLOW BEDSIDE ASSESSMENT ADULT - COMMENTS
As per MD note:  "63 y/o Brazilian speaking M (poor historian) w/ a PMHx of schizophrenia who presented to the ED, escorted by [employer], w/ agitation. Sent in by his . Pt states that he was at home cooking in his kitchen, and the next thing he knew he was being brought to the hospital by the police. According to pt's , pt has not been taking prescribed haldol. On arrival to the ED, pt was spitting and using foul language w/ staff. Pt found to be hyponatremic, Na 116. MICU consult called to evaluate pt for hyponatremia (previously admitted to [facility] this year for hyponatremia). In the ED, pt was started on 2% NaCl infusion per nephro (Dr. Choudhary) recs. Pt states that he does not know why he was brought to the hospital. At this time, pt only endorses burning pain in his stomach. Pt states that he does not think he is schizophrenic, and that the medicine he is prescribed makes his head feel huge. Pt denies hearing voices. According to pt's medication record, he takes NaCl tabs at home, but pt is unaware as to why he is prescribed them. Denies fever, chills, chest pain, SOB, headache, N/V/D"

## 2019-09-19 NOTE — PROGRESS NOTE ADULT - SUBJECTIVE AND OBJECTIVE BOX
CANDICE LESTER    [id number]    64y      Male    Patient is a 64y old  Male who presents with a chief complaint of Hyponatremia (19 Sep 2019 14:21)      INTERVAL HPI/OVERNIGHT EVENTS:    Patient is hallucinating, seeing snakes in his stools, he feels his neighbor are poisoning his water and food that why he has those snakes in the stool, he denies having chest pain, nausea, vomiting or pain.      REVIEW OF SYSTEMS:    limited as he is talking more about the hallucinations.       Vital Signs Last 24 Hrs  T(C): 36.5 (19 Sep 2019 17:18), Max: 36.8 (19 Sep 2019 16:33)  T(F): 97.7 (19 Sep 2019 17:18), Max: 98.2 (19 Sep 2019 16:33)  HR: 67 (19 Sep 2019 17:18) (50 - 101)  BP: 132/78 (19 Sep 2019 17:41) (91/50 - 162/89)  BP(mean): 70 (19 Sep 2019 15:00) (66 - 94)  RR: 19 (19 Sep 2019 17:18) (12 - 51)  SpO2: 98% (19 Sep 2019 17:18) (96% - 100%)    PHYSICAL EXAM:    GENERAL: Middle age male looking anxious and psychotic   HEENT: atraumatic  NECK: soft, Supple, No JVD,   CHEST/LUNG: decrease air entry bilaterally; No wheezing  HEART: S1S2+, Regular rate and rhythm; No murmurs  ABDOMEN: Soft, Nontender, Nondistended; Bowel sounds present  EXTREMITIES:  1+ Peripheral Pulses, No edema  SKIN: No rashes or lesions  NEURO: OX2, moving all his extremities, no facial droops  PSYCH: Anxious and Psychotic and some paranoid behaviour.       LABS:                        14.8   6.58  )-----------( 337      ( 19 Sep 2019 05:18 )             41.0     09-19    122<L>  |  89<L>  |  9.0  ----------------------------<  94  4.8   |  24.0  |  0.99    Ca    9.0      19 Sep 2019 20:19  Phos  2.7     -  Mg     2.1     -    TPro  7.0  /  Alb  4.2  /  TBili  0.4  /  DBili  x   /  AST  30  /  ALT  27  /  AlkPhos  61        Urinalysis Basic - ( 18 Sep 2019 15:37 )    Color: Yellow / Appearance: Clear / S.005 / pH: x  Gluc: x / Ketone: Negative  / Bili: Negative / Urobili: Negative mg/dL   Blood: x / Protein: Negative mg/dL / Nitrite: Negative   Leuk Esterase: Negative / RBC: x / WBC x   Sq Epi: x / Non Sq Epi: x / Bacteria: x          I&O's Summary    18 Sep 2019 07:  -  19 Sep 2019 07:00  --------------------------------------------------------  IN: 3005 mL / OUT: 2300 mL / NET: 705 mL    19 Sep 2019 07:  -  19 Sep 2019 22:34  --------------------------------------------------------  IN: 650 mL / OUT: 650 mL / NET: 0 mL        MEDICATIONS  (STANDING):  amLODIPine   Tablet 10 milliGRAM(s) Oral daily  chlorhexidine 2% Cloths 1 Application(s) Topical <User Schedule>  clonazePAM  Tablet 2 milliGRAM(s) Oral at bedtime  enoxaparin Injectable 40 milliGRAM(s) SubCutaneous daily  lisinopril 5 milliGRAM(s) Oral daily    MEDICATIONS  (PRN):  aluminum hydroxide/magnesium hydroxide/simethicone Suspension 30 milliLiter(s) Oral every 4 hours PRN Dyspepsia

## 2019-09-19 NOTE — SWALLOW BEDSIDE ASSESSMENT ADULT - SWALLOW EVAL: DIAGNOSIS
Oral & pharyngeal stage of swallow clinically unremarkable with no overt s/s pentration/aspiration for given PO

## 2019-09-19 NOTE — PROGRESS NOTE ADULT - SUBJECTIVE AND OBJECTIVE BOX
64y  Male  No Known Allergies    Patient is a 64y old  Male who presents with a chief complaint of Hyponatremia     HPI:    Pt is a 63 y/o Nicaraguan speaking male with PMHx of Schizophrenia who presented yesterday       Pt is a 63 y/o Nicaraguan speaking M (poor historian) w/ a PMHx of schizophrenia who presented to the ED, escorted by [employer], w/ agitation. Sent in by his . Pt states that he was at home cooking in his kitchen, and the next thing he knew he was being brought to the hospital by the police. According to pt's , pt has not been taking prescribed haldol. On arrival to the ED, pt was spitting and using foul language w/ staff. Pt found to be hyponatremic, Na 116. MICU consult called to evaluate pt for hyponatremia (previously admitted to [facility] this year for hyponatremia). In the ED, pt was started on 2% NaCl infusion per nephro (Dr. Choudhary) akiko. Pt states that he does not know why he was brought to the hospital. At this time, pt only endorses burning pain in his stomach. Pt states that he does not think he is schizophrenic, and that the medicine he is prescribed makes his head feel huge. Pt denies hearing voices. According to pt's medication record, he takes NaCl tabs at home, but pt is unaware as to why he is prescribed them. Denies fever, chills, chest pain, SOB, headache, N/V/D.     Events Last 24 Hours:   Repeat BMP revealed Na of 126, increased from 116 over 6 1/2 hours    PAST MEDICAL & SURGICAL HISTORY:  Poor historian:   bipolar disorder  Schizophrenia   GERD  Chronic hyponatremia   HTN  Bipolar 1 disorder  Bipolar 1 disorder  Other schizophrenia  No significant past surgical history    FAMILY HISTORY:  Family history of diabetes mellitus (DM)  No pertinent family history in first degree relatives    Vitals   Vital Signs Last 24 Hrs  T(C): 36.3 (18 Sep 2019 21:33), Max: 37.1 (18 Sep 2019 12:53)  T(F): 97.3 (18 Sep 2019 21:33), Max: 98.7 (18 Sep 2019 12:53)  HR: 72 (19 Sep 2019 00:00) (46 - 95)  BP: 121/58 (19 Sep 2019 00:00) (94/50 - 164/75)  BP(mean): 84 (19 Sep 2019 00:00) (68 - 108)  RR: 51 (19 Sep 2019 00:00) (12 - 51)  SpO2: 99% (19 Sep 2019 00:00) (97% - 100%)    I&O's Summary    18 Sep 2019 07:01  -  19 Sep 2019 01:36  --------------------------------------------------------  IN: 540 mL / OUT: 700 mL / NET: -160 mL    LABS      127<L>  |  91<L>  |  7.0<L>  ----------------------------<  113  4.4   |  25.0  |  0.87    Ca    9.4      19 Sep 2019 00:59  Phos  3.3       Mg     2.1         TPro  7.0  /  Alb  4.2  /  TBili  0.4  /  DBili  x   /  AST  30  /  ALT  27  /  AlkPhos  61                            13.0   6.32  )-----------( 292      ( 18 Sep 2019 13:19 )             35.7     LIVER FUNCTIONS - ( 18 Sep 2019 13:19 )  Alb: 4.2 g/dL / Pro: 7.0 g/dL / ALK PHOS: 61 U/L / ALT: 27 U/L / AST: 30 U/L / GGT: x           CAPILLARY BLOOD GLUCOSE    Urinalysis Basic - ( 18 Sep 2019 15:37 )  Color: Yellow / Appearance: Clear / S.005 / pH: x  Gluc: x / Ketone: Negative  / Bili: Negative / Urobili: Negative mg/dL   Blood: x / Protein: Negative mg/dL / Nitrite: Negative   Leuk Esterase: Negative / RBC: x / WBC x   Sq Epi: x / Non Sq Epi: x / Bacteria: x    Meds  MEDICATIONS  (STANDING):  amLODIPine   Tablet 10 milliGRAM(s) Oral daily  chlorhexidine 2% Cloths 1 Application(s) Topical <User Schedule>  clonazePAM  Tablet 0.5 milliGRAM(s) Oral at bedtime  dextrose 5%. 1000 milliLiter(s) (75 mL/Hr) IV Continuous <Continuous>  enoxaparin Injectable 40 milliGRAM(s) SubCutaneous daily  lisinopril 5 milliGRAM(s) Oral daily    REVIEW OF SYSTEMS:  Negative except as per HPI    Physicial Exam:   Constitutional: NAD, well-groomed, well-developed  HEENT: PERRLA, EOMI, no drainage or redness  Neck: No bruits; no thyromegaly or nodules,  No JVD  Back: Normal spine flexure, No CVA tenderness, No deformity or limitation of movement  Respiratory: Breath Sounds equal & clear to percussion & auscultation, no accessory muscle use  Cardiovascular: Regular rate & rhythm, normal S1, S2; no murmurs, gallops or rubs; no S3, S4  Gastrointestinal: Soft, non-tender, non distended no hepatosplenomegaly, normal bowel sounds  Extremities: No peripheral edema, No cyanosis, clubbing   Vascular: Equal and normal pulses: 2+ peripheral pulses throughout  Neurological: GCS:    A&O x 3; no sensory, motor  deficits, normal reflexes  Psychiatric: Normal mood, normal affect  Musculoskeletal: No joint pain, swelling or deformity; no limitation of movement  Skin: No rashes 64y  Male  No Known Allergies    Patient is a 64y old  Male who presents with a chief complaint of Hyponatremia     HPI:  Pt is a 65 y/o Latvian speaking male with PMHx of Schizophrenia who presented yesterday escorted by [employer] due to agitation. In ED, pt found to be hyponatremic, Na of 116, subsequently admitted to MICU. S/p NS bolus in ED, renal consulted, started on 2% NaCl @60cc/hr.    Events Last 24 Hours:   Repeat BMP revealed Na of 126, increased from 116 over 6 1/2 hours on 2% NaCl @60cc/hr. Off 2% NaCl, transitioned to D5W. Pt awake, alert, oriented, but remains delusional and at times talks to himself/sees things.     PAST MEDICAL & SURGICAL HISTORY:  Poor historian:   bipolar disorder  Schizophrenia   GERD  Chronic hyponatremia   HTN  Bipolar 1 disorder  Bipolar 1 disorder  Other schizophrenia  No significant past surgical history    FAMILY HISTORY:  Family history of diabetes mellitus (DM)  No pertinent family history in first degree relatives    Vitals   Vital Signs Last 24 Hrs  T(C): 36.3 (18 Sep 2019 21:33), Max: 37.1 (18 Sep 2019 12:53)  T(F): 97.3 (18 Sep 2019 21:33), Max: 98.7 (18 Sep 2019 12:53)  HR: 72 (19 Sep 2019 00:00) (46 - 95)  BP: 121/58 (19 Sep 2019 00:00) (94/50 - 164/75)  BP(mean): 84 (19 Sep 2019 00:00) (68 - 108)  RR: 51 (19 Sep 2019 00:00) (12 - 51)  SpO2: 99% (19 Sep 2019 00:00) (97% - 100%)    I&O's Summary    18 Sep 2019 07:01  -  19 Sep 2019 01:36  --------------------------------------------------------  IN: 540 mL / OUT: 700 mL / NET: -160 mL    LABS      127<L>  |  91<L>  |  7.0<L>  ----------------------------<  113  4.4   |  25.0  |  0.87    Ca    9.4      19 Sep 2019 00:59  Phos  3.3       Mg     2.1         TPro  7.0  /  Alb  4.2  /  TBili  0.4  /  DBili  x   /  AST  30  /  ALT  27  /  AlkPhos  61                            13.0   6.32  )-----------( 292      ( 18 Sep 2019 13:19 )             35.7     LIVER FUNCTIONS - ( 18 Sep 2019 13:19 )  Alb: 4.2 g/dL / Pro: 7.0 g/dL / ALK PHOS: 61 U/L / ALT: 27 U/L / AST: 30 U/L / GGT: x           CAPILLARY BLOOD GLUCOSE    Urinalysis Basic - ( 18 Sep 2019 15:37 )  Color: Yellow / Appearance: Clear / S.005 / pH: x  Gluc: x / Ketone: Negative  / Bili: Negative / Urobili: Negative mg/dL   Blood: x / Protein: Negative mg/dL / Nitrite: Negative   Leuk Esterase: Negative / RBC: x / WBC x   Sq Epi: x / Non Sq Epi: x / Bacteria: x    Meds  MEDICATIONS  (STANDING):  amLODIPine   Tablet 10 milliGRAM(s) Oral daily  chlorhexidine 2% Cloths 1 Application(s) Topical <User Schedule>  clonazePAM  Tablet 0.5 milliGRAM(s) Oral at bedtime  dextrose 5%. 1000 milliLiter(s) (75 mL/Hr) IV Continuous <Continuous>  enoxaparin Injectable 40 milliGRAM(s) SubCutaneous daily  lisinopril 5 milliGRAM(s) Oral daily    REVIEW OF SYSTEMS:  Negative except as per HPI    Physicial Exam:   Constitutional: NAD, well-appearing, lying in bed  HEENT: PERRLA, EOMI, no drainage or redness  Respiratory: Breath Sounds equal & clear to percussion & auscultation, no accessory muscle use  Cardiovascular: Sinus bradycardia 50-70's, normal S1, S2; no murmurs  Gastrointestinal: Soft, non-tender, non distended, normal bowel sounds  Extremities: No peripheral edema, No cyanosis, clubbing   Vascular: Equal and normal pulses: 2+ peripheral pulses throughout  Neurological: A&O x 3; no sensory, motor  deficits, normal reflexes  Musculoskeletal: No joint pain, swelling or deformity; no limitation of movement  Skin: No rashes      Time spent: 30 mins assessing presenting problems of acute illness that poses high probability  end organ damage/dysfunction.  Medical decision making inculding plan of daily care, reviewing data, reviewing radiology, discussing with multidisciplinary team, non inclusive of procedures, discussing goals of care with patient/family

## 2019-09-19 NOTE — CHART NOTE - NSCHARTNOTEFT_GEN_A_CORE
Pt acutely agitated, throwing sheets and robe off, not attacking staff but wishes for food/ginger-purvi and anxiety meds.   On 2mg Klonepin at home, will give home dose now. Restart 2mg Klonepin daily at night. Can start diet, but continue with fluid   restriction.

## 2019-09-19 NOTE — PROGRESS NOTE ADULT - PROBLEM SELECTOR PLAN 2
On Fluphenazine @home. Will hold in setting of hyponatremia, awaiting psych consult in AM before restarted home psych meds.

## 2019-09-20 LAB
OSMOLALITY UR: 119 MOSM/KG — LOW (ref 300–1000)
POTASSIUM UR-SCNC: 8 MMOL/L — SIGNIFICANT CHANGE UP
SODIUM UR-SCNC: <30 MMOL/L — SIGNIFICANT CHANGE UP

## 2019-09-20 PROCEDURE — 99223 1ST HOSP IP/OBS HIGH 75: CPT

## 2019-09-20 PROCEDURE — 99233 SBSQ HOSP IP/OBS HIGH 50: CPT | Mod: GC

## 2019-09-20 PROCEDURE — 99233 SBSQ HOSP IP/OBS HIGH 50: CPT

## 2019-09-20 RX ORDER — DIVALPROEX SODIUM 500 MG/1
500 TABLET, DELAYED RELEASE ORAL THREE TIMES A DAY
Refills: 0 | Status: DISCONTINUED | OUTPATIENT
Start: 2019-09-20 | End: 2019-09-26

## 2019-09-20 RX ORDER — FLUPHENAZINE HYDROCHLORIDE 1 MG/1
5 TABLET, FILM COATED ORAL
Refills: 0 | Status: DISCONTINUED | OUTPATIENT
Start: 2019-09-20 | End: 2019-09-26

## 2019-09-20 RX ORDER — BENZTROPINE MESYLATE 1 MG
1 TABLET ORAL
Refills: 0 | Status: DISCONTINUED | OUTPATIENT
Start: 2019-09-20 | End: 2019-09-26

## 2019-09-20 RX ADMIN — Medication 2 MILLIGRAM(S): at 21:08

## 2019-09-20 RX ADMIN — DIVALPROEX SODIUM 500 MILLIGRAM(S): 500 TABLET, DELAYED RELEASE ORAL at 14:24

## 2019-09-20 RX ADMIN — Medication 30 MILLILITER(S): at 10:12

## 2019-09-20 RX ADMIN — ENOXAPARIN SODIUM 40 MILLIGRAM(S): 100 INJECTION SUBCUTANEOUS at 13:08

## 2019-09-20 RX ADMIN — FLUPHENAZINE HYDROCHLORIDE 5 MILLIGRAM(S): 1 TABLET, FILM COATED ORAL at 16:56

## 2019-09-20 RX ADMIN — DIVALPROEX SODIUM 500 MILLIGRAM(S): 500 TABLET, DELAYED RELEASE ORAL at 21:08

## 2019-09-20 RX ADMIN — Medication 1 MILLIGRAM(S): at 16:56

## 2019-09-20 RX ADMIN — CHLORHEXIDINE GLUCONATE 1 APPLICATION(S): 213 SOLUTION TOPICAL at 06:31

## 2019-09-20 RX ADMIN — AMLODIPINE BESYLATE 10 MILLIGRAM(S): 2.5 TABLET ORAL at 06:30

## 2019-09-20 RX ADMIN — LISINOPRIL 5 MILLIGRAM(S): 2.5 TABLET ORAL at 06:31

## 2019-09-20 NOTE — BEHAVIORAL HEALTH ASSESSMENT NOTE - SUMMARY
65 y/o male, domiciled alone, non caregiver, unemployed, with PPH of Bipolar disorder with multiple prior psychiatric hospitalizations; no known suicide attempts no active substance abuse or known history of complicated withdrawal; PMHx of GERD and recurrent hyponatremia. Patient was brought to ER for agitation.  Patient has been non compliant with medications at home. He is paranoid and irritable, feeling he isnt safe. He c/o thought insertion. Denies any active S/H I/I/P. Awaiting collateral from daughter and ACT team. 65 y/o male, domiciled alone, non caregiver, unemployed, with PPH of Bipolar disorder with multiple prior psychiatric hospitalizations; no known suicide attempts no active substance abuse or known history of complicated withdrawal; PMHx of GERD and recurrent hyponatremia. Patient was brought to ER for agitation.  Patient has been non compliant with medications at home. He is paranoid and irritable, feeling he isnt safe. He c/o thought insertion. Denies any active S/H I/I/P. Awaiting collateral from daughter and ACT team. Unable to contact at this time.    Patient may require inpatient psychiatric stabilization when medically cleared.

## 2019-09-20 NOTE — BEHAVIORAL HEALTH ASSESSMENT NOTE - NSBHMEDSOTHERFT_PSY_A_CORE
as per [facility], last filled 6/2019:  fluphenazine 10mg q12  depakote 1000mg q12  clonazepam 2mg qhs  benztropine 1mg bid

## 2019-09-20 NOTE — BEHAVIORAL HEALTH ASSESSMENT NOTE - NSBHCONSULTMEDS_PSY_A_CORE FT
restart depakote 500mg tid, fluphenazine 10mg q12h, cogentin 1mg bid  continue clonazepam 2mg qhs    draw valproic acid level restart depakote 500mg tid, fluphenazine 10mg bid, cogentin 1mg bid  continue clonazepam 2mg qhs    draw valproic acid level

## 2019-09-20 NOTE — BEHAVIORAL HEALTH ASSESSMENT NOTE - HPI (INCLUDE ILLNESS QUALITY, SEVERITY, DURATION, TIMING, CONTEXT, MODIFYING FACTORS, ASSOCIATED SIGNS AND SYMPTOMS)
63 y/o male, domiciled alone, non caregiver, unemployed, with PPH of Bipolar disorder with multiple prior psychiatric hospitalizations; no known suicide attempts no active substance abuse or known history of complicated withdrawal; PMHx of GERD and recurrent hyponatremia.  Patient has hyponatremia now.  Patient was brought to ER for agitation, apparently activated by his SW. While in ED was aggressive. No substances on tox screen.         Patient spoke to with . He reports not taking medications for unknown period of time because "im not crazy, its everyone else". He reports that it is dangerous where he lives because people want to harm him. He also states that people want to harm him in the hospital and they may be poisoning the water. He denies any desire to hurt himself or others but does not feel safe in general. He denies access to weapons. He reports sleeping and eating well at home. He denies AVH but does reports that others put thoughts into his head. He speaks circumstantially and acts generally hostile. He requests a medication for nerves but refuses most antipsychotic med offers. Attention and concentration are fair; he is a&ox3; does not appear delirious. Does not appear internally preoccupied.

## 2019-09-20 NOTE — PROGRESS NOTE ADULT - SUBJECTIVE AND OBJECTIVE BOX
[facility] DIVISION OF KIDNEY DISEASES AND HYPERTENSION -- FOLLOW UP NOTE  --------------------------------------------------------------------------------  Chief Complaint: Hyponatremia    24 hour events/subjective:  Seen/examined  Na 122; got D5 yesterday;        PAST HISTORY  --------------------------------------------------------------------------------  No significant changes to PMH, PSH, FHx, SHx, unless otherwise noted    ALLERGIES & MEDICATIONS  --------------------------------------------------------------------------------  Allergies    No Known Allergies    Intolerances      Standing Inpatient Medications  amLODIPine   Tablet 10 milliGRAM(s) Oral daily  chlorhexidine 2% Cloths 1 Application(s) Topical <User Schedule>  clonazePAM  Tablet 2 milliGRAM(s) Oral at bedtime  enoxaparin Injectable 40 milliGRAM(s) SubCutaneous daily  lisinopril 5 milliGRAM(s) Oral daily    PRN Inpatient Medications  aluminum hydroxide/magnesium hydroxide/simethicone Suspension 30 milliLiter(s) Oral every 4 hours PRN      REVIEW OF SYSTEMS  --------------------------------------------------------------------------------  Unable to obtain    VITALS/PHYSICAL EXAM  --------------------------------------------------------------------------------  T(C): 36.5 (09-20-19 @ 09:43), Max: 36.8 (09-19-19 @ 16:33)  HR: 68 (09-20-19 @ 09:43) (53 - 72)  BP: 146/91 (09-20-19 @ 09:43) (93/52 - 162/89)  RR: 19 (09-20-19 @ 09:43) (14 - 31)  SpO2: 97% (09-20-19 @ 09:43) (96% - 100%)  Wt(kg): --  Height (cm): 162.6 (09-18-19 @ 16:28)  Weight (kg): 75.9 (09-18-19 @ 16:28)  BMI (kg/m2): 28.7 (09-18-19 @ 16:28)  BSA (m2): 1.81 (09-18-19 @ 16:28)      09-19-19 @ 07:01  -  09-20-19 @ 07:00  --------------------------------------------------------  IN: 650 mL / OUT: 650 mL / NET: 0 mL      Physical Exam:  	Gen: NAD, well-appearing  	HEENT: PERRL, supple neck, clear oropharynx  	Pulm: CTA B/L  	CV: RRR, S1S2; no rub  	Back: No spinal or CVA tenderness; no sacral edema  	Abd: +BS, soft, nontender/nondistended  	: No suprapubic tenderness  	UE: Warm, FROM, no clubbing, intact strength; no edema; no asterixis  	LE: Warm, FROM, no clubbing, intact strength; no edema  	Neuro: No focal deficits, intact gait  	Psych: Normal affect and mood  	Skin: Warm, without rashes  	Vascular access:    LABS/STUDIES  --------------------------------------------------------------------------------              14.8   6.58  >-----------<  337      [09-19-19 @ 05:18]              41.0     122  |  89  |  9.0  ----------------------------<  94      [09-19-19 @ 20:19]  4.8   |  24.0  |  0.99        Ca     9.0     [09-19-19 @ 20:19]      Mg     2.1     [09-19-19 @ 05:18]      Phos  2.7     [09-19-19 @ 05:18]    TPro  7.0  /  Alb  4.2  /  TBili  0.4  /  DBili  x   /  AST  30  /  ALT  27  /  AlkPhos  61  [09-18-19 @ 13:19]        Uric acid 4.5      [09-18-19 @ 19:47]  Serum Osmolality 267      [09-18-19 @ 19:48]    Creatinine Trend:  SCr 0.99 [09-19 @ 20:19]  SCr 0.81 [09-19 @ 12:35]  SCr 0.71 [09-19 @ 05:18]  SCr 0.87 [09-19 @ 00:59]  SCr 0.80 [09-18 @ 19:46]    Urinalysis - [09-18-19 @ 15:37]      Color Yellow / Appearance Clear / SG 1.005 / pH 7.0      Gluc Negative / Ketone Negative  / Bili Negative / Urobili Negative       Blood Negative / Protein Negative / Leuk Est Negative / Nitrite Negative      RBC  / WBC  / Hyaline  / Gran  / Sq Epi  / Non Sq Epi  / Bacteria     Urine Creatinine 10      [09-18-19 @ 15:36]  Urine Sodium <30      [09-20-19 @ 06:26]  Urine Potassium 8      [09-20-19 @ 06:26]  Urine Chloride <27      [09-18-19 @ 15:36]  Urine Osmolality 119      [09-20-19 @ 06:26]    HbA1c 5.6      [04-06-19 @ 08:22]  TSH 1.99      [09-18-19 @ 19:47]  Lipid: chol 132, TG 83, HDL 51, LDL 76      [04-06-19 @ 08:22]

## 2019-09-20 NOTE — PROGRESS NOTE ADULT - ASSESSMENT
64 year old male with PMH Schizophrenia, Bipolar disorder, GERD and HTN is admitted with hyponatremia (SNa 116) secondary to psychogenic polydipsia.    Hyponatremia, interval improvement since admission  - Continue to monitor  - Fluid restriction    Schizophrenia/Bipolar  - Continue Psychotropics     HTN  - Continue ACEI, CCB    GERD  - Continue MOM PRN    Prophylactic measure  - LMWH for VTEp      Disposition - pending SNa improvement. May possible need psych admission for stabilization of underlying psychiatric illness>      Discussed with Dr Luna and AFIA Gill

## 2019-09-20 NOTE — PROGRESS NOTE ADULT - ASSESSMENT
1. Hyponatremia  2. Schizophrenia  3. Psychogenic Polydipsia  4. HTN    Needs strict 1L fluid restriction  Can consider salt tabs 1gm TID   Continue antihypertensives; lisinopril/amlodipine  Will follow  do not increase more than 0.5meq / hr Na; check BMP q8h  d/w Dr Conley

## 2019-09-20 NOTE — PROGRESS NOTE ADULT - SUBJECTIVE AND OBJECTIVE BOX
HOSPITALIST PROGRESS NOTE    CANDICE LESTER  [id number]  64yMale    Patient is a 64y old  Male who presents with a chief complaint of Hyponatremia (20 Sep 2019 11:35)      SUBJECTIVE:   Chart reviewed since admission, discussed with Dr Shook  Patient seen and examined at bedside for hyponatremia, HTN, GERD, Schizophrenia  Denies any dizziness, headache, diplopia, paresthesia.    Patient laughs uncontrollably from time to time during interview.      OBJECTIVE:  Vital Signs Last 24 Hrs  T(C): 36.8 (20 Sep 2019 15:51), Max: 36.8 (20 Sep 2019 15:51)  T(F): 98.2 (20 Sep 2019 15:51), Max: 98.2 (20 Sep 2019 15:51)  HR: 70 (20 Sep 2019 15:51) (54 - 70)  BP: 107/57 (20 Sep 2019 15:51) (107/57 - 146/91)  BP(mean): 98 (20 Sep 2019 00:31) (98 - 98)  RR: 18 (20 Sep 2019 15:51) (18 - 19)  SpO2: 98% (20 Sep 2019 15:51) (97% - 98%)    PHYSICAL EXAMINATION  General: Sitting in bed, NAD  HEENT:  extraocular movements intact, poor dentition  NECK:  Supple  CVS: RRR S1S2   RESP:  CTAB  GI:  Soft nondistended nontender BS+  : No suprapubic tenderness  MSK:  FROM   CNS:  No gross focal or global deficit noted. Follows commands  INTEG:  Fair warm dry skin  PSYCH:  Fair mood, laughs inappropriately. Thinks he's home, president is kasia knows date.    MONITOR:  CAPILLARY BLOOD GLUCOSE            I&O's Summary    19 Sep 2019 07:01  -  20 Sep 2019 07:00  --------------------------------------------------------  IN: 650 mL / OUT: 650 mL / NET: 0 mL                            14.8   6.58  )-----------( 337      ( 19 Sep 2019 05:18 )             41.0       09-19    122<L>  |  89<L>  |  9.0  ----------------------------<  94  4.8   |  24.0  |  0.99    Ca    9.0      19 Sep 2019 20:19  Phos  2.7     09-19  Mg     2.1     09-19              Culture:    TTE:    RADIOLOGY        MEDICATIONS  (STANDING):  amLODIPine   Tablet 10 milliGRAM(s) Oral daily  benztropine 1 milliGRAM(s) Oral two times a day  chlorhexidine 2% Cloths 1 Application(s) Topical <User Schedule>  clonazePAM  Tablet 2 milliGRAM(s) Oral at bedtime  diVALproex  milliGRAM(s) Oral three times a day  enoxaparin Injectable 40 milliGRAM(s) SubCutaneous daily  fluPHENAZine 5 milliGRAM(s) Oral two times a day  lisinopril 5 milliGRAM(s) Oral daily      MEDICATIONS  (PRN):  aluminum hydroxide/magnesium hydroxide/simethicone Suspension 30 milliLiter(s) Oral every 4 hours PRN Dyspepsia

## 2019-09-21 LAB
ALBUMIN SERPL ELPH-MCNC: 4 G/DL — SIGNIFICANT CHANGE UP (ref 3.3–5.2)
ANION GAP SERPL CALC-SCNC: 12 MMOL/L — SIGNIFICANT CHANGE UP (ref 5–17)
ANION GAP SERPL CALC-SCNC: 9 MMOL/L — SIGNIFICANT CHANGE UP (ref 5–17)
APTT BLD: 32.2 SEC — SIGNIFICANT CHANGE UP (ref 27.5–36.3)
BUN SERPL-MCNC: 14 MG/DL — SIGNIFICANT CHANGE UP (ref 8–20)
BUN SERPL-MCNC: 15 MG/DL — SIGNIFICANT CHANGE UP (ref 8–20)
CALCIUM SERPL-MCNC: 9.8 MG/DL — SIGNIFICANT CHANGE UP (ref 8.6–10.2)
CALCIUM SERPL-MCNC: 9.9 MG/DL — SIGNIFICANT CHANGE UP (ref 8.6–10.2)
CHLORIDE SERPL-SCNC: 91 MMOL/L — LOW (ref 98–107)
CHLORIDE SERPL-SCNC: 91 MMOL/L — LOW (ref 98–107)
CO2 SERPL-SCNC: 24 MMOL/L — SIGNIFICANT CHANGE UP (ref 22–29)
CO2 SERPL-SCNC: 26 MMOL/L — SIGNIFICANT CHANGE UP (ref 22–29)
CREAT SERPL-MCNC: 0.91 MG/DL — SIGNIFICANT CHANGE UP (ref 0.5–1.3)
CREAT SERPL-MCNC: 0.99 MG/DL — SIGNIFICANT CHANGE UP (ref 0.5–1.3)
GLUCOSE SERPL-MCNC: 93 MG/DL — SIGNIFICANT CHANGE UP (ref 70–115)
GLUCOSE SERPL-MCNC: 96 MG/DL — SIGNIFICANT CHANGE UP (ref 70–115)
HCT VFR BLD CALC: 42.2 % — SIGNIFICANT CHANGE UP (ref 39–50)
HGB BLD-MCNC: 14.7 G/DL — SIGNIFICANT CHANGE UP (ref 13–17)
INR BLD: 1 RATIO — SIGNIFICANT CHANGE UP (ref 0.88–1.16)
MAGNESIUM SERPL-MCNC: 2.4 MG/DL — SIGNIFICANT CHANGE UP (ref 1.6–2.6)
MCHC RBC-ENTMCNC: 28.8 PG — SIGNIFICANT CHANGE UP (ref 27–34)
MCHC RBC-ENTMCNC: 34.8 GM/DL — SIGNIFICANT CHANGE UP (ref 32–36)
MCV RBC AUTO: 82.7 FL — SIGNIFICANT CHANGE UP (ref 80–100)
OSMOLALITY SERPL: 283 MOSMOL/KG — SIGNIFICANT CHANGE UP (ref 280–301)
OSMOLALITY UR: 303 MOSM/KG — SIGNIFICANT CHANGE UP (ref 300–1000)
PHOSPHATE SERPL-MCNC: 3.4 MG/DL — SIGNIFICANT CHANGE UP (ref 2.4–4.7)
PHOSPHATE SERPL-MCNC: 3.7 MG/DL — SIGNIFICANT CHANGE UP (ref 2.4–4.7)
PLATELET # BLD AUTO: 333 K/UL — SIGNIFICANT CHANGE UP (ref 150–400)
POTASSIUM SERPL-MCNC: 5.2 MMOL/L — SIGNIFICANT CHANGE UP (ref 3.5–5.3)
POTASSIUM SERPL-MCNC: 5.2 MMOL/L — SIGNIFICANT CHANGE UP (ref 3.5–5.3)
POTASSIUM SERPL-SCNC: 5.2 MMOL/L — SIGNIFICANT CHANGE UP (ref 3.5–5.3)
POTASSIUM SERPL-SCNC: 5.2 MMOL/L — SIGNIFICANT CHANGE UP (ref 3.5–5.3)
POTASSIUM UR-SCNC: 36 MMOL/L — SIGNIFICANT CHANGE UP
PROTHROM AB SERPL-ACNC: 11.5 SEC — SIGNIFICANT CHANGE UP (ref 10–12.9)
RBC # BLD: 5.1 M/UL — SIGNIFICANT CHANGE UP (ref 4.2–5.8)
RBC # FLD: 13.3 % — SIGNIFICANT CHANGE UP (ref 10.3–14.5)
SODIUM SERPL-SCNC: 126 MMOL/L — LOW (ref 135–145)
SODIUM SERPL-SCNC: 127 MMOL/L — LOW (ref 135–145)
SODIUM UR-SCNC: <30 MMOL/L — SIGNIFICANT CHANGE UP
VALPROATE SERPL-MCNC: 63.5 UG/ML — SIGNIFICANT CHANGE UP (ref 50–100)
WBC # BLD: 5.68 K/UL — SIGNIFICANT CHANGE UP (ref 3.8–10.5)
WBC # FLD AUTO: 5.68 K/UL — SIGNIFICANT CHANGE UP (ref 3.8–10.5)

## 2019-09-21 PROCEDURE — 99232 SBSQ HOSP IP/OBS MODERATE 35: CPT

## 2019-09-21 PROCEDURE — 99233 SBSQ HOSP IP/OBS HIGH 50: CPT

## 2019-09-21 RX ADMIN — Medication 2 MILLIGRAM(S): at 21:58

## 2019-09-21 RX ADMIN — CHLORHEXIDINE GLUCONATE 1 APPLICATION(S): 213 SOLUTION TOPICAL at 06:16

## 2019-09-21 RX ADMIN — LISINOPRIL 5 MILLIGRAM(S): 2.5 TABLET ORAL at 06:15

## 2019-09-21 RX ADMIN — DIVALPROEX SODIUM 500 MILLIGRAM(S): 500 TABLET, DELAYED RELEASE ORAL at 06:15

## 2019-09-21 RX ADMIN — FLUPHENAZINE HYDROCHLORIDE 5 MILLIGRAM(S): 1 TABLET, FILM COATED ORAL at 17:18

## 2019-09-21 RX ADMIN — AMLODIPINE BESYLATE 10 MILLIGRAM(S): 2.5 TABLET ORAL at 06:15

## 2019-09-21 RX ADMIN — DIVALPROEX SODIUM 500 MILLIGRAM(S): 500 TABLET, DELAYED RELEASE ORAL at 21:59

## 2019-09-21 RX ADMIN — Medication 30 MILLILITER(S): at 06:52

## 2019-09-21 RX ADMIN — Medication 1 MILLIGRAM(S): at 06:15

## 2019-09-21 RX ADMIN — FLUPHENAZINE HYDROCHLORIDE 5 MILLIGRAM(S): 1 TABLET, FILM COATED ORAL at 06:15

## 2019-09-21 RX ADMIN — DIVALPROEX SODIUM 500 MILLIGRAM(S): 500 TABLET, DELAYED RELEASE ORAL at 17:18

## 2019-09-21 RX ADMIN — Medication 1 MILLIGRAM(S): at 17:18

## 2019-09-21 NOTE — PROGRESS NOTE ADULT - SUBJECTIVE AND OBJECTIVE BOX
HOSPITALIST PROGRESS NOTE    CANDICE LESTER  [id number]  64yMale    Patient is a 64y old  Male who presents with a chief complaint of Hyponatremia (21 Sep 2019 10:01)      SUBJECTIVE:   Chart reviewed since last visit.  Patient seen and examined at bedside for hyponatremia, schizophrenia.  Denies any dizziness, seizure, headache, nausea or vomiting      OBJECTIVE:  Vital Signs Last 24 Hrs  T(C): 37.1 (21 Sep 2019 16:10), Max: 37.1 (21 Sep 2019 16:10)  T(F): 98.7 (21 Sep 2019 16:10), Max: 98.7 (21 Sep 2019 16:10)  HR: 67 (21 Sep 2019 16:10) (51 - 67)  BP: 97/57 (21 Sep 2019 16:10) (97/57 - 108/60)   RR: 20 (21 Sep 2019 16:10) (18 - 20)  SpO2: 95% (21 Sep 2019 16:10) (95% - 98%)    PHYSICAL EXAMINATION  General: Sitting in bed, NAD  HEENT:  extraocular movements intact, poor dentition  NECK:  Supple  CVS: RRR S1S2   RESP:  CTAB  GI:  Soft nondistended nontender BS+  : No suprapubic tenderness  MSK:  FROM   CNS:  No gross focal or global deficit noted. Follows commands  INTEG:  Fair warm dry skin  PSYCH:  Fair mood. Thinks he's home, president is magalys, knows date.    MONITOR:  CAPILLARY BLOOD GLUCOSE            I&O's Summary                          14.7   5.68  )-----------( 333      ( 21 Sep 2019 10:59 )             42.2     PT/INR - ( 21 Sep 2019 10:59 )   PT: 11.5 sec;   INR: 1.00 ratio         PTT - ( 21 Sep 2019 10:59 )  PTT:32.2 sec  09-21    127<L>  |  91<L>  |  14.0  ----------------------------<  96  5.2   |  24.0  |  0.99    Ca    9.8      21 Sep 2019 10:59  Phos  3.4     09-21  Mg     2.4     09-21    TPro  x   /  Alb  4.0  /  TBili  x   /  DBili  x   /  AST  x   /  ALT  x   /  AlkPhos  x   09-21            Culture:    TTE:    RADIOLOGY        MEDICATIONS  (STANDING):  amLODIPine   Tablet 10 milliGRAM(s) Oral daily  benztropine 1 milliGRAM(s) Oral two times a day  chlorhexidine 2% Cloths 1 Application(s) Topical <User Schedule>  clonazePAM  Tablet 2 milliGRAM(s) Oral at bedtime  diVALproex  milliGRAM(s) Oral three times a day  enoxaparin Injectable 40 milliGRAM(s) SubCutaneous daily  fluPHENAZine 5 milliGRAM(s) Oral two times a day  lisinopril 5 milliGRAM(s) Oral daily  Urea 30 Gram(s) 30 Gram(s) Oral two times a day      MEDICATIONS  (PRN):  aluminum hydroxide/magnesium hydroxide/simethicone Suspension 30 milliLiter(s) Oral every 4 hours PRN Dyspepsia

## 2019-09-21 NOTE — PROGRESS NOTE ADULT - ASSESSMENT
64 year old male with PMH Schizophrenia, Bipolar disorder, GERD and HTN is admitted with hyponatremia (SNa 116) secondary to psychogenic polydipsia.    Hyponatremia, interval improvement since admission  - Continue to monitor  - Fluid restriction    Schizophrenia/Bipolar  - Continue Psychotropics     HTN  - Continue ACEI, CCB    GERD  - Continue MOM PRN    Prophylactic measure  - LMWH for VTEp      Disposition - pending SNa improvement. May possible need psych admission for stabilization of underlying psychiatric illness      Discussed with VIRIDIANA Garcia

## 2019-09-21 NOTE — PROGRESS NOTE ADULT - SUBJECTIVE AND OBJECTIVE BOX
[facility] DIVISION OF KIDNEY DISEASES AND HYPERTENSION -- FOLLOW UP NOTE  --------------------------------------------------------------------------------  Chief Complaint: Alert, In Chair, Mildly nauseous, Ni Vomiting,  Family Member  @ Side,     24 hour events/subjective: On IVF,     PAST HISTORY  --------------------------------------------------------------------------------  No significant changes to PMH, PSH, FHx, SHx, unless otherwise noted    ALLERGIES & MEDICATIONS  --------------------------------------------------------------------------------  Allergies    No Known Allergies    Standing Inpatient Medications  amLODIPine   Tablet 10 milliGRAM(s) Oral daily  benztropine 1 milliGRAM(s) Oral two times a day  chlorhexidine 2% Cloths 1 Application(s) Topical <User Schedule>  clonazePAM  Tablet 2 milliGRAM(s) Oral at bedtime  diVALproex  milliGRAM(s) Oral three times a day  enoxaparin Injectable 40 milliGRAM(s) SubCutaneous daily  fluPHENAZine 5 milliGRAM(s) Oral two times a day  lisinopril 5 milliGRAM(s) Oral daily    PRN Inpatient Medications  aluminum hydroxide/magnesium hydroxide/simethicone Suspension 30 milliLiter(s) Oral every 4 hours PRN    REVIEW OF SYSTEMS  --------------------------------------------------------------------------------  Gen: No weight changes, fatigue, fevers/chills, weakness  Skin: No rashes  Head/Eyes/Ears/Mouth: No headache; Normal hearing; Normal vision w/o blurriness; No sinus pain/discomfort, sore throat  Respiratory: No dyspnea, cough, wheezing, hemoptysis  CV: No chest pain, PND, orthopnea  GI: No abdominal pain, diarrhea, constipation, nausea, vomiting, melena, hematochezia  : No increased frequency, dysuria, hematuria, nocturia  MSK: No joint pain/swelling; no back pain; no edema  Neuro: No dizziness/lightheadedness, weakness, seizures, numbness, tingling  Heme: No easy bruising or bleeding  Endo: No heat/cold intolerance  Psych: No significant nervousness, anxiety, stress, depression    All other systems were reviewed and are negative, except as noted.    VITALS/PHYSICAL EXAM  --------------------------------------------------------------------------------  T(C): 36.4 (09-21-19 @ 04:30), Max: 36.8 (09-20-19 @ 15:51)  HR: 51 (09-21-19 @ 04:30) (51 - 70)  BP: 108/60 (09-21-19 @ 04:30) (107/57 - 108/60)  RR: 18 (09-21-19 @ 04:30) (18 - 18)  SpO2: 98% (09-21-19 @ 04:30) (98% - 98%)    Physical Exam:  	Gen: NAD, well-appearing  	HEENT: PERRL, supple neck,   	Pulm: CTA B/L  	CV: RRR, S1S2; no rub  	Back: No spinal or CVA tenderness; no sacral edema  	Abd: +BS, soft, nontender/nondistended  	: No suprapubic tenderness  	UE: Warm, FROM, no clubbing, intact strength; no edema; no asterixis  	LE: Warm, FROM, no clubbing, intact strength; no edema  	Neuro: No focal deficits,  	Psych: Normal affect and mood  	Skin: Warm, without rashes  	Vascular access: NA,     LABS/STUDIES  --------------------------------------------------------------------------------    122  |  89  |  9.0  ----------------------------<  94      [09-19-19 @ 20:19]  4.8   |  24.0  |  0.99        Ca     9.0     [09-19-19 @ 20:19]    Creatinine Trend:  SCr 0.99 [09-19 @ 20:19]  SCr 0.81 [09-19 @ 12:35]  SCr 0.71 [09-19 @ 05:18]  SCr 0.87 [09-19 @ 00:59]  SCr 0.80 [09-18 @ 19:46]    Urinalysis - [09-18-19 @ 15:37]      Color Yellow / Appearance Clear / SG 1.005 / pH 7.0      Gluc Negative / Ketone Negative  / Bili Negative / Urobili Negative       Blood Negative / Protein Negative / Leuk Est Negative / Nitrite Negative      RBC  / WBC  / Hyaline  / Gran  / Sq Epi  / Non Sq Epi  / Bacteria     Urine Creatinine 10      [09-18-19 @ 15:36]  Urine Sodium <30      [09-20-19 @ 06:26]  Urine Potassium 8      [09-20-19 @ 06:26]  Urine Chloride <27      [09-18-19 @ 15:36]  Urine Osmolality 119      [09-20-19 @ 06:26]    HbA1c 5.6      [04-06-19 @ 08:22]  TSH 1.99      [09-18-19 @ 19:47]  Lipid: chol 132, TG 83, HDL 51, LDL 76      [04-06-19 @ 08:22]

## 2019-09-21 NOTE — PROGRESS NOTE ADULT - ASSESSMENT
64 year old male with Bipolar disorder and HTN ,  admitted with hyponatremia (SNa 116 Meq., ) secondary to psychogenic polydipsia.    - Fluid restriction    - Continue Psychotropics     HTN  - Continue ACEI, CCB    May  need psychiatric  admission for stabilization of underlying psychiatric illness,    Please call as needed, TY.

## 2019-09-22 LAB
ANION GAP SERPL CALC-SCNC: 13 MMOL/L — SIGNIFICANT CHANGE UP (ref 5–17)
BUN SERPL-MCNC: 44 MG/DL — HIGH (ref 8–20)
CALCIUM SERPL-MCNC: 10 MG/DL — SIGNIFICANT CHANGE UP (ref 8.6–10.2)
CHLORIDE SERPL-SCNC: 92 MMOL/L — LOW (ref 98–107)
CO2 SERPL-SCNC: 29 MMOL/L — SIGNIFICANT CHANGE UP (ref 22–29)
CREAT SERPL-MCNC: 1.06 MG/DL — SIGNIFICANT CHANGE UP (ref 0.5–1.3)
GLUCOSE SERPL-MCNC: 72 MG/DL — SIGNIFICANT CHANGE UP (ref 70–115)
POTASSIUM SERPL-MCNC: 5.3 MMOL/L — SIGNIFICANT CHANGE UP (ref 3.5–5.3)
POTASSIUM SERPL-SCNC: 5.3 MMOL/L — SIGNIFICANT CHANGE UP (ref 3.5–5.3)
SODIUM SERPL-SCNC: 134 MMOL/L — LOW (ref 135–145)

## 2019-09-22 PROCEDURE — 99233 SBSQ HOSP IP/OBS HIGH 50: CPT

## 2019-09-22 PROCEDURE — 99232 SBSQ HOSP IP/OBS MODERATE 35: CPT

## 2019-09-22 RX ADMIN — Medication 1 MILLIGRAM(S): at 18:09

## 2019-09-22 RX ADMIN — Medication 30 MILLILITER(S): at 07:23

## 2019-09-22 RX ADMIN — Medication 1 MILLIGRAM(S): at 05:26

## 2019-09-22 RX ADMIN — FLUPHENAZINE HYDROCHLORIDE 5 MILLIGRAM(S): 1 TABLET, FILM COATED ORAL at 05:27

## 2019-09-22 RX ADMIN — ENOXAPARIN SODIUM 40 MILLIGRAM(S): 100 INJECTION SUBCUTANEOUS at 10:54

## 2019-09-22 RX ADMIN — CHLORHEXIDINE GLUCONATE 1 APPLICATION(S): 213 SOLUTION TOPICAL at 05:27

## 2019-09-22 RX ADMIN — LISINOPRIL 5 MILLIGRAM(S): 2.5 TABLET ORAL at 05:26

## 2019-09-22 RX ADMIN — FLUPHENAZINE HYDROCHLORIDE 5 MILLIGRAM(S): 1 TABLET, FILM COATED ORAL at 18:09

## 2019-09-22 RX ADMIN — AMLODIPINE BESYLATE 10 MILLIGRAM(S): 2.5 TABLET ORAL at 05:26

## 2019-09-22 RX ADMIN — DIVALPROEX SODIUM 500 MILLIGRAM(S): 500 TABLET, DELAYED RELEASE ORAL at 10:53

## 2019-09-22 RX ADMIN — DIVALPROEX SODIUM 500 MILLIGRAM(S): 500 TABLET, DELAYED RELEASE ORAL at 05:26

## 2019-09-22 NOTE — PROGRESS NOTE ADULT - SUBJECTIVE AND OBJECTIVE BOX
Chief Complaint: Alert, In Chair, Bi Polar , Not Agitated, Pleasantly confused,     24 hour events/subjective:  No Seizures, Fall,    PAST HISTORY  --------------------------------------------------------------------------------  No significant changes to PMH, PSH, FHx, SHx, unless otherwise noted    ALLERGIES & MEDICATIONS  --------------------------------------------------------------------------------  Allergies    No Known Allergies    Standing Inpatient Medications  amLODIPine   Tablet 10 milliGRAM(s) Oral daily  benztropine 1 milliGRAM(s) Oral two times a day  chlorhexidine 2% Cloths 1 Application(s) Topical <User Schedule>  clonazePAM  Tablet 2 milliGRAM(s) Oral at bedtime  diVALproex  milliGRAM(s) Oral three times a day  enoxaparin Injectable 40 milliGRAM(s) SubCutaneous daily  fluPHENAZine 5 milliGRAM(s) Oral two times a day  lisinopril 5 milliGRAM(s) Oral daily    PRN Inpatient Medications  aluminum hydroxide/magnesium hydroxide/simethicone Suspension 30 milliLiter(s) Oral every 4 hours PRN    REVIEW OF SYSTEMS  --------------------------------------------------------------------------------  Gen: No weight changes, fatigue, fevers/chills, weakness  Skin: No rashes  Head/Eyes/Ears/Mouth: No headache; Normal hearing; Normal vision w/o blurriness; No sinus pain/discomfort, sore throat  Respiratory: No dyspnea, cough, wheezing, hemoptysis  CV: No chest pain, PND, orthopnea  GI: No abdominal pain, diarrhea, constipation, nausea, vomiting, melena, hematochezia  : No increased frequency, dysuria, hematuria, nocturia  MSK: No joint pain/swelling; no back pain; no edema  Neuro: No dizziness/lightheadedness, weakness, seizures, numbness, tingling  Heme: No easy bruising or bleeding  Endo: No heat/cold intolerance  Psych: No significant nervousness, anxiety, stress, depression    All other systems were reviewed and are negative, except as noted.    VITALS/PHYSICAL EXAM  --------------------------------------------------------------------------------  T(C): 36.4 (09-21-19 @ 04:30), Max: 36.8 (09-20-19 @ 15:51)  HR: 51 (09-21-19 @ 04:30) (51 - 70)  BP: 108/60 (09-21-19 @ 04:30) (107/57 - 108/60)  RR: 18 (09-21-19 @ 04:30) (18 - 18)  SpO2: 98% (09-21-19 @ 04:30) (98% - 98%)    Physical Exam:  	Gen: NAD, well-appearing  	HEENT: PERRL, supple neck,   	Pulm: CTA B/L  	CV: RRR, S1S2; no rub  	Back: No spinal or CVA tenderness; no sacral edema  	Abd: +BS, soft, nontender/nondistended  	: No suprapubic tenderness  	UE: Warm, FROM, no clubbing, intact strength; no edema; no asterixis  	LE: Warm, FROM, no clubbing, intact strength; no edema  	Neuro: No focal deficits,  	Psych: Normal affect and mood  	Skin: Warm, without rashes  	Vascular access: NA,     LABS/STUDIES  --------------------------------------------------------------------------------    122  |  89  |  9.0  ----------------------------<  94      [09-19-19 @ 20:19]  4.8   |  24.0  |  0.99        Ca     9.0     [09-19-19 @ 20:19]    Creatinine Trend:  SCr 0.99 [09-19 @ 20:19]  SCr 0.81 [09-19 @ 12:35]  SCr 0.71 [09-19 @ 05:18]  SCr 0.87 [09-19 @ 00:59]  SCr 0.80 [09-18 @ 19:46]    Urinalysis - [09-18-19 @ 15:37]      Color Yellow / Appearance Clear / SG 1.005 / pH 7.0      Gluc Negative / Ketone Negative  / Bili Negative / Urobili Negative       Blood Negative / Protein Negative / Leuk Est Negative / Nitrite Negative      RBC  / WBC  / Hyaline  / Gran  / Sq Epi  / Non Sq Epi  / Bacteria     Urine Creatinine 10      [09-18-19 @ 15:36]  Urine Sodium <30      [09-20-19 @ 06:26]  Urine Potassium 8      [09-20-19 @ 06:26]  Urine Chloride <27      [09-18-19 @ 15:36]  Urine Osmolality 119      [09-20-19 @ 06:26]    HbA1c 5.6      [04-06-19 @ 08:22]  TSH 1.99      [09-18-19 @ 19:47]  Lipid: chol 132, TG 83, HDL 51, LDL 76      [04-06-19 @ 08:22]    64 year old male with Bipolar disorder and HTN ,  admitted with hyponatremia (SNa 116 Meq., ) secondary to psychogenic polydipsia.    - Fluid restriction    - Continue Psychotropics     HTN  - Continue ACEI, CCB    May  need psychiatric  admission for stabilization of underlying psychiatric illness,    Please call as needed, TY.    	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

## 2019-09-22 NOTE — PROGRESS NOTE ADULT - ASSESSMENT
64 year old male with PMH Schizophrenia, Bipolar disorder, GERD and HTN is admitted with hyponatremia (SNa 116) secondary to psychogenic polydipsia.    Hyponatremia, interval improvement since admission  - Continue to monitor  - Fluid restriction    Schizophrenia/Bipolar  - Continue Psychotropics     HTN  - Continue ACEI, CCB    GERD  - Continue MOM PRN    Prophylactic measure  - LMWH for VTEp      - LMWH for VTEp      Disposition - Repeat SNa in am to confirm normalization. May possible need psych admission for stabilization of underlying psychiatric illness      Discussed with VIRIDIANA Garcia

## 2019-09-22 NOTE — PROGRESS NOTE ADULT - SUBJECTIVE AND OBJECTIVE BOX
HOSPITALIST PROGRESS NOTE    CANDICE LESTER  [id number]  64yMale    Patient is a 64y old  Male who presents with a chief complaint of Hyponatremia (22 Sep 2019 08:11)      SUBJECTIVE:   Chart reviewed since last visit.  Patient seen and examined at bedside for hyponatremia.  Denies any dizziness, but gets occasional frontal headache. Denies any paresthesia  Feels thirsty - denies drinking a lot of water.  Voiding with light to yellow urine.      OBJECTIVE:  Vital Signs Last 24 Hrs  T(C): 36.6 (22 Sep 2019 08:01), Max: 37.1 (21 Sep 2019 16:10)  T(F): 97.9 (22 Sep 2019 08:01), Max: 98.7 (21 Sep 2019 16:10)  HR: 65 (22 Sep 2019 08:01) (51 - 67)  BP: 119/75 (22 Sep 2019 08:01) (97/57 - 119/75)   RR: 19 (22 Sep 2019 08:01) (19 - 20)  SpO2: 97% (22 Sep 2019 00:41) (95% - 97%)    PHYSICAL EXAMINATION  General: Sitting in bed, NAD  HEENT:  extraocular movements intact, poor dentition  NECK:  Supple  CVS: RRR S1S2   RESP:  CTAB  GI:  Soft nondistended nontender BS+  : No suprapubic tenderness  MSK:  FROM   CNS:  No gross focal or global deficit noted. Follows commands  INTEG:  Fair warm dry skin  PSYCH:  Fair mood. Thinks he's home, president is magalys, knows date.  MONITOR:  CAPILLARY BLOOD GLUCOSE            I&O's Summary    21 Sep 2019 07:01  -  22 Sep 2019 07:00  --------------------------------------------------------  IN: 0 mL / OUT: 360 mL / NET: -360 mL                            14.7   5.68  )-----------( 333      ( 21 Sep 2019 10:59 )             42.2     PT/INR - ( 21 Sep 2019 10:59 )   PT: 11.5 sec;   INR: 1.00 ratio         PTT - ( 21 Sep 2019 10:59 )  PTT:32.2 sec  09-22    134<L>  |  92<L>  |  44.0<H>  ----------------------------<  72  5.3   |  29.0  |  1.06    Ca    10.0      22 Sep 2019 09:36  Phos  3.4     09-21  Mg     2.4     09-21    TPro  x   /  Alb  4.0  /  TBili  x   /  DBili  x   /  AST  x   /  ALT  x   /  AlkPhos  x   09-21            Culture:    TTE:    RADIOLOGY        MEDICATIONS  (STANDING):  amLODIPine   Tablet 10 milliGRAM(s) Oral daily  benztropine 1 milliGRAM(s) Oral two times a day  chlorhexidine 2% Cloths 1 Application(s) Topical <User Schedule>  clonazePAM  Tablet 2 milliGRAM(s) Oral at bedtime  diVALproex  milliGRAM(s) Oral three times a day  enoxaparin Injectable 40 milliGRAM(s) SubCutaneous daily  fluPHENAZine 5 milliGRAM(s) Oral two times a day  lisinopril 5 milliGRAM(s) Oral daily  Urea 30 Gram(s) 30 Gram(s) Oral two times a day      MEDICATIONS  (PRN):  aluminum hydroxide/magnesium hydroxide/simethicone Suspension 30 milliLiter(s) Oral every 4 hours PRN Dyspepsia

## 2019-09-23 LAB
ANION GAP SERPL CALC-SCNC: 10 MMOL/L — SIGNIFICANT CHANGE UP (ref 5–17)
BUN SERPL-MCNC: 49 MG/DL — HIGH (ref 8–20)
CALCIUM SERPL-MCNC: 10 MG/DL — SIGNIFICANT CHANGE UP (ref 8.6–10.2)
CHLORIDE SERPL-SCNC: 93 MMOL/L — LOW (ref 98–107)
CO2 SERPL-SCNC: 28 MMOL/L — SIGNIFICANT CHANGE UP (ref 22–29)
CREAT SERPL-MCNC: 0.94 MG/DL — SIGNIFICANT CHANGE UP (ref 0.5–1.3)
GLUCOSE SERPL-MCNC: 102 MG/DL — SIGNIFICANT CHANGE UP (ref 70–115)
POTASSIUM SERPL-MCNC: 5.2 MMOL/L — SIGNIFICANT CHANGE UP (ref 3.5–5.3)
POTASSIUM SERPL-SCNC: 5.2 MMOL/L — SIGNIFICANT CHANGE UP (ref 3.5–5.3)
SODIUM SERPL-SCNC: 131 MMOL/L — LOW (ref 135–145)

## 2019-09-23 PROCEDURE — 90792 PSYCH DIAG EVAL W/MED SRVCS: CPT

## 2019-09-23 PROCEDURE — 99232 SBSQ HOSP IP/OBS MODERATE 35: CPT

## 2019-09-23 RX ADMIN — Medication 2 MILLIGRAM(S): at 21:32

## 2019-09-23 RX ADMIN — FLUPHENAZINE HYDROCHLORIDE 5 MILLIGRAM(S): 1 TABLET, FILM COATED ORAL at 17:52

## 2019-09-23 RX ADMIN — Medication 1 MILLIGRAM(S): at 05:07

## 2019-09-23 RX ADMIN — Medication 30 MILLILITER(S): at 15:32

## 2019-09-23 RX ADMIN — FLUPHENAZINE HYDROCHLORIDE 5 MILLIGRAM(S): 1 TABLET, FILM COATED ORAL at 05:07

## 2019-09-23 RX ADMIN — Medication 1 MILLIGRAM(S): at 17:52

## 2019-09-23 RX ADMIN — DIVALPROEX SODIUM 500 MILLIGRAM(S): 500 TABLET, DELAYED RELEASE ORAL at 05:07

## 2019-09-23 RX ADMIN — LISINOPRIL 5 MILLIGRAM(S): 2.5 TABLET ORAL at 05:08

## 2019-09-23 RX ADMIN — DIVALPROEX SODIUM 500 MILLIGRAM(S): 500 TABLET, DELAYED RELEASE ORAL at 21:32

## 2019-09-23 RX ADMIN — Medication 30 MILLILITER(S): at 01:33

## 2019-09-23 RX ADMIN — DIVALPROEX SODIUM 500 MILLIGRAM(S): 500 TABLET, DELAYED RELEASE ORAL at 13:15

## 2019-09-23 RX ADMIN — AMLODIPINE BESYLATE 10 MILLIGRAM(S): 2.5 TABLET ORAL at 05:07

## 2019-09-23 RX ADMIN — ENOXAPARIN SODIUM 40 MILLIGRAM(S): 100 INJECTION SUBCUTANEOUS at 13:15

## 2019-09-23 NOTE — DIETITIAN INITIAL EVALUATION ADULT. - PERTINENT LABORATORY DATA
09-23 Na131 mmol/L<L> Glu 102 mg/dL K+ 5.2 mmol/L Cr  0.94 mg/dL BUN 49.0 mg/dL<H> Phos n/a   Alb n/a   PAB n/a
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
## 2019-09-23 NOTE — PROGRESS NOTE ADULT - ASSESSMENT
64 year old male with PMH Schizophrenia, Bipolar disorder, GERD and HTN is admitted with hyponatremia (SNa 116) secondary to psychogenic polydipsia.    Hyponatremia, interval drop since admission. Elevated BUN secondary to therapeutic urea  - Continue to monitor  - Fluid restriction, ensure strictly    Schizophrenia/Bipolar  - Continue Psychotropics     HTN  - Continue ACEI, CCB    GERD  - Continue MOM PRN    Prophylactic measure  - LMWH for VTEp      - LMWH for VTEp      Disposition - Repeat SNa in am to confirm normalization. Likely needs psych admission for stabilization of underlying psychiatric illness.  Patient medically stable to be transferred to psych facility      Discussed with VRIIDIANA Garcia

## 2019-09-23 NOTE — PROGRESS NOTE ADULT - SUBJECTIVE AND OBJECTIVE BOX
HOSPITALIST PROGRESS NOTE    CANDICE LESTER  [id number]  64yMale    Patient is a 64y old  Male who presents with a chief complaint of Hyponatremia (22 Sep 2019 12:26)      SUBJECTIVE:   Chart reviewed since last visit.  Patient seen and examined at bedside for hyponatremia  Denies any dizziness, nausea, vomiting      OBJECTIVE:  Vital Signs Last 24 Hrs  T(C): 36.4 (23 Sep 2019 08:43), Max: 36.6 (23 Sep 2019 00:37)  T(F): 97.5 (23 Sep 2019 08:43), Max: 97.8 (23 Sep 2019 00:37)  HR: 90 (23 Sep 2019 08:43) (56 - 90)  BP: 124/76 (23 Sep 2019 08:43) (124/76 - 130/75)   RR: 18 (23 Sep 2019 08:43) (18 - 18)  SpO2: 97% (23 Sep 2019 00:37) (97% - 100%)    PHYSICAL EXAMINATION  General: Lying in bed, NAD  HEENT:  extraocular movements intact, poor dentition  NECK:  Supple  CVS: RRR S1S2   RESP:  CTAB  GI:  Soft nondistended nontender BS+  : No suprapubic tenderness  MSK:  FROM   CNS:  No gross focal or global deficit noted. Follows commands  INTEG:  Fair warm dry skin  PSYCH:  Fair mood.      MONITOR:  CAPILLARY BLOOD GLUCOSE            I&O's Summary        09-23    131<L>  |  93<L>  |  49.0<H>  ----------------------------<  102  5.2   |  28.0  |  0.94    Ca    10.0      23 Sep 2019 08:32              Culture:    TTE:    RADIOLOGY        MEDICATIONS  (STANDING):  amLODIPine   Tablet 10 milliGRAM(s) Oral daily  benztropine 1 milliGRAM(s) Oral two times a day  chlorhexidine 2% Cloths 1 Application(s) Topical <User Schedule>  clonazePAM  Tablet 2 milliGRAM(s) Oral at bedtime  diVALproex  milliGRAM(s) Oral three times a day  enoxaparin Injectable 40 milliGRAM(s) SubCutaneous daily  fluPHENAZine 5 milliGRAM(s) Oral two times a day  lisinopril 5 milliGRAM(s) Oral daily  Urea 30 Gram(s) 30 Gram(s) Oral two times a day      MEDICATIONS  (PRN):  aluminum hydroxide/magnesium hydroxide/simethicone Suspension 30 milliLiter(s) Oral every 4 hours PRN Dyspepsia
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
## 2019-09-23 NOTE — PROGRESS NOTE BEHAVIORAL HEALTH - SUMMARY
63 y/o male, domiciled alone, non caregiver, unemployed, with PPH of Bipolar disorder with multiple prior psychiatric hospitalizations; no known suicide attempts no active substance abuse or known history of complicated withdrawal; PMHx of GERD and recurrent hyponatremia. Patient was brought to ER for agitation.  Patient has been non compliant with medications at home. He is paranoid and irritable, feeling he isnt safe. He c/o thought insertion. Denies any active S/H I/I/P. Awaiting collateral from daughter and ACT team. Unable to contact at this time.   9/23/19  Spoke with huseyin Weller who confirms Pt is chronically noncompliant with tx and paranoid and refuses to take meds.    Remains paranoid agitated with meds resumed, irritable and a potential to self or others due to  impaired judgement and paranoid thinking.  Pt requires inpt psych admissions for stabilization of mood and psychotic thinking.  Spoke with psych MD and VIRIDIANA, who will start certification for 2PC.

## 2019-09-24 LAB
ANION GAP SERPL CALC-SCNC: 12 MMOL/L — SIGNIFICANT CHANGE UP (ref 5–17)
BUN SERPL-MCNC: 60 MG/DL — HIGH (ref 8–20)
CALCIUM SERPL-MCNC: 10.6 MG/DL — HIGH (ref 8.6–10.2)
CHLORIDE SERPL-SCNC: 89 MMOL/L — LOW (ref 98–107)
CO2 SERPL-SCNC: 27 MMOL/L — SIGNIFICANT CHANGE UP (ref 22–29)
CREAT SERPL-MCNC: 1.02 MG/DL — SIGNIFICANT CHANGE UP (ref 0.5–1.3)
GLUCOSE SERPL-MCNC: 97 MG/DL — SIGNIFICANT CHANGE UP (ref 70–115)
OSMOLALITY SERPL: 303 MOSMOL/KG — HIGH (ref 280–301)
POTASSIUM SERPL-MCNC: 5 MMOL/L — SIGNIFICANT CHANGE UP (ref 3.5–5.3)
POTASSIUM SERPL-SCNC: 5 MMOL/L — SIGNIFICANT CHANGE UP (ref 3.5–5.3)
SODIUM SERPL-SCNC: 128 MMOL/L — LOW (ref 135–145)

## 2019-09-24 PROCEDURE — 99232 SBSQ HOSP IP/OBS MODERATE 35: CPT

## 2019-09-24 PROCEDURE — 93010 ELECTROCARDIOGRAM REPORT: CPT

## 2019-09-24 RX ADMIN — Medication 1 MILLIGRAM(S): at 18:58

## 2019-09-24 RX ADMIN — ENOXAPARIN SODIUM 40 MILLIGRAM(S): 100 INJECTION SUBCUTANEOUS at 13:26

## 2019-09-24 RX ADMIN — Medication 2 MILLIGRAM(S): at 22:51

## 2019-09-24 RX ADMIN — LISINOPRIL 5 MILLIGRAM(S): 2.5 TABLET ORAL at 05:34

## 2019-09-24 RX ADMIN — Medication 30 MILLILITER(S): at 18:58

## 2019-09-24 RX ADMIN — Medication 1 MILLIGRAM(S): at 05:34

## 2019-09-24 RX ADMIN — DIVALPROEX SODIUM 500 MILLIGRAM(S): 500 TABLET, DELAYED RELEASE ORAL at 22:51

## 2019-09-24 RX ADMIN — DIVALPROEX SODIUM 500 MILLIGRAM(S): 500 TABLET, DELAYED RELEASE ORAL at 13:26

## 2019-09-24 RX ADMIN — Medication 30 MILLILITER(S): at 08:47

## 2019-09-24 RX ADMIN — CHLORHEXIDINE GLUCONATE 1 APPLICATION(S): 213 SOLUTION TOPICAL at 05:29

## 2019-09-24 RX ADMIN — AMLODIPINE BESYLATE 10 MILLIGRAM(S): 2.5 TABLET ORAL at 05:34

## 2019-09-24 RX ADMIN — FLUPHENAZINE HYDROCHLORIDE 5 MILLIGRAM(S): 1 TABLET, FILM COATED ORAL at 05:34

## 2019-09-24 RX ADMIN — DIVALPROEX SODIUM 500 MILLIGRAM(S): 500 TABLET, DELAYED RELEASE ORAL at 05:34

## 2019-09-24 RX ADMIN — FLUPHENAZINE HYDROCHLORIDE 5 MILLIGRAM(S): 1 TABLET, FILM COATED ORAL at 18:58

## 2019-09-24 NOTE — PROGRESS NOTE ADULT - SUBJECTIVE AND OBJECTIVE BOX
HOSPITALIST PROGRESS NOTE    CANDICE LESTER  [id number]  64yMale    Patient is a 64y old  Male who presents with a chief complaint of Hyponatremia (23 Sep 2019 16:11)      SUBJECTIVE:   Chart reviewed since last visit.  Patient seen and examined at bedside for hyponatremia, schizophrenia  Occasional headache, none now.  Denies dizziness, nausea or vomiting.       OBJECTIVE:  Vital Signs Last 24 Hrs  T(C): 36.6 (24 Sep 2019 08:47), Max: 36.8 (23 Sep 2019 18:07)  T(F): 97.9 (24 Sep 2019 08:47), Max: 98.2 (23 Sep 2019 18:07)  HR: 64 (24 Sep 2019 08:47) (46 - 76)  BP: 109/69 (24 Sep 2019 08:47) (109/69 - 122/74)   RR: 18 (24 Sep 2019 08:47) (18 - 18)  SpO2: 99% (24 Sep 2019 08:47) (95% - 99%)    PHYSICAL EXAMINATION  General: Sitting in bed, NAD  HEENT:  extraocular movements intact, poor dentition  NECK:  Supple  CVS: RRR S1S2   RESP:  CTAB  GI:  Soft nondistended nontender BS+  : No suprapubic tenderness  MSK:  FROM   CNS:  No gross focal or global deficit noted. Follows commands  INTEG:  Fair warm dry skin  PSYCH:  Fair mood.      MONITOR:  CAPILLARY BLOOD GLUCOSE            I&O's Summary    23 Sep 2019 07:01  -  24 Sep 2019 07:00  --------------------------------------------------------  IN: 708 mL / OUT: 0 mL / NET: 708 mL    24 Sep 2019 07:01  -  24 Sep 2019 11:37  --------------------------------------------------------  IN: 354 mL / OUT: 0 mL / NET: 354 mL          09-24    128<L>  |  89<L>  |  60.0<H>  ----------------------------<  97  5.0   |  27.0  |  1.02    Ca    10.6<H>      24 Sep 2019 09:45              Culture:    TTE:    RADIOLOGY        MEDICATIONS  (STANDING):  amLODIPine   Tablet 10 milliGRAM(s) Oral daily  benztropine 1 milliGRAM(s) Oral two times a day  chlorhexidine 2% Cloths 1 Application(s) Topical <User Schedule>  clonazePAM  Tablet 2 milliGRAM(s) Oral at bedtime  diVALproex  milliGRAM(s) Oral three times a day  enoxaparin Injectable 40 milliGRAM(s) SubCutaneous daily  fluPHENAZine 5 milliGRAM(s) Oral two times a day  lisinopril 5 milliGRAM(s) Oral daily  Urea 30 Gram(s) 30 Gram(s) Oral two times a day      MEDICATIONS  (PRN):  aluminum hydroxide/magnesium hydroxide/simethicone Suspension 30 milliLiter(s) Oral every 4 hours PRN Dyspepsia

## 2019-09-24 NOTE — PROGRESS NOTE ADULT - ASSESSMENT
64 year old male with PMH Schizophrenia, Bipolar disorder, GERD and HTN is admitted with hyponatremia (SNa 116) secondary to psychogenic polydipsia.    Hyponatremia, interval drop. unsure if patient adhering to fluid restriction. Elevated BUN secondary to therapeutic urea. Patient will have fluctuating SNa levels dependent upon water intake. Asymptomatic  - Continue to monitor  - Fluid restriction, ensure strictly    Schizophrenia/Bipolar  - Continue Psychotropics     HTN  - Continue ACEI, CCB    GERD  - Continue MOM PRN    Prophylactic measure  - LMWH for VTEp      - LMWH for VTEp      Disposition - Inpatient Psych facility for stabilization of underlying paranoid schizophrenia. 2 PC papers filled out, signed and left in chart. Patient may be discharged if SNa can be monitored    Discussed with RN, CCC, SW 64 year old male with PMH Schizophrenia, Bipolar disorder, GERD and HTN is admitted with hyponatremia (SNa 116) secondary to psychogenic polydipsia.    Hyponatremia, interval drop. Unsure if patient adhering to fluid restriction. Elevated BUN secondary to therapeutic urea. Patient will have fluctuating SNa levels dependent upon water intake. Asymptomatic  - Continue to monitor  - Fluid restriction, ensure strictly  - Repeat UOsm, Camila, SOsm    Schizophrenia/Bipolar  - Continue Psychotropics     HTN  - Continue ACEI, CCB    GERD  - Continue MOM PRN    Prophylactic measure  - LMWH for VTEp      - LMWH for VTEp      Disposition - Inpatient Psych facility for stabilization of underlying paranoid schizophrenia. 2 PC papers filled out, signed and left in chart. Patient may be discharged if SNa can be monitored    Discussed with RN, CCC, SW

## 2019-09-25 LAB
ANION GAP SERPL CALC-SCNC: 10 MMOL/L — SIGNIFICANT CHANGE UP (ref 5–17)
BUN SERPL-MCNC: 72 MG/DL — HIGH (ref 8–20)
CALCIUM SERPL-MCNC: 9.9 MG/DL — SIGNIFICANT CHANGE UP (ref 8.6–10.2)
CHLORIDE SERPL-SCNC: 92 MMOL/L — LOW (ref 98–107)
CO2 SERPL-SCNC: 29 MMOL/L — SIGNIFICANT CHANGE UP (ref 22–29)
CREAT SERPL-MCNC: 1.02 MG/DL — SIGNIFICANT CHANGE UP (ref 0.5–1.3)
GLUCOSE SERPL-MCNC: 95 MG/DL — SIGNIFICANT CHANGE UP (ref 70–115)
OSMOLALITY UR: 562 MOSM/KG — SIGNIFICANT CHANGE UP (ref 300–1000)
POTASSIUM SERPL-MCNC: 5.3 MMOL/L — SIGNIFICANT CHANGE UP (ref 3.5–5.3)
POTASSIUM SERPL-SCNC: 5.3 MMOL/L — SIGNIFICANT CHANGE UP (ref 3.5–5.3)
SODIUM SERPL-SCNC: 131 MMOL/L — LOW (ref 135–145)
SODIUM UR-SCNC: <30 MMOL/L — SIGNIFICANT CHANGE UP

## 2019-09-25 PROCEDURE — 99232 SBSQ HOSP IP/OBS MODERATE 35: CPT

## 2019-09-25 RX ADMIN — DIVALPROEX SODIUM 500 MILLIGRAM(S): 500 TABLET, DELAYED RELEASE ORAL at 05:47

## 2019-09-25 RX ADMIN — Medication 1 MILLIGRAM(S): at 16:33

## 2019-09-25 RX ADMIN — LISINOPRIL 5 MILLIGRAM(S): 2.5 TABLET ORAL at 05:47

## 2019-09-25 RX ADMIN — Medication 1 MILLIGRAM(S): at 05:47

## 2019-09-25 RX ADMIN — DIVALPROEX SODIUM 500 MILLIGRAM(S): 500 TABLET, DELAYED RELEASE ORAL at 22:48

## 2019-09-25 RX ADMIN — CHLORHEXIDINE GLUCONATE 1 APPLICATION(S): 213 SOLUTION TOPICAL at 05:47

## 2019-09-25 RX ADMIN — Medication 2 MILLIGRAM(S): at 22:48

## 2019-09-25 RX ADMIN — FLUPHENAZINE HYDROCHLORIDE 5 MILLIGRAM(S): 1 TABLET, FILM COATED ORAL at 05:46

## 2019-09-25 RX ADMIN — FLUPHENAZINE HYDROCHLORIDE 5 MILLIGRAM(S): 1 TABLET, FILM COATED ORAL at 16:33

## 2019-09-25 RX ADMIN — ENOXAPARIN SODIUM 40 MILLIGRAM(S): 100 INJECTION SUBCUTANEOUS at 10:53

## 2019-09-25 RX ADMIN — AMLODIPINE BESYLATE 10 MILLIGRAM(S): 2.5 TABLET ORAL at 05:47

## 2019-09-25 RX ADMIN — DIVALPROEX SODIUM 500 MILLIGRAM(S): 500 TABLET, DELAYED RELEASE ORAL at 14:03

## 2019-09-25 NOTE — PROGRESS NOTE ADULT - SUBJECTIVE AND OBJECTIVE BOX
HOSPITALIST PROGRESS NOTE    CANDICE LESTER  [id number]  64yMale    Patient is a 64y old  Male who presents with a chief complaint of Hyponatremia (24 Sep 2019 11:37)      SUBJECTIVE:   Chart reviewed since last visit.  Patient seen and examined at bedside for hyponatremia  Denies any dizziness, headache or nausea.  States not drinking much and urine is yellow      OBJECTIVE:  Vital Signs Last 24 Hrs  T(C): 36.9 (25 Sep 2019 08:31), Max: 36.9 (24 Sep 2019 16:08)  T(F): 98.4 (25 Sep 2019 08:31), Max: 98.5 (24 Sep 2019 16:08)  HR: 55 (25 Sep 2019 08:31) (54 - 58)  BP: 100/55 (25 Sep 2019 08:31) (100/55 - 118/76)   RR: 19 (25 Sep 2019 08:31) (17 - 19)  SpO2: 97% (25 Sep 2019 05:41) (95% - 98%)    PHYSICAL EXAMINATION  General: Sitting in bed, NAD  HEENT:  extraocular movements intact, poor dentition  NECK:  Supple  CVS: RRR S1S2   RESP:  CTAB  GI:  Soft nondistended nontender BS+  : No suprapubic tenderness  MSK:  FROM   CNS:  No gross focal or global deficit noted. Follows commands  INTEG:  Fair warm dry skin  PSYCH:  Fair mood.      MONITOR:  CAPILLARY BLOOD GLUCOSE            I&O's Summary    24 Sep 2019 07:01  -  25 Sep 2019 07:00  --------------------------------------------------------  IN: 1062 mL / OUT: 500 mL / NET: 562 mL          09-25    131<L>  |  92<L>  |  72.0<H>  ----------------------------<  95  5.3   |  29.0  |  1.02    Ca    9.9      25 Sep 2019 08:07              Culture:    TTE:    RADIOLOGY        MEDICATIONS  (STANDING):  amLODIPine   Tablet 10 milliGRAM(s) Oral daily  benztropine 1 milliGRAM(s) Oral two times a day  chlorhexidine 2% Cloths 1 Application(s) Topical <User Schedule>  clonazePAM  Tablet 2 milliGRAM(s) Oral at bedtime  diVALproex  milliGRAM(s) Oral three times a day  enoxaparin Injectable 40 milliGRAM(s) SubCutaneous daily  fluPHENAZine 5 milliGRAM(s) Oral two times a day  lisinopril 5 milliGRAM(s) Oral daily  Urea 30 Gram(s) 30 Gram(s) Oral two times a day      MEDICATIONS  (PRN):  aluminum hydroxide/magnesium hydroxide/simethicone Suspension 30 milliLiter(s) Oral every 4 hours PRN Dyspepsia

## 2019-09-25 NOTE — PROGRESS NOTE ADULT - ASSESSMENT
64 year old male with PMH Schizophrenia, Bipolar disorder, GERD and HTN is admitted with hyponatremia (SNa 116) secondary to psychogenic polydipsia.    Hyponatremia, interval drop. Unsure if patient adhering to fluid restriction. Elevated BUN secondary to therapeutic urea. Patient will have fluctuating SNa levels dependent upon water intake. Asymptomatic  - Continue to monitor  - Fluid restriction, ensure strictly    Schizophrenia/Bipolar  - Continue Psychotropics     HTN  - Continue ACEI, CCB    GERD  - Continue MOM PRN    Prophylactic measure  - LMWH for VTEp      - LMWH for VTEp      Disposition - Inpatient Psych facility for stabilization of underlying paranoid schizophrenia. 2 PC papers filled out, signed and left in chart. Patient may be discharged if SNa can be monitored     Discussed with RN, CCC, SW

## 2019-09-26 ENCOUNTER — TRANSCRIPTION ENCOUNTER (OUTPATIENT)
Age: 64
End: 2019-09-26

## 2019-09-26 ENCOUNTER — INPATIENT (INPATIENT)
Facility: HOSPITAL | Age: 64
LOS: 14 days | Discharge: HOME | End: 2019-10-11
Attending: PSYCHIATRY & NEUROLOGY | Admitting: PSYCHIATRY & NEUROLOGY
Payer: MEDICARE

## 2019-09-26 VITALS — RESPIRATION RATE: 18 BRPM | WEIGHT: 182.98 LBS | HEIGHT: 64 IN

## 2019-09-26 VITALS
HEART RATE: 56 BPM | DIASTOLIC BLOOD PRESSURE: 68 MMHG | RESPIRATION RATE: 18 BRPM | TEMPERATURE: 98 F | OXYGEN SATURATION: 98 % | SYSTOLIC BLOOD PRESSURE: 122 MMHG

## 2019-09-26 DIAGNOSIS — F31.2 BIPOLAR DISORDER, CURRENT EPISODE MANIC SEVERE WITH PSYCHOTIC FEATURES: ICD-10-CM

## 2019-09-26 DIAGNOSIS — F20.9 SCHIZOPHRENIA, UNSPECIFIED: ICD-10-CM

## 2019-09-26 LAB
ANION GAP SERPL CALC-SCNC: 10 MMOL/L — SIGNIFICANT CHANGE UP (ref 5–17)
BUN SERPL-MCNC: 57 MG/DL — HIGH (ref 8–20)
CALCIUM SERPL-MCNC: 9.9 MG/DL — SIGNIFICANT CHANGE UP (ref 8.6–10.2)
CHLORIDE SERPL-SCNC: 93 MMOL/L — LOW (ref 98–107)
CO2 SERPL-SCNC: 29 MMOL/L — SIGNIFICANT CHANGE UP (ref 22–29)
CREAT SERPL-MCNC: 0.97 MG/DL — SIGNIFICANT CHANGE UP (ref 0.5–1.3)
GLUCOSE SERPL-MCNC: 94 MG/DL — SIGNIFICANT CHANGE UP (ref 70–115)
POTASSIUM SERPL-MCNC: 4.8 MMOL/L — SIGNIFICANT CHANGE UP (ref 3.5–5.3)
POTASSIUM SERPL-SCNC: 4.8 MMOL/L — SIGNIFICANT CHANGE UP (ref 3.5–5.3)
SODIUM SERPL-SCNC: 132 MMOL/L — LOW (ref 135–145)

## 2019-09-26 PROCEDURE — 99231 SBSQ HOSP IP/OBS SF/LOW 25: CPT

## 2019-09-26 PROCEDURE — 99239 HOSP IP/OBS DSCHRG MGMT >30: CPT

## 2019-09-26 PROCEDURE — 99232 SBSQ HOSP IP/OBS MODERATE 35: CPT

## 2019-09-26 RX ORDER — LISINOPRIL 2.5 MG/1
5 TABLET ORAL DAILY
Refills: 0 | Status: DISCONTINUED | OUTPATIENT
Start: 2019-09-26 | End: 2019-10-11

## 2019-09-26 RX ORDER — BENZTROPINE MESYLATE 1 MG
0.5 TABLET ORAL
Refills: 0 | Status: DISCONTINUED | OUTPATIENT
Start: 2019-09-26 | End: 2019-10-11

## 2019-09-26 RX ORDER — LISINOPRIL 2.5 MG/1
1 TABLET ORAL
Qty: 0 | Refills: 0 | DISCHARGE
Start: 2019-09-26

## 2019-09-26 RX ORDER — FLUPHENAZINE HYDROCHLORIDE 1 MG/1
5 TABLET, FILM COATED ORAL
Refills: 0 | Status: DISCONTINUED | OUTPATIENT
Start: 2019-09-26 | End: 2019-10-11

## 2019-09-26 RX ORDER — DIPHENHYDRAMINE HCL 50 MG
50 CAPSULE ORAL ONCE
Refills: 0 | Status: DISCONTINUED | OUTPATIENT
Start: 2019-09-26 | End: 2019-10-11

## 2019-09-26 RX ORDER — HALOPERIDOL DECANOATE 100 MG/ML
5 INJECTION INTRAMUSCULAR ONCE
Refills: 0 | Status: DISCONTINUED | OUTPATIENT
Start: 2019-09-26 | End: 2019-10-11

## 2019-09-26 RX ORDER — FLUPHENAZINE HYDROCHLORIDE 1 MG/1
1 TABLET, FILM COATED ORAL
Qty: 0 | Refills: 0 | DISCHARGE
Start: 2019-09-26

## 2019-09-26 RX ORDER — FLUPHENAZINE HYDROCHLORIDE 1 MG/1
1 TABLET, FILM COATED ORAL
Qty: 0 | Refills: 0 | DISCHARGE

## 2019-09-26 RX ORDER — AMLODIPINE BESYLATE 2.5 MG/1
5 TABLET ORAL DAILY
Refills: 0 | Status: DISCONTINUED | OUTPATIENT
Start: 2019-09-26 | End: 2019-10-11

## 2019-09-26 RX ORDER — HALOPERIDOL DECANOATE 100 MG/ML
5 INJECTION INTRAMUSCULAR EVERY 6 HOURS
Refills: 0 | Status: DISCONTINUED | OUTPATIENT
Start: 2019-09-26 | End: 2019-10-11

## 2019-09-26 RX ORDER — CLONAZEPAM 1 MG
2 TABLET ORAL AT BEDTIME
Refills: 0 | Status: DISCONTINUED | OUTPATIENT
Start: 2019-09-26 | End: 2019-10-03

## 2019-09-26 RX ORDER — PANTOPRAZOLE SODIUM 20 MG/1
40 TABLET, DELAYED RELEASE ORAL
Refills: 0 | Status: DISCONTINUED | OUTPATIENT
Start: 2019-09-26 | End: 2019-10-11

## 2019-09-26 RX ORDER — HALOPERIDOL DECANOATE 100 MG/ML
5 INJECTION INTRAMUSCULAR ONCE
Refills: 0 | Status: DISCONTINUED | OUTPATIENT
Start: 2019-09-26 | End: 2019-09-26

## 2019-09-26 RX ORDER — DIPHENHYDRAMINE HCL 50 MG
50 CAPSULE ORAL EVERY 6 HOURS
Refills: 0 | Status: DISCONTINUED | OUTPATIENT
Start: 2019-09-26 | End: 2019-10-11

## 2019-09-26 RX ORDER — DIVALPROEX SODIUM 500 MG/1
500 TABLET, DELAYED RELEASE ORAL
Refills: 0 | Status: DISCONTINUED | OUTPATIENT
Start: 2019-09-26 | End: 2019-10-11

## 2019-09-26 RX ADMIN — FLUPHENAZINE HYDROCHLORIDE 5 MILLIGRAM(S): 1 TABLET, FILM COATED ORAL at 05:20

## 2019-09-26 RX ADMIN — DIVALPROEX SODIUM 500 MILLIGRAM(S): 500 TABLET, DELAYED RELEASE ORAL at 12:30

## 2019-09-26 RX ADMIN — DIVALPROEX SODIUM 500 MILLIGRAM(S): 500 TABLET, DELAYED RELEASE ORAL at 05:20

## 2019-09-26 RX ADMIN — LISINOPRIL 5 MILLIGRAM(S): 2.5 TABLET ORAL at 05:20

## 2019-09-26 RX ADMIN — CHLORHEXIDINE GLUCONATE 1 APPLICATION(S): 213 SOLUTION TOPICAL at 05:21

## 2019-09-26 RX ADMIN — AMLODIPINE BESYLATE 10 MILLIGRAM(S): 2.5 TABLET ORAL at 05:21

## 2019-09-26 RX ADMIN — Medication 1 MILLIGRAM(S): at 19:50

## 2019-09-26 RX ADMIN — Medication 0.5 MILLIGRAM(S): at 22:37

## 2019-09-26 RX ADMIN — Medication 1 MILLIGRAM(S): at 05:20

## 2019-09-26 RX ADMIN — Medication 2 MILLIGRAM(S): at 22:37

## 2019-09-26 RX ADMIN — FLUPHENAZINE HYDROCHLORIDE 5 MILLIGRAM(S): 1 TABLET, FILM COATED ORAL at 22:38

## 2019-09-26 RX ADMIN — ENOXAPARIN SODIUM 40 MILLIGRAM(S): 100 INJECTION SUBCUTANEOUS at 11:16

## 2019-09-26 RX ADMIN — DIVALPROEX SODIUM 500 MILLIGRAM(S): 500 TABLET, DELAYED RELEASE ORAL at 22:37

## 2019-09-26 RX ADMIN — FLUPHENAZINE HYDROCHLORIDE 5 MILLIGRAM(S): 1 TABLET, FILM COATED ORAL at 16:33

## 2019-09-26 NOTE — PROGRESS NOTE BEHAVIORAL HEALTH - NSBHFUPINTERVALCCFT_PSY_A_CORE
" the spirits keep fooling around with my food and tell me things."
"My head hurts.  My ears want to explode. ..I am afraid of shrimp with blue eyes and eyes of dogs.."

## 2019-09-26 NOTE — PROGRESS NOTE BEHAVIORAL HEALTH - NSBHFUPINTERVALHXFT_PSY_A_CORE
Pt seen for reeval   in semiprivate room, no 1 to 1, Patient has been accepted to [facility] on an 2PC status. Patient appears irritable and paranoid stating, " I want to go home." Patient remains psychotic and requires inpatient admission. Patient is Frisian speaking. Translation provided ID #[id number] Pt seen for reeval   in semiprivate room, no 1 to 1, Patient has been accepted to [facility] on an 2PC status. Patient appears irritable and paranoid stating, " I want to go home." Patient remains psychotic and requires inpatient admission. Patient is refusing psychiatric hospitalization and became agitated stated, " I want to go home."

## 2019-09-26 NOTE — PROGRESS NOTE BEHAVIORAL HEALTH - NSBHCHARTREVIEWLAB_PSY_A_CORE FT
Basic Metabolic Panel in AM (09.23.19 @ 08:32)    Sodium, Serum: 131 mmol/L    Potassium, Serum: 5.2 mmol/L    Chloride, Serum: 93 mmol/L    Carbon Dioxide, Serum: 28.0 mmol/L    Anion Gap, Serum: 10 mmol/L    Blood Urea Nitrogen, Serum: 49.0 mg/dL    Creatinine, Serum: 0.94 mg/dL    Glucose, Serum: 102 mg/dL    Calcium, Total Serum: 10.0 mg/dL    eGFR if Non : 85: Interpretative comment  The units for eGFR are mL/min/1.73M2 (normalized body surface area). The  eGFR is calculated from a serum creatinine using the CKD-EPI equation.  Other variables required for calculation are race, age and sex. Among  patients with chronic kidney disease (CKD), the eGFR is useful in  determining the stage of disease according to KDOQI CKD classification.  All eGFR results are reported numerically with the following  interpretation.          GFR                    With                 Without     (ml/min/1.73 m2)    Kidney Damage       Kidney Damage        >= 90                    Stage 1                     Normal        60-89                    Stage 2                     Decreased GFR        30-59     Stage 3                     Stage 3        15-29                    Stage 4                     Stage 4        < 15                      Stage 5                     Stage 5  Each stage of CKD assumes that the associated GFR level has been in  effect for at least 3 months. Determination of stages one and two (with  eGFR > 59 ml/min/m2) requires estimation of kidney damage for at least 3  months as defined by structural or functional abnormalities.  Limitations: All estimates of GFR will be less accurate for patients at  extremes of muscle mass (including but not limited to frail elderly,  critically ill, or cancer patients), those with unusual diets, and those  with conditions associated with reduced secretion or extrarenal  elimination of creatinine. The eGFR equation is not recommended for use  in patients with unstable creatinine levels. mL/min/1.73M2    eGFR if African American: 99 mL/min/1.73M2
Basic Metabolic Panel in AM (09.23.19 @ 08:32)    Sodium, Serum: 131 mmol/L    Potassium, Serum: 5.2 mmol/L    Chloride, Serum: 93 mmol/L    Carbon Dioxide, Serum: 28.0 mmol/L    Anion Gap, Serum: 10 mmol/L    Blood Urea Nitrogen, Serum: 49.0 mg/dL    Creatinine, Serum: 0.94 mg/dL    Glucose, Serum: 102 mg/dL    Calcium, Total Serum: 10.0 mg/dL    eGFR if Non : 85: Interpretative comment  The units for eGFR are mL/min/1.73M2 (normalized body surface area). The  eGFR is calculated from a serum creatinine using the CKD-EPI equation.  Other variables required for calculation are race, age and sex. Among  patients with chronic kidney disease (CKD), the eGFR is useful in  determining the stage of disease according to KDOQI CKD classification.  All eGFR results are reported numerically with the following  interpretation.          GFR                    With                 Without     (ml/min/1.73 m2)    Kidney Damage       Kidney Damage        >= 90                    Stage 1                     Normal        60-89                    Stage 2                     Decreased GFR        30-59     Stage 3                     Stage 3        15-29                    Stage 4                     Stage 4        < 15                      Stage 5                     Stage 5  Each stage of CKD assumes that the associated GFR level has been in  effect for at least 3 months. Determination of stages one and two (with  eGFR > 59 ml/min/m2) requires estimation of kidney damage for at least 3  months as defined by structural or functional abnormalities.  Limitations: All estimates of GFR will be less accurate for patients at  extremes of muscle mass (including but not limited to frail elderly,  critically ill, or cancer patients), those with unusual diets, and those  with conditions associated with reduced secretion or extrarenal  elimination of creatinine. The eGFR equation is not recommended for use  in patients with unstable creatinine levels. mL/min/1.73M2    eGFR if African American: 99 mL/min/1.73M2

## 2019-09-26 NOTE — PROGRESS NOTE BEHAVIORAL HEALTH - NSBHCHARTREVIEWINVESTIGATE_PSY_A_CORE FT
Ventricular Rate 53 BPM    Atrial Rate 53 BPM    P-R Interval 182 ms    QRS Duration 88 ms    Q-T Interval 434 ms    QTC Calculation(Bezet) 407 ms    P Axis 21 degrees    R Axis 38 degrees    T Axis 100 degrees    Diagnosis Line Sinus bradycardia  Anterior ST elevation suggestive of current of injury  LVH with repolarization changes vs ischemia        Confirmed by IMELDA BEAN ([id number]) on 9/21/2019 4:37:42 PM
Ventricular Rate 53 BPM    Atrial Rate 53 BPM    P-R Interval 182 ms    QRS Duration 88 ms    Q-T Interval 434 ms    QTC Calculation(Bezet) 407 ms    P Axis 21 degrees    R Axis 38 degrees    T Axis 100 degrees    Diagnosis Line Sinus bradycardia  Anterior ST elevation suggestive of current of injury  LVH with repolarization changes vs ischemia        Confirmed by IMELDA BEAN ([id number]) on 9/21/2019 4:37:42 PM

## 2019-09-26 NOTE — PROGRESS NOTE BEHAVIORAL HEALTH - SUMMARY
65 y/o male, domiciled alone, non caregiver, unemployed, with PPH of Bipolar disorder with multiple prior psychiatric hospitalizations; no known suicide attempts no active substance abuse or known history of complicated withdrawal; PMHx of GERD and recurrent hyponatremia. Patient was brought to ER for agitation.    9/26/19  Pt remains psychotic with poor insight and judgment and requires inpt psych admissions for stabilization of mood and psychotic thinking. Accepted to [facility]  scheduled for 1830. 63 y/o male, domiciled alone, non caregiver, unemployed, with PPH of Bipolar disorder with multiple prior psychiatric hospitalizations; no known suicide attempts no active substance abuse or known history of complicated withdrawal; PMHx of GERD and recurrent hyponatremia. Patient was brought to ER for agitation.    9/26/19  Pt remains psychotic with poor insight and judgment and requires inpt psych admissions for stabilization of mood and psychotic thinking. Accepted to [facility]  scheduled for 1830. Patient will require 4 point restraints for transport.   Give Haldol 5 mg Im x1 if needed.

## 2019-09-26 NOTE — PROGRESS NOTE BEHAVIORAL HEALTH - RISK ASSESSMENT
Moderate -Given psychotic sx's, paranoia, impulsive
Moderate -Given psychotic sx's, paranoia, impulsive

## 2019-09-26 NOTE — PROGRESS NOTE BEHAVIORAL HEALTH - NSBHCONSULTMEDS_PSY_A_CORE FT
restart depakote 500mg tid, fluphenazine 10mg bid, cogentin 1mg bid  continue clonazepam 2mg qhs      valproic acid level 63.5
restart depakote 500mg tid, fluphenazine 10mg bid, cogentin 1mg bid  continue clonazepam 2mg qhs      valproic acid level 63.5

## 2019-09-26 NOTE — PROGRESS NOTE BEHAVIORAL HEALTH - NSBHCHARTREVIEWVS_PSY_A_CORE FT
ICU Vital Signs Last 24 Hrs  T(C): 36.4 (23 Sep 2019 08:43), Max: 36.6 (23 Sep 2019 00:37)  T(F): 97.5 (23 Sep 2019 08:43), Max: 97.8 (23 Sep 2019 00:37)  HR: 90 (23 Sep 2019 08:43) (56 - 90)  BP: 124/76 (23 Sep 2019 08:43) (124/76 - 130/75)  BP(mean): --  ABP: --  ABP(mean): --  RR: 18 (23 Sep 2019 08:43) (18 - 18)  SpO2: 97% (23 Sep 2019 00:37) (97% - 100%)
ICU Vital Signs Last 24 Hrs  T(C): 36.4 (23 Sep 2019 08:43), Max: 36.6 (23 Sep 2019 00:37)  T(F): 97.5 (23 Sep 2019 08:43), Max: 97.8 (23 Sep 2019 00:37)  HR: 90 (23 Sep 2019 08:43) (56 - 90)  BP: 124/76 (23 Sep 2019 08:43) (124/76 - 130/75)  BP(mean): --  ABP: --  ABP(mean): --  RR: 18 (23 Sep 2019 08:43) (18 - 18)  SpO2: 97% (23 Sep 2019 00:37) (97% - 100%)

## 2019-09-26 NOTE — DISCHARGE NOTE PROVIDER - HOSPITAL COURSE
64 year old male with PMH Schizophrenia, Bipolar disorder, GERD and HTN admitted with acute on chronic hyponatremia (Serum Na 116) secondary to SIADH and chronic psychogenic polydipsia. In the ED, pt was started on 2% NaCl infusion per nephrologist (Dr. Choudhary) recommendations. MICU consult called and evaluated patient. During his hospitalization, he was seen and followed by the nephrology team. Patient was managed with sodium  chloride tablets, therapeutic urea, and fluid restriction for his hyponatremia. His sodium was trended, with management and improved to 131 on 9/25/19. On further review of the patient's chart  it was revealed that patient has a history of chronic hyponatremia. Unsure if patient adhering to fluid restriction. Noted  elevated BUN secondary to therapeutic urea.  Patient hyponatremia has improved to his baseline according to review of his chart. He is asymptomatic, and hemodynamically stable. The patient is medically stable for discharge. Due to his psychiatric condition patient to be discharged to a psychiatric facility to with strict fluid restriction of 1.5 liter/day to be observed, 64 year old male with PMH Schizophrenia, Bipolar disorder, GERD and HTN admitted with acute on chronic hyponatremia (Serum Na 116) secondary to SIADH and chronic psychogenic polydipsia. In the ED, pt was started on 2% NaCl infusion per nephrologist (Dr. Choudhary) recommendations. MICU consult called and evaluated patient. During his hospitalization, he was seen and followed by the nephrology team. Patient was managed with sodium  chloride tablets, therapeutic urea, and fluid restriction for his hyponatremia. His sodium was trended, with management and improved to 131 on 9/25/19. On further review of the patient's chart  it was revealed that patient has a history of chronic hyponatremia. Unsure if patient adhering to fluid restriction. Noted  elevated BUN secondary to therapeutic urea.  Patient hyponatremia has improved to his baseline according to review of his chart. He is asymptomatic, and hemodynamically stable. The patient is medically stable for discharge. Due to his psychiatric condition patient to be discharged to a psychiatric facility to with strict fluid restriction of 1.5 liter/day to be observed,            Vital Signs Last 24 Hrs    T(C): 36.5 (26 Sep 2019 15:28), Max: 36.8 (26 Sep 2019 08:03)    T(F): 97.7 (26 Sep 2019 15:28), Max: 98.2 (26 Sep 2019 08:03)    HR: 56 (26 Sep 2019 15:28) (49 - 56)    BP: 122/68 (26 Sep 2019 15:28) (111/53 - 122/68)    BP(mean): --    RR: 18 (26 Sep 2019 15:28) (18 - 18)    SpO2: 98% (26 Sep 2019 15:28) (93% - 98%)        PHYSICAL EXAMINATION    General: Sitting in bed, NAD    HEENT:  extraocular movements intact, poor dentition    NECK:  Supple, no thyromegaly    CVS: RRR S1S2, No murmur, rubs or clicks    RESP:  CTA Bilateraly    GI:  Soft nondistended nontender BS+    : No suprapubic tenderness    Extremities: No pedal edema, no joint tenderness 64 year old male with PMH Schizophrenia, Bipolar disorder, GERD and HTN admitted with acute on chronic hyponatremia (Serum Na 116) secondary to SIADH and chronic psychogenic polydipsia. In the ED, pt was started on 2% NaCl infusion per nephrologist (Dr. Choudhary) recommendations. MICU consult called and evaluated patient. During his hospitalization, he was seen and followed by the nephrology team. Patient was managed with sodium  chloride tablets, therapeutic urea, and fluid restriction for his hyponatremia. His sodium was trended, with management and improved to 131 on 9/25/19. On further review of the patient's chart  it was revealed that patient has a history of chronic hyponatremia. Unsure if patient adhering to fluid restriction. Noted  elevated BUN secondary to therapeutic urea.  Patient hyponatremia has improved to his baseline according to review of his chart. He is asymptomatic, and hemodynamically stable. The patient is medically stable for discharge, however due to his psychiatric condition patient to be discharged to a psychiatric facility for stabilization with strict fluid restriction of 1.5 liter/day to be observed.        Discussed with Dr Allred at [facility], who accepts patient for transfer.            Vital Signs Last 24 Hrs    T(C): 36.5 (26 Sep 2019 15:28), Max: 36.8 (26 Sep 2019 08:03)    T(F): 97.7 (26 Sep 2019 15:28), Max: 98.2 (26 Sep 2019 08:03)    HR: 56 (26 Sep 2019 15:28) (49 - 56)    BP: 122/68 (26 Sep 2019 15:28) (111/53 - 122/68)    BP(mean): --    RR: 18 (26 Sep 2019 15:28) (18 - 18)    SpO2: 98% (26 Sep 2019 15:28) (93% - 98%)        PHYSICAL EXAMINATION    General: Sitting in bed, NAD    HEENT:  extraocular movements intact, poor dentition    NECK:  Supple, no thyromegaly    CVS: RRR S1S2, No murmur, rubs or clicks    RESP:  CTA Bilateraly    GI:  Soft nondistended nontender BS+    : No suprapubic tenderness    Extremities: No pedal edema, no joint tenderness            Discharge time - 40 minutes

## 2019-09-26 NOTE — DISCHARGE NOTE PROVIDER - NSDCCPCAREPLAN_GEN_ALL_CORE_FT
PRINCIPAL DISCHARGE DIAGNOSIS  Diagnosis: Hyponatremia  Assessment and Plan of Treatment: You were found to have acute on chronic hyponatremia. You are on fluid restriction of 1.5 liters/day. You should follow up with Dr Brigida tadeo      SECONDARY DISCHARGE DIAGNOSES  Diagnosis: Other schizophrenia  Assessment and Plan of Treatment: Continue your home medications, and follow up with your psychiatrist outpatient    Diagnosis: Bipolar 1 disorder  Assessment and Plan of Treatment: Continue your home mdications, and follow up with your psychiatrist outpatient

## 2019-09-26 NOTE — DISCHARGE NOTE PROVIDER - NSDCACTIVITY_GEN_ALL_CORE
Do not drive or operate machinery/Bathing allowed Bathing allowed/Do not drive or operate machinery/Walking - Indoors allowed

## 2019-09-26 NOTE — PROGRESS NOTE ADULT - PROVIDER SPECIALTY LIST ADULT
Critical Care
Hospitalist
Nephrology
Critical Care

## 2019-09-26 NOTE — PROGRESS NOTE BEHAVIORAL HEALTH - THOUGHT PROCESS
Circumstantial/Illogical/Impaired reasoning/Tangential/Flight of ideas/Disorganized
Tangential/Flight of ideas/Circumstantial/Illogical/Impaired reasoning/Disorganized

## 2019-09-26 NOTE — PROGRESS NOTE ADULT - SUBJECTIVE AND OBJECTIVE BOX
Pt with SIADH, psychogenic polydipsia. As per Dr. Rose - pt is stable for D/C and does not require hospitalization if serum Na+ is greater than 130.

## 2019-09-26 NOTE — DISCHARGE NOTE NURSING/CASE MANAGEMENT/SOCIAL WORK - PATIENT PORTAL LINK FT
You can access the FollowMyHealth Patient Portal offered by [facility] by registering at the following website: [email or web address]. By joining [facility]’s FollowMyHealth portal, you will also be able to view your health information using other applications (apps) compatible with our system.

## 2019-09-26 NOTE — DISCHARGE NOTE PROVIDER - CARE PROVIDER_API CALL
Dr Rashid  [facility]   [address]  Phone: [phone]  Fax: (   )    -  Follow Up Time:

## 2019-09-27 PROCEDURE — 99223 1ST HOSP IP/OBS HIGH 75: CPT

## 2019-09-27 PROCEDURE — 99232 SBSQ HOSP IP/OBS MODERATE 35: CPT

## 2019-09-27 RX ADMIN — LISINOPRIL 5 MILLIGRAM(S): 2.5 TABLET ORAL at 09:28

## 2019-09-27 RX ADMIN — Medication 0.5 MILLIGRAM(S): at 09:28

## 2019-09-27 RX ADMIN — FLUPHENAZINE HYDROCHLORIDE 5 MILLIGRAM(S): 1 TABLET, FILM COATED ORAL at 09:28

## 2019-09-27 RX ADMIN — AMLODIPINE BESYLATE 5 MILLIGRAM(S): 2.5 TABLET ORAL at 09:28

## 2019-09-27 RX ADMIN — DIVALPROEX SODIUM 500 MILLIGRAM(S): 500 TABLET, DELAYED RELEASE ORAL at 09:28

## 2019-09-27 RX ADMIN — Medication 2 MILLIGRAM(S): at 20:24

## 2019-09-27 RX ADMIN — PANTOPRAZOLE SODIUM 40 MILLIGRAM(S): 20 TABLET, DELAYED RELEASE ORAL at 09:28

## 2019-09-27 RX ADMIN — FLUPHENAZINE HYDROCHLORIDE 5 MILLIGRAM(S): 1 TABLET, FILM COATED ORAL at 20:24

## 2019-09-27 RX ADMIN — Medication 0.5 MILLIGRAM(S): at 20:24

## 2019-09-27 RX ADMIN — DIVALPROEX SODIUM 500 MILLIGRAM(S): 500 TABLET, DELAYED RELEASE ORAL at 20:24

## 2019-09-27 NOTE — CONSULT NOTE ADULT - ASSESSMENT
64M with h/o GERD and HTN recently admitted [facility] for agitation and was found to have hyponatremia 2/2 psychogenic polydipsia.     #hyponatremia  s/p nephro and icu eval during his hospitalization. Case discussed w/primary team at [facility]. Patient workup consistent with psychogenic polydipsia.   Last sodium 132, elevated BUN is secondary to Urea meds administered by nephro.  Patient should be on fluid restriction to 1.5L daily  Obtain BMP for sodium on Monday, Oct 30th    #HTN  Continue amlodipine 5mg daily  Blood pressure will be monitored and if needed medications will be adjusted accordingly    Schizophrenia/agitation  Continue workup, management and treatment as per primary/psychiatry team 64M with h/o GERD and HTN recently admitted [facility] for agitation and was found to have hyponatremia 2/2 psychogenic polydipsia.     #hyponatremia  s/p nephro and icu eval during his hospitalization. Case discussed w/primary team at [facility]. Patient workup consistent with psychogenic polydipsia.   Last sodium 132, elevated BUN is secondary to Urea meds administered by nephro.  Patient should be on fluid restriction to 1.5L daily  Obtain BMP for sodium on Monday, Sep 30th    #HTN  Continue amlodipine 5mg daily  Blood pressure will be monitored and if needed medications will be adjusted accordingly    Schizophrenia/agitation  Continue workup, management and treatment as per primary/psychiatry team

## 2019-09-27 NOTE — CONSULT NOTE ADULT - SUBJECTIVE AND OBJECTIVE BOX
Markel Slade is a 64 year old gentlemen, mostly Finnish speaking with h/o GERD and HTN recently admitted [facility] for agitation and was found to have hyponatremia. As per workup hyponatremia likely due to psychogenic polydipsia. No reports of fevers, chills. History obtained by chart as patient is a poor historian and very delusional.    PAST MEDICAL & SURGICAL HISTORY:  Poor historian:   bipolar disorder  Schizophrenia   GERD  Chronic hyponatremia   HTN  Bipolar 1 disorder  Bipolar 1 disorder  Other schizophrenia  No significant past surgical history      Review of Systems:   Unobtainable secondary to psychiatric condition    Allergies    No Known Allergies    Intolerances        Social History:   No reports of substance abuse, lives alone    FAMILY HISTORY:  Family history of diabetes mellitus (DM)  No pertinent family history in first degree relatives      MEDICATIONS  (STANDING):  amLODIPine   Tablet 5 milliGRAM(s) Oral daily  benztropine 0.5 milliGRAM(s) Oral two times a day  clonazePAM  Tablet 2 milliGRAM(s) Oral at bedtime  diVALproex  milliGRAM(s) Oral two times a day  fluPHENAZine 5 milliGRAM(s) Oral two times a day  lisinopril 5 milliGRAM(s) Oral daily  pantoprazole    Tablet 40 milliGRAM(s) Oral before breakfast    MEDICATIONS  (PRN):  diphenhydrAMINE 50 milliGRAM(s) Oral every 6 hours PRN agitation  diphenhydrAMINE   Injectable 50 milliGRAM(s) IntraMuscular once PRN agitation  haloperidol     Tablet 5 milliGRAM(s) Oral every 6 hours PRN agitation  haloperidol    Injectable 5 milliGRAM(s) IntraMuscular once PRN agitation  LORazepam     Tablet 2 milliGRAM(s) Oral every 6 hours PRN agitation  LORazepam   Injectable 2 milliGRAM(s) IntraMuscular once PRN agitaiton      Vital Signs Last 24 Hrs  T(C): 36.4 (27 Sep 2019 08:13), Max: 36.5 (26 Sep 2019 15:28)  T(F): 97.5 (27 Sep 2019 08:13), Max: 97.7 (26 Sep 2019 15:28)  HR: 56 (26 Sep 2019 15:28) (56 - 56)  BP: 122/68 (26 Sep 2019 15:28) (122/68 - 122/68)  BP(mean): --  RR: 18 (26 Sep 2019 15:28) (18 - 18)  SpO2: 98% (26 Sep 2019 15:28) (98% - 98%)  CAPILLARY BLOOD GLUCOSE            PHYSICAL EXAM:  GENERAL: NAD, well-developed  HEAD:  Atraumatic, Normocephalic  EYES: EOMI, conjunctiva and sclera clear  NECK: Supple, No JVD  CHEST/LUNG: Clear to auscultation bilaterally; No wheeze  HEART: Regular rate and rhythm; No murmurs, rubs, or gallops  ABDOMEN: Soft, Nontender, Nondistended; Bowel sounds present  EXTREMITIES:  2+ Peripheral Pulses, No clubbing, cyanosis, or edema  NEUROLOGY: non-focal  SKIN: No rashes or lesions    LABS:    09-26    132<L>  |  93<L>  |  57.0<H>  ----------------------------<  94  4.8   |  29.0  |  0.97    Ca    9.9      26 Sep 2019 08:31                EKG(personally reviewed):    RADIOLOGY & ADDITIONAL TESTS:    Imaging Personally Reviewed:    Consultant(s) Notes Reviewed:      Care Discussed with Consultants/Other Providers: Markel Slade is a 64 year old gentlemen, mostly Brazilian speaking with h/o GERD and HTN recently admitted [facility] for agitation and was found to have hyponatremia. As per workup hyponatremia likely due to psychogenic polydipsia. No reports of fevers, chills. History obtained by chart as patient is a poor historian and very delusional.    PAST MEDICAL & SURGICAL HISTORY:  Poor historian:   bipolar disorder  Schizophrenia   GERD  Chronic hyponatremia   HTN  Bipolar 1 disorder  Bipolar 1 disorder  Other schizophrenia  No significant past surgical history      Review of Systems:   Unobtainable secondary to psychiatric condition    Allergies    No Known Allergies    Intolerances        Social History:   No reports of substance abuse, lives alone    FAMILY HISTORY:  Family history of diabetes mellitus (DM)  No pertinent family history in first degree relatives      MEDICATIONS  (STANDING):  amLODIPine   Tablet 5 milliGRAM(s) Oral daily  benztropine 0.5 milliGRAM(s) Oral two times a day  clonazePAM  Tablet 2 milliGRAM(s) Oral at bedtime  diVALproex  milliGRAM(s) Oral two times a day  fluPHENAZine 5 milliGRAM(s) Oral two times a day  lisinopril 5 milliGRAM(s) Oral daily  pantoprazole    Tablet 40 milliGRAM(s) Oral before breakfast    MEDICATIONS  (PRN):  diphenhydrAMINE 50 milliGRAM(s) Oral every 6 hours PRN agitation  diphenhydrAMINE   Injectable 50 milliGRAM(s) IntraMuscular once PRN agitation  haloperidol     Tablet 5 milliGRAM(s) Oral every 6 hours PRN agitation  haloperidol    Injectable 5 milliGRAM(s) IntraMuscular once PRN agitation  LORazepam     Tablet 2 milliGRAM(s) Oral every 6 hours PRN agitation  LORazepam   Injectable 2 milliGRAM(s) IntraMuscular once PRN agitaiton      Vital Signs Last 24 Hrs  T(C): 36.4 (27 Sep 2019 08:13), Max: 36.5 (26 Sep 2019 15:28)  T(F): 97.5 (27 Sep 2019 08:13), Max: 97.7 (26 Sep 2019 15:28)  HR: 56 (26 Sep 2019 15:28) (56 - 56)  BP: 122/68 (26 Sep 2019 15:28) (122/68 - 122/68)  BP(mean): --  RR: 18 (26 Sep 2019 15:28) (18 - 18)  SpO2: 98% (26 Sep 2019 15:28) (98% - 98%)  CAPILLARY BLOOD GLUCOSE            PHYSICAL EXAM:  GENERAL: NAD, well-developed, laying down on a mattress on the floor in the quiet room. Appears agitated and not very engaged.   HEAD:  Atraumatic, Normocephalic  EYES: EOMI, conjunctiva and sclera clear  NECK: Supple, No JVD  CHEST/LUNG: Clear to auscultation bilaterally; No wheeze  HEART: Regular rate and rhythm; No murmurs, rubs, or gallops  ABDOMEN: Soft, Nontender, Nondistended; Bowel sounds present  EXTREMITIES:  2+ Peripheral Pulses, No clubbing, cyanosis, or edema  NEUROLOGY: non-focal  SKIN: No rashes or lesions    LABS:    09-26    132<L>  |  93<L>  |  57.0<H>  ----------------------------<  94  4.8   |  29.0  |  0.97    Ca    9.9      26 Sep 2019 08:31                EKG(personally reviewed):    RADIOLOGY & ADDITIONAL TESTS:    Imaging Personally Reviewed:    Consultant(s) Notes Reviewed:      Care Discussed with Consultants/Other Providers:

## 2019-09-28 LAB
ALBUMIN SERPL ELPH-MCNC: 3.7 G/DL — SIGNIFICANT CHANGE UP (ref 3.3–5)
ALP SERPL-CCNC: 49 U/L — SIGNIFICANT CHANGE UP (ref 40–120)
ALT FLD-CCNC: 14 U/L — SIGNIFICANT CHANGE UP (ref 4–41)
ANION GAP SERPL CALC-SCNC: 8 MMO/L — SIGNIFICANT CHANGE UP (ref 7–14)
AST SERPL-CCNC: 19 U/L — SIGNIFICANT CHANGE UP (ref 4–40)
BASOPHILS # BLD AUTO: 0.06 K/UL — SIGNIFICANT CHANGE UP (ref 0–0.2)
BASOPHILS NFR BLD AUTO: 1 % — SIGNIFICANT CHANGE UP (ref 0–2)
BILIRUB SERPL-MCNC: < 0.2 MG/DL — LOW (ref 0.2–1.2)
BUN SERPL-MCNC: 17 MG/DL — SIGNIFICANT CHANGE UP (ref 7–23)
CALCIUM SERPL-MCNC: 9.2 MG/DL — SIGNIFICANT CHANGE UP (ref 8.4–10.5)
CHLORIDE SERPL-SCNC: 91 MMOL/L — LOW (ref 98–107)
CO2 SERPL-SCNC: 25 MMOL/L — SIGNIFICANT CHANGE UP (ref 22–31)
CREAT SERPL-MCNC: 0.85 MG/DL — SIGNIFICANT CHANGE UP (ref 0.5–1.3)
EOSINOPHIL # BLD AUTO: 0.2 K/UL — SIGNIFICANT CHANGE UP (ref 0–0.5)
EOSINOPHIL NFR BLD AUTO: 3.5 % — SIGNIFICANT CHANGE UP (ref 0–6)
GLUCOSE SERPL-MCNC: 88 MG/DL — SIGNIFICANT CHANGE UP (ref 70–99)
HCT VFR BLD CALC: 38.3 % — LOW (ref 39–50)
HGB BLD-MCNC: 13 G/DL — SIGNIFICANT CHANGE UP (ref 13–17)
IMM GRANULOCYTES NFR BLD AUTO: 0.3 % — SIGNIFICANT CHANGE UP (ref 0–1.5)
LYMPHOCYTES # BLD AUTO: 1.81 K/UL — SIGNIFICANT CHANGE UP (ref 1–3.3)
LYMPHOCYTES # BLD AUTO: 31.4 % — SIGNIFICANT CHANGE UP (ref 13–44)
MCHC RBC-ENTMCNC: 28.2 PG — SIGNIFICANT CHANGE UP (ref 27–34)
MCHC RBC-ENTMCNC: 33.9 % — SIGNIFICANT CHANGE UP (ref 32–36)
MCV RBC AUTO: 83.1 FL — SIGNIFICANT CHANGE UP (ref 80–100)
MONOCYTES # BLD AUTO: 0.81 K/UL — SIGNIFICANT CHANGE UP (ref 0–0.9)
MONOCYTES NFR BLD AUTO: 14 % — SIGNIFICANT CHANGE UP (ref 2–14)
NEUTROPHILS # BLD AUTO: 2.87 K/UL — SIGNIFICANT CHANGE UP (ref 1.8–7.4)
NEUTROPHILS NFR BLD AUTO: 49.8 % — SIGNIFICANT CHANGE UP (ref 43–77)
NRBC # FLD: 0 K/UL — SIGNIFICANT CHANGE UP (ref 0–0)
PLATELET # BLD AUTO: 227 K/UL — SIGNIFICANT CHANGE UP (ref 150–400)
PMV BLD: 10 FL — SIGNIFICANT CHANGE UP (ref 7–13)
POTASSIUM SERPL-MCNC: 5.2 MMOL/L — SIGNIFICANT CHANGE UP (ref 3.5–5.3)
POTASSIUM SERPL-SCNC: 5.2 MMOL/L — SIGNIFICANT CHANGE UP (ref 3.5–5.3)
PROT SERPL-MCNC: 6.6 G/DL — SIGNIFICANT CHANGE UP (ref 6–8.3)
RBC # BLD: 4.61 M/UL — SIGNIFICANT CHANGE UP (ref 4.2–5.8)
RBC # FLD: 13.5 % — SIGNIFICANT CHANGE UP (ref 10.3–14.5)
SODIUM SERPL-SCNC: 124 MMOL/L — LOW (ref 135–145)
WBC # BLD: 5.77 K/UL — SIGNIFICANT CHANGE UP (ref 3.8–10.5)
WBC # FLD AUTO: 5.77 K/UL — SIGNIFICANT CHANGE UP (ref 3.8–10.5)

## 2019-09-28 PROCEDURE — 99233 SBSQ HOSP IP/OBS HIGH 50: CPT

## 2019-09-28 RX ORDER — SODIUM CHLORIDE 9 MG/ML
1 INJECTION INTRAMUSCULAR; INTRAVENOUS; SUBCUTANEOUS DAILY
Refills: 0 | Status: DISCONTINUED | OUTPATIENT
Start: 2019-09-28 | End: 2019-09-28

## 2019-09-28 RX ORDER — SODIUM CHLORIDE 9 MG/ML
1 INJECTION INTRAMUSCULAR; INTRAVENOUS; SUBCUTANEOUS THREE TIMES A DAY
Refills: 0 | Status: DISCONTINUED | OUTPATIENT
Start: 2019-09-28 | End: 2019-09-28

## 2019-09-28 RX ADMIN — DIVALPROEX SODIUM 500 MILLIGRAM(S): 500 TABLET, DELAYED RELEASE ORAL at 09:30

## 2019-09-28 RX ADMIN — Medication 0.5 MILLIGRAM(S): at 09:30

## 2019-09-28 RX ADMIN — DIVALPROEX SODIUM 500 MILLIGRAM(S): 500 TABLET, DELAYED RELEASE ORAL at 21:45

## 2019-09-28 RX ADMIN — FLUPHENAZINE HYDROCHLORIDE 5 MILLIGRAM(S): 1 TABLET, FILM COATED ORAL at 21:45

## 2019-09-28 RX ADMIN — FLUPHENAZINE HYDROCHLORIDE 5 MILLIGRAM(S): 1 TABLET, FILM COATED ORAL at 09:30

## 2019-09-28 RX ADMIN — LISINOPRIL 5 MILLIGRAM(S): 2.5 TABLET ORAL at 09:30

## 2019-09-28 RX ADMIN — Medication 2 MILLIGRAM(S): at 21:45

## 2019-09-28 RX ADMIN — Medication 0.5 MILLIGRAM(S): at 21:45

## 2019-09-28 RX ADMIN — AMLODIPINE BESYLATE 5 MILLIGRAM(S): 2.5 TABLET ORAL at 09:31

## 2019-09-28 RX ADMIN — SODIUM CHLORIDE 1 GRAM(S): 9 INJECTION INTRAMUSCULAR; INTRAVENOUS; SUBCUTANEOUS at 13:32

## 2019-09-28 RX ADMIN — PANTOPRAZOLE SODIUM 40 MILLIGRAM(S): 20 TABLET, DELAYED RELEASE ORAL at 06:37

## 2019-09-28 NOTE — CHART NOTE - NSCHARTNOTEFT_GEN_A_CORE
Called by JOSEPHINE for concern of patient with non-adherance to fluid restriction. On repeat BMP patient found to have Na of 124 from prior value of 132 (2 days ago).     64M with h/o GERD and HTN recently admitted [facility] for agitation and was found to have hyponatremia 2/2 psychogenic polydipsia. Now with interval drop in Na i/s/o fluid restriction nonadherence.     #hyponatremia:   s/p nephro and icu eval during his hospitalization. Case discussed w/primary team at [facility]. Patient workup consistent with psychogenic polydipsia.   - repeat Na of 124 from last sodium 132 (within 48 hrs rang), elevated BUN is secondary to Urea meds administered by nephro.  - Given concern about nonadherence, Patient should be on fluid restriction to 1L daily  -  Goal of correction of sodium is 4-6 m/Eq within 24 hours, would check BMP q. 8 hrs with next repeat at 7pm   - Give Salt tabs 1gm TID Called by JOSEPHINE for concern of patient with non-adherance to fluid restriction. On repeat BMP patient found to have Na of 124 from prior value of 132 (2 days ago).     64M with h/o GERD and HTN recently admitted [facility] for agitation and was found to have hyponatremia 2/2 psychogenic polydipsia. Now with interval drop in Na i/s/o fluid restriction nonadherence.     #hyponatremia:   s/p nephro and icu eval during his hospitalization. Case discussed w/primary team at [facility]. Patient workup consistent with psychogenic polydipsia.   - repeat Na of 124 from last sodium 132 (within 48 hrs range),   - Given concern about nonadherence, Patient should be on fluid restriction to 1L daily  -  Goal of correction of sodium is 4-6 m/Eq within 24 hours, would check BMP q. 8 hrs with next repeat at 7pm   - Give Salt tabs 1gm TID

## 2019-09-28 NOTE — CONSULT NOTE ADULT - SUBJECTIVE AND OBJECTIVE BOX
CC/Reason for Consult: Hyponatremia     SUBJECTIVE / OVERNIGHT EVENTS: Mr. Slade is a 64 year old gentlemen, mostly Guatemalan speaking with h/o GERD and HTN recently admitted [facility] for agitation and was found to have hyponatremia, secondary to  psychogenic polydipsia. No reports of fevers, chills. History obtained by chart as patient is a poor historian.    MEDICATIONS  (STANDING):  amLODIPine   Tablet 5 milliGRAM(s) Oral daily  benztropine 0.5 milliGRAM(s) Oral two times a day  clonazePAM  Tablet 2 milliGRAM(s) Oral at bedtime  diVALproex  milliGRAM(s) Oral two times a day  fluPHENAZine 5 milliGRAM(s) Oral two times a day  lisinopril 5 milliGRAM(s) Oral daily  pantoprazole    Tablet 40 milliGRAM(s) Oral before breakfast  sodium chloride 1 Gram(s) Oral three times a day    MEDICATIONS  (PRN):  diphenhydrAMINE 50 milliGRAM(s) Oral every 6 hours PRN agitation  diphenhydrAMINE   Injectable 50 milliGRAM(s) IntraMuscular once PRN agitation  haloperidol     Tablet 5 milliGRAM(s) Oral every 6 hours PRN agitation  haloperidol    Injectable 5 milliGRAM(s) IntraMuscular once PRN agitation  LORazepam     Tablet 2 milliGRAM(s) Oral every 6 hours PRN agitation  LORazepam   Injectable 2 milliGRAM(s) IntraMuscular once PRN agitaiton      Vital Signs Last 24 Hrs  T(C): --  T(F): --  HR: --  BP: --  BP(mean): --  RR: --  SpO2: --  CAPILLARY BLOOD GLUCOSE            PHYSICAL EXAM:  GENERAL: NAD, well-developed  HEAD:  Atraumatic, Normocephalic  EYES: EOMI, conjunctiva and sclera clear  NECK: Supple, No JVD  CHEST/LUNG: Clear to auscultation bilaterally; No wheeze  HEART: Regular rate and rhythm; No murmurs, rubs, or gallops  ABDOMEN: Soft, Nontender, Nondistended; Bowel sounds present  EXTREMITIES:  2+ Peripheral Pulses, No clubbing, cyanosis, or edema  NEUROLOGY: non-focal  SKIN: No rashes or lesions    LABS:                        13.0   5.77  )-----------( 227      ( 28 Sep 2019 09:10 )             38.3     09-28    124<L>  |  91<L>  |  17  ----------------------------<  88  5.2   |  25  |  0.85    Ca    9.2      28 Sep 2019 09:10    TPro  6.6  /  Alb  3.7  /  TBili  < 0.2<L>  /  DBili  x   /  AST  19  /  ALT  14  /  AlkPhos  49  09-28              RADIOLOGY & ADDITIONAL TESTS:    Imaging Personally Reviewed:    Consultant(s) Notes Reviewed:      Care Discussed with Consultants/Other Providers:

## 2019-09-28 NOTE — CONSULT NOTE ADULT - ASSESSMENT
64M with h/o GERD and HTN recently admitted [facility] for agitation and was found to have hyponatremia 2/2 psychogenic polydipsia. Now with interval drop in Na i/s/o fluid restriction nonadherence.     #Hyponatremia:   s/p nephro and icu eval during his hospitalization. Case discussed w/primary team at [facility]. Patient workup consistent with psychogenic polydipsia.   - repeat Na of 124 from last sodium 132 (within 48 hrs range)  - Given concern about nonadherence, Patient should be on fluid restriction to 1L daily; pending AM sodium, will liberalize patient's fluid restriction to 1L   -  Goal of correction of sodium is 4-6 m/Eq within 24 hours, would check BMP q. 8 hrs with next repeat at 7pm   - Give Salt tabs 1gm TID.

## 2019-09-29 LAB
ANION GAP SERPL CALC-SCNC: 11 MMO/L — SIGNIFICANT CHANGE UP (ref 7–14)
BUN SERPL-MCNC: 15 MG/DL — SIGNIFICANT CHANGE UP (ref 7–23)
CALCIUM SERPL-MCNC: 9.2 MG/DL — SIGNIFICANT CHANGE UP (ref 8.4–10.5)
CHLORIDE SERPL-SCNC: 89 MMOL/L — LOW (ref 98–107)
CO2 SERPL-SCNC: 25 MMOL/L — SIGNIFICANT CHANGE UP (ref 22–31)
CREAT SERPL-MCNC: 0.83 MG/DL — SIGNIFICANT CHANGE UP (ref 0.5–1.3)
GLUCOSE SERPL-MCNC: 93 MG/DL — SIGNIFICANT CHANGE UP (ref 70–99)
POTASSIUM SERPL-MCNC: 5.1 MMOL/L — SIGNIFICANT CHANGE UP (ref 3.5–5.3)
POTASSIUM SERPL-SCNC: 5.1 MMOL/L — SIGNIFICANT CHANGE UP (ref 3.5–5.3)
SODIUM SERPL-SCNC: 125 MMOL/L — LOW (ref 135–145)

## 2019-09-29 PROCEDURE — 99233 SBSQ HOSP IP/OBS HIGH 50: CPT

## 2019-09-29 RX ORDER — SODIUM CHLORIDE 9 MG/ML
1 INJECTION INTRAMUSCULAR; INTRAVENOUS; SUBCUTANEOUS ONCE
Refills: 0 | Status: COMPLETED | OUTPATIENT
Start: 2019-09-29 | End: 2019-09-29

## 2019-09-29 RX ORDER — SODIUM CHLORIDE 9 MG/ML
1 INJECTION INTRAMUSCULAR; INTRAVENOUS; SUBCUTANEOUS THREE TIMES A DAY
Refills: 0 | Status: DISCONTINUED | OUTPATIENT
Start: 2019-09-29 | End: 2019-10-11

## 2019-09-29 RX ADMIN — PANTOPRAZOLE SODIUM 40 MILLIGRAM(S): 20 TABLET, DELAYED RELEASE ORAL at 09:17

## 2019-09-29 RX ADMIN — FLUPHENAZINE HYDROCHLORIDE 5 MILLIGRAM(S): 1 TABLET, FILM COATED ORAL at 09:17

## 2019-09-29 RX ADMIN — DIVALPROEX SODIUM 500 MILLIGRAM(S): 500 TABLET, DELAYED RELEASE ORAL at 21:48

## 2019-09-29 RX ADMIN — FLUPHENAZINE HYDROCHLORIDE 5 MILLIGRAM(S): 1 TABLET, FILM COATED ORAL at 21:48

## 2019-09-29 RX ADMIN — LISINOPRIL 5 MILLIGRAM(S): 2.5 TABLET ORAL at 09:18

## 2019-09-29 RX ADMIN — AMLODIPINE BESYLATE 5 MILLIGRAM(S): 2.5 TABLET ORAL at 09:17

## 2019-09-29 RX ADMIN — Medication 2 MILLIGRAM(S): at 21:48

## 2019-09-29 RX ADMIN — SODIUM CHLORIDE 1 GRAM(S): 9 INJECTION INTRAMUSCULAR; INTRAVENOUS; SUBCUTANEOUS at 21:48

## 2019-09-29 RX ADMIN — Medication 0.5 MILLIGRAM(S): at 21:48

## 2019-09-29 RX ADMIN — DIVALPROEX SODIUM 500 MILLIGRAM(S): 500 TABLET, DELAYED RELEASE ORAL at 09:18

## 2019-09-29 RX ADMIN — Medication 0.5 MILLIGRAM(S): at 09:17

## 2019-09-29 RX ADMIN — SODIUM CHLORIDE 1 GRAM(S): 9 INJECTION INTRAMUSCULAR; INTRAVENOUS; SUBCUTANEOUS at 11:16

## 2019-09-29 NOTE — CONSULT NOTE ADULT - ASSESSMENT
64M with h/o GERD and HTN recently admitted [facility] for agitation and was found to have hyponatremia 2/2 psychogenic polydipsia. Now with interval drop in Na i/s/o fluid restriction nonadherence, with slight improvement with salt tabs.      #Hyponatremia:   s/p nephro and icu eval during his hospitalization. Case discussed w/primary team at [facility]. Patient workup consistent with psychogenic polydipsia. Na trend 132 -> 124 -> 125 this am   - some improvement in Na to 125 from 124, s/p salt tabs x 2   - Given concern about nonadherence to fluid restriction, Patient should continue on  fluid restriction to 1L daily; however, per discussion with nursing staff on unit, difficulty to restrict patient's fluid intake on unit; pending AM sodium, will liberalize patient's fluid restriction to 1.5L   -  Goal of correction of sodium is 4-6 m/Eq within 24 hours, would check BMP q. 8 hrs with next repeat at 5pm   - Continue Salt tabs 1gm TID, duration to be determined by patient's sodium trend 64M with h/o GERD and HTN recently admitted [facility] for agitation and was found to have hyponatremia 2/2 psychogenic polydipsia. Now with interval drop in Na i/s/o fluid restriction nonadherence, with slight improvement with salt tabs.      #Hyponatremia:   s/p nephro and icu eval during his hospitalization. Case discussed w/primary team at [facility]. Patient workup consistent with psychogenic polydipsia. Na trend 132 -> 124 -> 125 this am   - some improvement in Na to 125 from 124, s/p salt tabs x 1   - Given concern about nonadherence to fluid restriction, Patient should continue on  fluid restriction to 1L daily; however, per discussion with nursing staff on unit, difficulty to restrict patient's fluid intake on unit; pending AM sodium, will liberalize patient's fluid restriction to 1.5L   -  Goal of correction of sodium is 4-6 m/Eq within 24 hours, would check BMP q. 8 hrs with next repeat at 5pm   - Continue Salt tabs 1gm TID, duration to be determined by patient's sodium trend

## 2019-09-29 NOTE — CONSULT NOTE ADULT - SUBJECTIVE AND OBJECTIVE BOX
CC/Reason for Consult:     SUBJECTIVE / OVERNIGHT EVENTS: Mr. Slade is a 64 year old gentlemen, mostly Hebrew speaking with h/o GERD and HTN recently admitted [facility] for agitation and was found to have hyponatremia, secondary to  psychogenic polydipsia. No reports of fevers, chills. History obtained by chart as patient is a poor historian. Patient placed on 1L fluid restriction yesterday and started on salt tabs.     MEDICATIONS  (STANDING):  amLODIPine   Tablet 5 milliGRAM(s) Oral daily  benztropine 0.5 milliGRAM(s) Oral two times a day  clonazePAM  Tablet 2 milliGRAM(s) Oral at bedtime  diVALproex  milliGRAM(s) Oral two times a day  fluPHENAZine 5 milliGRAM(s) Oral two times a day  lisinopril 5 milliGRAM(s) Oral daily  pantoprazole    Tablet 40 milliGRAM(s) Oral before breakfast  sodium chloride 1 Gram(s) Oral three times a day    MEDICATIONS  (PRN):  diphenhydrAMINE 50 milliGRAM(s) Oral every 6 hours PRN agitation  diphenhydrAMINE   Injectable 50 milliGRAM(s) IntraMuscular once PRN agitation  haloperidol     Tablet 5 milliGRAM(s) Oral every 6 hours PRN agitation  haloperidol    Injectable 5 milliGRAM(s) IntraMuscular once PRN agitation  LORazepam     Tablet 2 milliGRAM(s) Oral every 6 hours PRN agitation  LORazepam   Injectable 2 milliGRAM(s) IntraMuscular once PRN agitaiton      Vital Signs Last 24 Hrs  T(C): 36.6 (29 Sep 2019 09:05), Max: 36.6 (29 Sep 2019 09:05)  T(F): 97.8 (29 Sep 2019 09:05), Max: 97.8 (29 Sep 2019 09:05)  HR: --  BP: --  BP(mean): --  RR: --  SpO2: --  CAPILLARY BLOOD GLUCOSE            PHYSICAL EXAM:  GENERAL: NAD, well-developed  HEAD:  Atraumatic, Normocephalic  EYES: EOMI, conjunctiva and sclera clear  NECK: Supple, No JVD  CHEST/LUNG: Clear to auscultation bilaterally; No wheeze  HEART: Regular rate and rhythm; No murmurs, rubs, or gallops  ABDOMEN: Soft, Nontender, Nondistended; Bowel sounds present  EXTREMITIES:  2+ Peripheral Pulses, No clubbing, cyanosis, or edema  PSYCH: AAOx3  NEUROLOGY: non-focal  SKIN: No rashes or lesions    LABS:                        13.0   5.77  )-----------( 227      ( 28 Sep 2019 09:10 )             38.3     09-29    125<L>  |  89<L>  |  15  ----------------------------<  93  5.1   |  25  |  0.83    Ca    9.2      29 Sep 2019 07:54    TPro  6.6  /  Alb  3.7  /  TBili  < 0.2<L>  /  DBili  x   /  AST  19  /  ALT  14  /  AlkPhos  49  09-28              RADIOLOGY & ADDITIONAL TESTS:    Imaging Personally Reviewed:    Consultant(s) Notes Reviewed:      Care Discussed with Consultants/Other Providers: CC/Reason for Consult:     SUBJECTIVE / OVERNIGHT EVENTS: Mr. Slade is a 64 year old gentlemen, mostly Wolof speaking with h/o GERD and HTN recently admitted [facility] for agitation and was found to have hyponatremia, secondary to  psychogenic polydipsia. No reports of fevers, chills. History obtained by chart as patient is a poor historian. Patient placed on 1L fluid restriction yesterday and started on salt tabs, patient received one dose yesterday.      MEDICATIONS  (STANDING):  amLODIPine   Tablet 5 milliGRAM(s) Oral daily  benztropine 0.5 milliGRAM(s) Oral two times a day  clonazePAM  Tablet 2 milliGRAM(s) Oral at bedtime  diVALproex  milliGRAM(s) Oral two times a day  fluPHENAZine 5 milliGRAM(s) Oral two times a day  lisinopril 5 milliGRAM(s) Oral daily  pantoprazole    Tablet 40 milliGRAM(s) Oral before breakfast  sodium chloride 1 Gram(s) Oral three times a day    MEDICATIONS  (PRN):  diphenhydrAMINE 50 milliGRAM(s) Oral every 6 hours PRN agitation  diphenhydrAMINE   Injectable 50 milliGRAM(s) IntraMuscular once PRN agitation  haloperidol     Tablet 5 milliGRAM(s) Oral every 6 hours PRN agitation  haloperidol    Injectable 5 milliGRAM(s) IntraMuscular once PRN agitation  LORazepam     Tablet 2 milliGRAM(s) Oral every 6 hours PRN agitation  LORazepam   Injectable 2 milliGRAM(s) IntraMuscular once PRN agitaiton      Vital Signs Last 24 Hrs  T(C): 36.6 (29 Sep 2019 09:05), Max: 36.6 (29 Sep 2019 09:05)  T(F): 97.8 (29 Sep 2019 09:05), Max: 97.8 (29 Sep 2019 09:05)  HR: --  BP: --  BP(mean): --  RR: --  SpO2: --  CAPILLARY BLOOD GLUCOSE            PHYSICAL EXAM:  GENERAL: NAD, well-developed  HEAD:  Atraumatic, Normocephalic  EYES: EOMI, conjunctiva and sclera clear  NECK: Supple, No JVD  CHEST/LUNG: Clear to auscultation bilaterally; No wheeze  HEART: Regular rate and rhythm; No murmurs, rubs, or gallops  ABDOMEN: Soft, Nontender, Nondistended; Bowel sounds present  EXTREMITIES:  2+ Peripheral Pulses, No clubbing, cyanosis, or edema  PSYCH: AAOx3  NEUROLOGY: non-focal  SKIN: No rashes or lesions    LABS:                        13.0   5.77  )-----------( 227      ( 28 Sep 2019 09:10 )             38.3     09-29    125<L>  |  89<L>  |  15  ----------------------------<  93  5.1   |  25  |  0.83    Ca    9.2      29 Sep 2019 07:54    TPro  6.6  /  Alb  3.7  /  TBili  < 0.2<L>  /  DBili  x   /  AST  19  /  ALT  14  /  AlkPhos  49  09-28              RADIOLOGY & ADDITIONAL TESTS:    Imaging Personally Reviewed:    Consultant(s) Notes Reviewed:      Care Discussed with Consultants/Other Providers: CC/Reason for Consult:     SUBJECTIVE / OVERNIGHT EVENTS: Mr. Slade is a 64 year old gentlemen, mostly Pashto speaking with h/o GERD and HTN recently admitted [facility] for agitation and was found to have hyponatremia, secondary to  psychogenic polydipsia. No reports of fevers, chills. History obtained by chart as patient is a poor historian. Patient placed on 1L fluid restriction yesterday and started on salt tabs, patient received one dose yesterday.      MEDICATIONS  (STANDING):  amLODIPine   Tablet 5 milliGRAM(s) Oral daily  benztropine 0.5 milliGRAM(s) Oral two times a day  clonazePAM  Tablet 2 milliGRAM(s) Oral at bedtime  diVALproex  milliGRAM(s) Oral two times a day  fluPHENAZine 5 milliGRAM(s) Oral two times a day  lisinopril 5 milliGRAM(s) Oral daily  pantoprazole    Tablet 40 milliGRAM(s) Oral before breakfast  sodium chloride 1 Gram(s) Oral three times a day    MEDICATIONS  (PRN):  diphenhydrAMINE 50 milliGRAM(s) Oral every 6 hours PRN agitation  diphenhydrAMINE   Injectable 50 milliGRAM(s) IntraMuscular once PRN agitation  haloperidol     Tablet 5 milliGRAM(s) Oral every 6 hours PRN agitation  haloperidol    Injectable 5 milliGRAM(s) IntraMuscular once PRN agitation  LORazepam     Tablet 2 milliGRAM(s) Oral every 6 hours PRN agitation  LORazepam   Injectable 2 milliGRAM(s) IntraMuscular once PRN agitaiton      Vital Signs Last 24 Hrs  T(C): 36.6 (29 Sep 2019 09:05), Max: 36.6 (29 Sep 2019 09:05)  T(F): 97.8 (29 Sep 2019 09:05), Max: 97.8 (29 Sep 2019 09:05)  HR: --  BP: --  BP(mean): --  RR: --  SpO2: --  CAPILLARY BLOOD GLUCOSE            PHYSICAL EXAM:  GENERAL: NAD, well-developed  HEAD:  Atraumatic, Normocephalic  EYES: EOMI, conjunctiva and sclera clear  NECK: Supple, No JVD  CHEST/LUNG: Clear to auscultation bilaterally; No wheeze  HEART: Regular rate and rhythm; No murmurs, rubs, or gallops  ABDOMEN: Soft, Nontender, Nondistended; Bowel sounds present  EXTREMITIES:  2+ Peripheral Pulses, No clubbing, cyanosis, or edema  NEUROLOGY: non-focal  SKIN: No rashes or lesions    LABS:                        13.0   5.77  )-----------( 227      ( 28 Sep 2019 09:10 )             38.3     09-29    125<L>  |  89<L>  |  15  ----------------------------<  93  5.1   |  25  |  0.83    Ca    9.2      29 Sep 2019 07:54    TPro  6.6  /  Alb  3.7  /  TBili  < 0.2<L>  /  DBili  x   /  AST  19  /  ALT  14  /  AlkPhos  49  09-28              RADIOLOGY & ADDITIONAL TESTS:    Imaging Personally Reviewed:    Consultant(s) Notes Reviewed:      Care Discussed with Consultants/Other Providers:

## 2019-09-30 LAB
ANION GAP SERPL CALC-SCNC: 10 MMO/L — SIGNIFICANT CHANGE UP (ref 7–14)
BUN SERPL-MCNC: 14 MG/DL — SIGNIFICANT CHANGE UP (ref 7–23)
CALCIUM SERPL-MCNC: 9.3 MG/DL — SIGNIFICANT CHANGE UP (ref 8.4–10.5)
CHLORIDE SERPL-SCNC: 88 MMOL/L — LOW (ref 98–107)
CO2 SERPL-SCNC: 27 MMOL/L — SIGNIFICANT CHANGE UP (ref 22–31)
CREAT SERPL-MCNC: 0.89 MG/DL — SIGNIFICANT CHANGE UP (ref 0.5–1.3)
GLUCOSE SERPL-MCNC: 94 MG/DL — SIGNIFICANT CHANGE UP (ref 70–99)
MAGNESIUM SERPL-MCNC: 1.8 MG/DL — SIGNIFICANT CHANGE UP (ref 1.6–2.6)
PHOSPHATE SERPL-MCNC: 3.5 MG/DL — SIGNIFICANT CHANGE UP (ref 2.5–4.5)
POTASSIUM SERPL-MCNC: 5.4 MMOL/L — HIGH (ref 3.5–5.3)
POTASSIUM SERPL-SCNC: 5.4 MMOL/L — HIGH (ref 3.5–5.3)
SODIUM SERPL-SCNC: 125 MMOL/L — LOW (ref 135–145)

## 2019-09-30 PROCEDURE — 99231 SBSQ HOSP IP/OBS SF/LOW 25: CPT

## 2019-09-30 RX ADMIN — LISINOPRIL 5 MILLIGRAM(S): 2.5 TABLET ORAL at 08:47

## 2019-09-30 RX ADMIN — DIVALPROEX SODIUM 500 MILLIGRAM(S): 500 TABLET, DELAYED RELEASE ORAL at 20:52

## 2019-09-30 RX ADMIN — Medication 2 MILLIGRAM(S): at 20:52

## 2019-09-30 RX ADMIN — SODIUM CHLORIDE 1 GRAM(S): 9 INJECTION INTRAMUSCULAR; INTRAVENOUS; SUBCUTANEOUS at 20:52

## 2019-09-30 RX ADMIN — FLUPHENAZINE HYDROCHLORIDE 5 MILLIGRAM(S): 1 TABLET, FILM COATED ORAL at 08:47

## 2019-09-30 RX ADMIN — FLUPHENAZINE HYDROCHLORIDE 5 MILLIGRAM(S): 1 TABLET, FILM COATED ORAL at 20:52

## 2019-09-30 RX ADMIN — Medication 0.5 MILLIGRAM(S): at 08:47

## 2019-09-30 RX ADMIN — SODIUM CHLORIDE 1 GRAM(S): 9 INJECTION INTRAMUSCULAR; INTRAVENOUS; SUBCUTANEOUS at 13:13

## 2019-09-30 RX ADMIN — SODIUM CHLORIDE 1 GRAM(S): 9 INJECTION INTRAMUSCULAR; INTRAVENOUS; SUBCUTANEOUS at 08:47

## 2019-09-30 RX ADMIN — Medication 0.5 MILLIGRAM(S): at 20:52

## 2019-09-30 RX ADMIN — AMLODIPINE BESYLATE 5 MILLIGRAM(S): 2.5 TABLET ORAL at 08:47

## 2019-09-30 RX ADMIN — DIVALPROEX SODIUM 500 MILLIGRAM(S): 500 TABLET, DELAYED RELEASE ORAL at 08:47

## 2019-10-01 LAB
ANION GAP SERPL CALC-SCNC: 10 MMO/L — SIGNIFICANT CHANGE UP (ref 7–14)
BUN SERPL-MCNC: 13 MG/DL — SIGNIFICANT CHANGE UP (ref 7–23)
CALCIUM SERPL-MCNC: 9.1 MG/DL — SIGNIFICANT CHANGE UP (ref 8.4–10.5)
CHLORIDE SERPL-SCNC: 92 MMOL/L — LOW (ref 98–107)
CO2 SERPL-SCNC: 25 MMOL/L — SIGNIFICANT CHANGE UP (ref 22–31)
CREAT SERPL-MCNC: 0.9 MG/DL — SIGNIFICANT CHANGE UP (ref 0.5–1.3)
GLUCOSE SERPL-MCNC: 143 MG/DL — HIGH (ref 70–99)
POTASSIUM SERPL-MCNC: 5 MMOL/L — SIGNIFICANT CHANGE UP (ref 3.5–5.3)
POTASSIUM SERPL-SCNC: 5 MMOL/L — SIGNIFICANT CHANGE UP (ref 3.5–5.3)
SODIUM SERPL-SCNC: 127 MMOL/L — LOW (ref 135–145)

## 2019-10-01 PROCEDURE — 99231 SBSQ HOSP IP/OBS SF/LOW 25: CPT

## 2019-10-01 RX ADMIN — LISINOPRIL 5 MILLIGRAM(S): 2.5 TABLET ORAL at 08:55

## 2019-10-01 RX ADMIN — Medication 200 MILLIGRAM(S): at 07:04

## 2019-10-01 RX ADMIN — AMLODIPINE BESYLATE 5 MILLIGRAM(S): 2.5 TABLET ORAL at 08:55

## 2019-10-01 RX ADMIN — Medication 200 MILLIGRAM(S): at 16:11

## 2019-10-01 RX ADMIN — SODIUM CHLORIDE 1 GRAM(S): 9 INJECTION INTRAMUSCULAR; INTRAVENOUS; SUBCUTANEOUS at 13:25

## 2019-10-01 RX ADMIN — DIVALPROEX SODIUM 500 MILLIGRAM(S): 500 TABLET, DELAYED RELEASE ORAL at 20:53

## 2019-10-01 RX ADMIN — SODIUM CHLORIDE 1 GRAM(S): 9 INJECTION INTRAMUSCULAR; INTRAVENOUS; SUBCUTANEOUS at 08:55

## 2019-10-01 RX ADMIN — Medication 2 MILLIGRAM(S): at 20:53

## 2019-10-01 RX ADMIN — Medication 0.5 MILLIGRAM(S): at 08:55

## 2019-10-01 RX ADMIN — Medication 0.5 MILLIGRAM(S): at 20:53

## 2019-10-01 RX ADMIN — SODIUM CHLORIDE 1 GRAM(S): 9 INJECTION INTRAMUSCULAR; INTRAVENOUS; SUBCUTANEOUS at 20:53

## 2019-10-01 RX ADMIN — FLUPHENAZINE HYDROCHLORIDE 5 MILLIGRAM(S): 1 TABLET, FILM COATED ORAL at 08:55

## 2019-10-01 RX ADMIN — PANTOPRAZOLE SODIUM 40 MILLIGRAM(S): 20 TABLET, DELAYED RELEASE ORAL at 07:04

## 2019-10-01 RX ADMIN — FLUPHENAZINE HYDROCHLORIDE 5 MILLIGRAM(S): 1 TABLET, FILM COATED ORAL at 20:53

## 2019-10-01 RX ADMIN — DIVALPROEX SODIUM 500 MILLIGRAM(S): 500 TABLET, DELAYED RELEASE ORAL at 08:55

## 2019-10-02 LAB
ANION GAP SERPL CALC-SCNC: 9 MMO/L — SIGNIFICANT CHANGE UP (ref 7–14)
BUN SERPL-MCNC: 20 MG/DL — SIGNIFICANT CHANGE UP (ref 7–23)
CALCIUM SERPL-MCNC: 9 MG/DL — SIGNIFICANT CHANGE UP (ref 8.4–10.5)
CHLORIDE SERPL-SCNC: 95 MMOL/L — LOW (ref 98–107)
CO2 SERPL-SCNC: 23 MMOL/L — SIGNIFICANT CHANGE UP (ref 22–31)
CREAT SERPL-MCNC: 0.97 MG/DL — SIGNIFICANT CHANGE UP (ref 0.5–1.3)
GLUCOSE SERPL-MCNC: 131 MG/DL — HIGH (ref 70–99)
MAGNESIUM SERPL-MCNC: 1.8 MG/DL — SIGNIFICANT CHANGE UP (ref 1.6–2.6)
PHOSPHATE SERPL-MCNC: 3.5 MG/DL — SIGNIFICANT CHANGE UP (ref 2.5–4.5)
POTASSIUM SERPL-MCNC: 4.7 MMOL/L — SIGNIFICANT CHANGE UP (ref 3.5–5.3)
POTASSIUM SERPL-SCNC: 4.7 MMOL/L — SIGNIFICANT CHANGE UP (ref 3.5–5.3)
SODIUM SERPL-SCNC: 127 MMOL/L — LOW (ref 135–145)

## 2019-10-02 PROCEDURE — 85027 COMPLETE CBC AUTOMATED: CPT

## 2019-10-02 PROCEDURE — 99232 SBSQ HOSP IP/OBS MODERATE 35: CPT

## 2019-10-02 PROCEDURE — 36415 COLL VENOUS BLD VENIPUNCTURE: CPT

## 2019-10-02 PROCEDURE — 83735 ASSAY OF MAGNESIUM: CPT

## 2019-10-02 PROCEDURE — 80307 DRUG TEST PRSMV CHEM ANLYZR: CPT

## 2019-10-02 PROCEDURE — 85730 THROMBOPLASTIN TIME PARTIAL: CPT

## 2019-10-02 PROCEDURE — 84100 ASSAY OF PHOSPHORUS: CPT

## 2019-10-02 PROCEDURE — 84300 ASSAY OF URINE SODIUM: CPT

## 2019-10-02 PROCEDURE — 85610 PROTHROMBIN TIME: CPT

## 2019-10-02 PROCEDURE — 80069 RENAL FUNCTION PANEL: CPT

## 2019-10-02 PROCEDURE — 81003 URINALYSIS AUTO W/O SCOPE: CPT

## 2019-10-02 PROCEDURE — 99285 EMERGENCY DEPT VISIT HI MDM: CPT | Mod: 25

## 2019-10-02 PROCEDURE — T1013: CPT

## 2019-10-02 PROCEDURE — 84133 ASSAY OF URINE POTASSIUM: CPT

## 2019-10-02 PROCEDURE — 80048 BASIC METABOLIC PNL TOTAL CA: CPT

## 2019-10-02 PROCEDURE — 93005 ELECTROCARDIOGRAM TRACING: CPT

## 2019-10-02 PROCEDURE — 80164 ASSAY DIPROPYLACETIC ACD TOT: CPT

## 2019-10-02 PROCEDURE — 83935 ASSAY OF URINE OSMOLALITY: CPT

## 2019-10-02 PROCEDURE — 96374 THER/PROPH/DIAG INJ IV PUSH: CPT

## 2019-10-02 PROCEDURE — 92610 EVALUATE SWALLOWING FUNCTION: CPT

## 2019-10-02 PROCEDURE — 84550 ASSAY OF BLOOD/URIC ACID: CPT

## 2019-10-02 PROCEDURE — 82436 ASSAY OF URINE CHLORIDE: CPT

## 2019-10-02 PROCEDURE — 82570 ASSAY OF URINE CREATININE: CPT

## 2019-10-02 PROCEDURE — 83930 ASSAY OF BLOOD OSMOLALITY: CPT

## 2019-10-02 PROCEDURE — 84443 ASSAY THYROID STIM HORMONE: CPT

## 2019-10-02 PROCEDURE — 80053 COMPREHEN METABOLIC PANEL: CPT

## 2019-10-02 PROCEDURE — 96375 TX/PRO/DX INJ NEW DRUG ADDON: CPT

## 2019-10-02 RX ORDER — LANOLIN ALCOHOL/MO/W.PET/CERES
5 CREAM (GRAM) TOPICAL ONCE
Refills: 0 | Status: DISCONTINUED | OUTPATIENT
Start: 2019-10-02 | End: 2019-10-11

## 2019-10-02 RX ADMIN — Medication 200 MILLIGRAM(S): at 18:26

## 2019-10-02 RX ADMIN — SODIUM CHLORIDE 1 GRAM(S): 9 INJECTION INTRAMUSCULAR; INTRAVENOUS; SUBCUTANEOUS at 09:21

## 2019-10-02 RX ADMIN — Medication 200 MILLIGRAM(S): at 12:10

## 2019-10-02 RX ADMIN — DIVALPROEX SODIUM 500 MILLIGRAM(S): 500 TABLET, DELAYED RELEASE ORAL at 21:07

## 2019-10-02 RX ADMIN — LISINOPRIL 5 MILLIGRAM(S): 2.5 TABLET ORAL at 09:21

## 2019-10-02 RX ADMIN — Medication 0.5 MILLIGRAM(S): at 09:21

## 2019-10-02 RX ADMIN — FLUPHENAZINE HYDROCHLORIDE 5 MILLIGRAM(S): 1 TABLET, FILM COATED ORAL at 09:21

## 2019-10-02 RX ADMIN — DIVALPROEX SODIUM 500 MILLIGRAM(S): 500 TABLET, DELAYED RELEASE ORAL at 09:21

## 2019-10-02 RX ADMIN — SODIUM CHLORIDE 1 GRAM(S): 9 INJECTION INTRAMUSCULAR; INTRAVENOUS; SUBCUTANEOUS at 21:07

## 2019-10-02 RX ADMIN — PANTOPRAZOLE SODIUM 40 MILLIGRAM(S): 20 TABLET, DELAYED RELEASE ORAL at 06:30

## 2019-10-02 RX ADMIN — FLUPHENAZINE HYDROCHLORIDE 5 MILLIGRAM(S): 1 TABLET, FILM COATED ORAL at 21:07

## 2019-10-02 RX ADMIN — AMLODIPINE BESYLATE 5 MILLIGRAM(S): 2.5 TABLET ORAL at 09:21

## 2019-10-02 RX ADMIN — Medication 200 MILLIGRAM(S): at 04:20

## 2019-10-02 RX ADMIN — Medication 2 MILLIGRAM(S): at 21:07

## 2019-10-02 RX ADMIN — Medication 0.5 MILLIGRAM(S): at 21:07

## 2019-10-02 RX ADMIN — SODIUM CHLORIDE 1 GRAM(S): 9 INJECTION INTRAMUSCULAR; INTRAVENOUS; SUBCUTANEOUS at 12:33

## 2019-10-03 LAB
ANION GAP SERPL CALC-SCNC: 12 MMO/L — SIGNIFICANT CHANGE UP (ref 7–14)
BUN SERPL-MCNC: 14 MG/DL — SIGNIFICANT CHANGE UP (ref 7–23)
CALCIUM SERPL-MCNC: 9.5 MG/DL — SIGNIFICANT CHANGE UP (ref 8.4–10.5)
CHLORIDE SERPL-SCNC: 94 MMOL/L — LOW (ref 98–107)
CO2 SERPL-SCNC: 26 MMOL/L — SIGNIFICANT CHANGE UP (ref 22–31)
CREAT SERPL-MCNC: 0.95 MG/DL — SIGNIFICANT CHANGE UP (ref 0.5–1.3)
GLUCOSE SERPL-MCNC: 144 MG/DL — HIGH (ref 70–99)
POTASSIUM SERPL-MCNC: 5.1 MMOL/L — SIGNIFICANT CHANGE UP (ref 3.5–5.3)
POTASSIUM SERPL-SCNC: 5.1 MMOL/L — SIGNIFICANT CHANGE UP (ref 3.5–5.3)
SODIUM SERPL-SCNC: 132 MMOL/L — LOW (ref 135–145)

## 2019-10-03 PROCEDURE — 99231 SBSQ HOSP IP/OBS SF/LOW 25: CPT

## 2019-10-03 RX ADMIN — FLUPHENAZINE HYDROCHLORIDE 5 MILLIGRAM(S): 1 TABLET, FILM COATED ORAL at 08:42

## 2019-10-03 RX ADMIN — LISINOPRIL 5 MILLIGRAM(S): 2.5 TABLET ORAL at 08:42

## 2019-10-03 RX ADMIN — Medication 0.5 MILLIGRAM(S): at 08:42

## 2019-10-03 RX ADMIN — Medication 0.5 MILLIGRAM(S): at 20:16

## 2019-10-03 RX ADMIN — Medication 200 MILLIGRAM(S): at 16:35

## 2019-10-03 RX ADMIN — SODIUM CHLORIDE 1 GRAM(S): 9 INJECTION INTRAMUSCULAR; INTRAVENOUS; SUBCUTANEOUS at 15:43

## 2019-10-03 RX ADMIN — FLUPHENAZINE HYDROCHLORIDE 5 MILLIGRAM(S): 1 TABLET, FILM COATED ORAL at 20:16

## 2019-10-03 RX ADMIN — AMLODIPINE BESYLATE 5 MILLIGRAM(S): 2.5 TABLET ORAL at 08:42

## 2019-10-03 RX ADMIN — SODIUM CHLORIDE 1 GRAM(S): 9 INJECTION INTRAMUSCULAR; INTRAVENOUS; SUBCUTANEOUS at 08:41

## 2019-10-03 RX ADMIN — DIVALPROEX SODIUM 500 MILLIGRAM(S): 500 TABLET, DELAYED RELEASE ORAL at 08:42

## 2019-10-03 RX ADMIN — DIVALPROEX SODIUM 500 MILLIGRAM(S): 500 TABLET, DELAYED RELEASE ORAL at 20:16

## 2019-10-03 RX ADMIN — SODIUM CHLORIDE 1 GRAM(S): 9 INJECTION INTRAMUSCULAR; INTRAVENOUS; SUBCUTANEOUS at 20:16

## 2019-10-03 RX ADMIN — Medication 2 MILLIGRAM(S): at 20:16

## 2019-10-03 RX ADMIN — PANTOPRAZOLE SODIUM 40 MILLIGRAM(S): 20 TABLET, DELAYED RELEASE ORAL at 07:57

## 2019-10-03 RX ADMIN — Medication 200 MILLIGRAM(S): at 10:11

## 2019-10-03 RX ADMIN — Medication 200 MILLIGRAM(S): at 02:38

## 2019-10-04 LAB
ANION GAP SERPL CALC-SCNC: 9 MMO/L — SIGNIFICANT CHANGE UP (ref 7–14)
BUN SERPL-MCNC: 17 MG/DL — SIGNIFICANT CHANGE UP (ref 7–23)
CALCIUM SERPL-MCNC: 9.6 MG/DL — SIGNIFICANT CHANGE UP (ref 8.4–10.5)
CHLORIDE SERPL-SCNC: 95 MMOL/L — LOW (ref 98–107)
CO2 SERPL-SCNC: 28 MMOL/L — SIGNIFICANT CHANGE UP (ref 22–31)
CREAT SERPL-MCNC: 1.07 MG/DL — SIGNIFICANT CHANGE UP (ref 0.5–1.3)
GLUCOSE SERPL-MCNC: 90 MG/DL — SIGNIFICANT CHANGE UP (ref 70–99)
POTASSIUM SERPL-MCNC: 5.3 MMOL/L — SIGNIFICANT CHANGE UP (ref 3.5–5.3)
POTASSIUM SERPL-SCNC: 5.3 MMOL/L — SIGNIFICANT CHANGE UP (ref 3.5–5.3)
SODIUM SERPL-SCNC: 132 MMOL/L — LOW (ref 135–145)

## 2019-10-04 RX ORDER — CLONAZEPAM 1 MG
2 TABLET ORAL AT BEDTIME
Refills: 0 | Status: DISCONTINUED | OUTPATIENT
Start: 2019-10-04 | End: 2019-10-09

## 2019-10-04 RX ADMIN — AMLODIPINE BESYLATE 5 MILLIGRAM(S): 2.5 TABLET ORAL at 08:17

## 2019-10-04 RX ADMIN — Medication 0.5 MILLIGRAM(S): at 20:09

## 2019-10-04 RX ADMIN — SODIUM CHLORIDE 1 GRAM(S): 9 INJECTION INTRAMUSCULAR; INTRAVENOUS; SUBCUTANEOUS at 12:59

## 2019-10-04 RX ADMIN — FLUPHENAZINE HYDROCHLORIDE 5 MILLIGRAM(S): 1 TABLET, FILM COATED ORAL at 08:17

## 2019-10-04 RX ADMIN — DIVALPROEX SODIUM 500 MILLIGRAM(S): 500 TABLET, DELAYED RELEASE ORAL at 08:17

## 2019-10-04 RX ADMIN — LISINOPRIL 5 MILLIGRAM(S): 2.5 TABLET ORAL at 08:17

## 2019-10-04 RX ADMIN — SODIUM CHLORIDE 1 GRAM(S): 9 INJECTION INTRAMUSCULAR; INTRAVENOUS; SUBCUTANEOUS at 08:18

## 2019-10-04 RX ADMIN — Medication 0.5 MILLIGRAM(S): at 08:17

## 2019-10-04 RX ADMIN — DIVALPROEX SODIUM 500 MILLIGRAM(S): 500 TABLET, DELAYED RELEASE ORAL at 20:09

## 2019-10-04 RX ADMIN — Medication 2 MILLIGRAM(S): at 20:09

## 2019-10-04 RX ADMIN — SODIUM CHLORIDE 1 GRAM(S): 9 INJECTION INTRAMUSCULAR; INTRAVENOUS; SUBCUTANEOUS at 20:08

## 2019-10-04 RX ADMIN — PANTOPRAZOLE SODIUM 40 MILLIGRAM(S): 20 TABLET, DELAYED RELEASE ORAL at 06:38

## 2019-10-04 RX ADMIN — FLUPHENAZINE HYDROCHLORIDE 5 MILLIGRAM(S): 1 TABLET, FILM COATED ORAL at 20:09

## 2019-10-04 RX ADMIN — Medication 200 MILLIGRAM(S): at 01:41

## 2019-10-05 RX ADMIN — Medication 2 MILLIGRAM(S): at 20:32

## 2019-10-05 RX ADMIN — LISINOPRIL 5 MILLIGRAM(S): 2.5 TABLET ORAL at 08:45

## 2019-10-05 RX ADMIN — SODIUM CHLORIDE 1 GRAM(S): 9 INJECTION INTRAMUSCULAR; INTRAVENOUS; SUBCUTANEOUS at 20:32

## 2019-10-05 RX ADMIN — DIVALPROEX SODIUM 500 MILLIGRAM(S): 500 TABLET, DELAYED RELEASE ORAL at 08:45

## 2019-10-05 RX ADMIN — SODIUM CHLORIDE 1 GRAM(S): 9 INJECTION INTRAMUSCULAR; INTRAVENOUS; SUBCUTANEOUS at 12:47

## 2019-10-05 RX ADMIN — FLUPHENAZINE HYDROCHLORIDE 5 MILLIGRAM(S): 1 TABLET, FILM COATED ORAL at 08:45

## 2019-10-05 RX ADMIN — Medication 0.5 MILLIGRAM(S): at 08:45

## 2019-10-05 RX ADMIN — FLUPHENAZINE HYDROCHLORIDE 5 MILLIGRAM(S): 1 TABLET, FILM COATED ORAL at 20:32

## 2019-10-05 RX ADMIN — DIVALPROEX SODIUM 500 MILLIGRAM(S): 500 TABLET, DELAYED RELEASE ORAL at 20:32

## 2019-10-05 RX ADMIN — PANTOPRAZOLE SODIUM 40 MILLIGRAM(S): 20 TABLET, DELAYED RELEASE ORAL at 07:08

## 2019-10-05 RX ADMIN — AMLODIPINE BESYLATE 5 MILLIGRAM(S): 2.5 TABLET ORAL at 08:45

## 2019-10-05 RX ADMIN — SODIUM CHLORIDE 1 GRAM(S): 9 INJECTION INTRAMUSCULAR; INTRAVENOUS; SUBCUTANEOUS at 08:45

## 2019-10-05 RX ADMIN — Medication 0.5 MILLIGRAM(S): at 20:32

## 2019-10-05 RX ADMIN — Medication 200 MILLIGRAM(S): at 09:00

## 2019-10-06 RX ADMIN — DIVALPROEX SODIUM 500 MILLIGRAM(S): 500 TABLET, DELAYED RELEASE ORAL at 09:19

## 2019-10-06 RX ADMIN — Medication 0.5 MILLIGRAM(S): at 09:19

## 2019-10-06 RX ADMIN — PANTOPRAZOLE SODIUM 40 MILLIGRAM(S): 20 TABLET, DELAYED RELEASE ORAL at 06:55

## 2019-10-06 RX ADMIN — Medication 0.5 MILLIGRAM(S): at 21:01

## 2019-10-06 RX ADMIN — FLUPHENAZINE HYDROCHLORIDE 5 MILLIGRAM(S): 1 TABLET, FILM COATED ORAL at 09:19

## 2019-10-06 RX ADMIN — DIVALPROEX SODIUM 500 MILLIGRAM(S): 500 TABLET, DELAYED RELEASE ORAL at 21:01

## 2019-10-06 RX ADMIN — SODIUM CHLORIDE 1 GRAM(S): 9 INJECTION INTRAMUSCULAR; INTRAVENOUS; SUBCUTANEOUS at 21:01

## 2019-10-06 RX ADMIN — Medication 2 MILLIGRAM(S): at 21:01

## 2019-10-06 RX ADMIN — SODIUM CHLORIDE 1 GRAM(S): 9 INJECTION INTRAMUSCULAR; INTRAVENOUS; SUBCUTANEOUS at 13:05

## 2019-10-06 RX ADMIN — FLUPHENAZINE HYDROCHLORIDE 5 MILLIGRAM(S): 1 TABLET, FILM COATED ORAL at 21:01

## 2019-10-06 RX ADMIN — SODIUM CHLORIDE 1 GRAM(S): 9 INJECTION INTRAMUSCULAR; INTRAVENOUS; SUBCUTANEOUS at 09:19

## 2019-10-06 RX ADMIN — LISINOPRIL 5 MILLIGRAM(S): 2.5 TABLET ORAL at 09:19

## 2019-10-06 RX ADMIN — Medication 200 MILLIGRAM(S): at 21:01

## 2019-10-06 RX ADMIN — Medication 200 MILLIGRAM(S): at 13:05

## 2019-10-06 RX ADMIN — AMLODIPINE BESYLATE 5 MILLIGRAM(S): 2.5 TABLET ORAL at 09:20

## 2019-10-07 LAB
ANION GAP SERPL CALC-SCNC: 15 MMO/L — HIGH (ref 7–14)
BUN SERPL-MCNC: 14 MG/DL — SIGNIFICANT CHANGE UP (ref 7–23)
CALCIUM SERPL-MCNC: 9.3 MG/DL — SIGNIFICANT CHANGE UP (ref 8.4–10.5)
CHLORIDE SERPL-SCNC: 91 MMOL/L — LOW (ref 98–107)
CO2 SERPL-SCNC: 24 MMOL/L — SIGNIFICANT CHANGE UP (ref 22–31)
CREAT SERPL-MCNC: 0.9 MG/DL — SIGNIFICANT CHANGE UP (ref 0.5–1.3)
GLUCOSE SERPL-MCNC: 89 MG/DL — SIGNIFICANT CHANGE UP (ref 70–99)
POTASSIUM SERPL-MCNC: 5.4 MMOL/L — HIGH (ref 3.5–5.3)
POTASSIUM SERPL-SCNC: 5.4 MMOL/L — HIGH (ref 3.5–5.3)
SODIUM SERPL-SCNC: 130 MMOL/L — LOW (ref 135–145)

## 2019-10-07 PROCEDURE — 99231 SBSQ HOSP IP/OBS SF/LOW 25: CPT

## 2019-10-07 RX ADMIN — SODIUM CHLORIDE 1 GRAM(S): 9 INJECTION INTRAMUSCULAR; INTRAVENOUS; SUBCUTANEOUS at 20:16

## 2019-10-07 RX ADMIN — Medication 0.5 MILLIGRAM(S): at 20:15

## 2019-10-07 RX ADMIN — FLUPHENAZINE HYDROCHLORIDE 5 MILLIGRAM(S): 1 TABLET, FILM COATED ORAL at 08:38

## 2019-10-07 RX ADMIN — AMLODIPINE BESYLATE 5 MILLIGRAM(S): 2.5 TABLET ORAL at 08:38

## 2019-10-07 RX ADMIN — FLUPHENAZINE HYDROCHLORIDE 5 MILLIGRAM(S): 1 TABLET, FILM COATED ORAL at 20:15

## 2019-10-07 RX ADMIN — Medication 200 MILLIGRAM(S): at 08:41

## 2019-10-07 RX ADMIN — PANTOPRAZOLE SODIUM 40 MILLIGRAM(S): 20 TABLET, DELAYED RELEASE ORAL at 06:34

## 2019-10-07 RX ADMIN — SODIUM CHLORIDE 1 GRAM(S): 9 INJECTION INTRAMUSCULAR; INTRAVENOUS; SUBCUTANEOUS at 14:10

## 2019-10-07 RX ADMIN — DIVALPROEX SODIUM 500 MILLIGRAM(S): 500 TABLET, DELAYED RELEASE ORAL at 20:15

## 2019-10-07 RX ADMIN — SODIUM CHLORIDE 1 GRAM(S): 9 INJECTION INTRAMUSCULAR; INTRAVENOUS; SUBCUTANEOUS at 08:38

## 2019-10-07 RX ADMIN — Medication 2 MILLIGRAM(S): at 20:15

## 2019-10-07 RX ADMIN — DIVALPROEX SODIUM 500 MILLIGRAM(S): 500 TABLET, DELAYED RELEASE ORAL at 08:38

## 2019-10-07 RX ADMIN — Medication 0.5 MILLIGRAM(S): at 08:38

## 2019-10-07 RX ADMIN — LISINOPRIL 5 MILLIGRAM(S): 2.5 TABLET ORAL at 08:38

## 2019-10-08 LAB
ANION GAP SERPL CALC-SCNC: 9 MMO/L — SIGNIFICANT CHANGE UP (ref 7–14)
BUN SERPL-MCNC: 17 MG/DL — SIGNIFICANT CHANGE UP (ref 7–23)
CALCIUM SERPL-MCNC: 9.1 MG/DL — SIGNIFICANT CHANGE UP (ref 8.4–10.5)
CHLORIDE SERPL-SCNC: 94 MMOL/L — LOW (ref 98–107)
CO2 SERPL-SCNC: 24 MMOL/L — SIGNIFICANT CHANGE UP (ref 22–31)
CREAT SERPL-MCNC: 1 MG/DL — SIGNIFICANT CHANGE UP (ref 0.5–1.3)
GLUCOSE SERPL-MCNC: 101 MG/DL — HIGH (ref 70–99)
POTASSIUM SERPL-MCNC: 5.1 MMOL/L — SIGNIFICANT CHANGE UP (ref 3.5–5.3)
POTASSIUM SERPL-SCNC: 5.1 MMOL/L — SIGNIFICANT CHANGE UP (ref 3.5–5.3)
SODIUM SERPL-SCNC: 127 MMOL/L — LOW (ref 135–145)

## 2019-10-08 PROCEDURE — 99231 SBSQ HOSP IP/OBS SF/LOW 25: CPT

## 2019-10-08 RX ADMIN — Medication 0.5 MILLIGRAM(S): at 21:16

## 2019-10-08 RX ADMIN — DIVALPROEX SODIUM 500 MILLIGRAM(S): 500 TABLET, DELAYED RELEASE ORAL at 21:16

## 2019-10-08 RX ADMIN — FLUPHENAZINE HYDROCHLORIDE 5 MILLIGRAM(S): 1 TABLET, FILM COATED ORAL at 08:31

## 2019-10-08 RX ADMIN — DIVALPROEX SODIUM 500 MILLIGRAM(S): 500 TABLET, DELAYED RELEASE ORAL at 08:31

## 2019-10-08 RX ADMIN — SODIUM CHLORIDE 1 GRAM(S): 9 INJECTION INTRAMUSCULAR; INTRAVENOUS; SUBCUTANEOUS at 21:16

## 2019-10-08 RX ADMIN — PANTOPRAZOLE SODIUM 40 MILLIGRAM(S): 20 TABLET, DELAYED RELEASE ORAL at 06:43

## 2019-10-08 RX ADMIN — Medication 200 MILLIGRAM(S): at 21:16

## 2019-10-08 RX ADMIN — LISINOPRIL 5 MILLIGRAM(S): 2.5 TABLET ORAL at 08:31

## 2019-10-08 RX ADMIN — Medication 0.5 MILLIGRAM(S): at 08:31

## 2019-10-08 RX ADMIN — AMLODIPINE BESYLATE 5 MILLIGRAM(S): 2.5 TABLET ORAL at 08:31

## 2019-10-08 RX ADMIN — Medication 2 MILLIGRAM(S): at 21:16

## 2019-10-08 RX ADMIN — SODIUM CHLORIDE 1 GRAM(S): 9 INJECTION INTRAMUSCULAR; INTRAVENOUS; SUBCUTANEOUS at 13:55

## 2019-10-08 RX ADMIN — FLUPHENAZINE HYDROCHLORIDE 5 MILLIGRAM(S): 1 TABLET, FILM COATED ORAL at 21:16

## 2019-10-08 RX ADMIN — SODIUM CHLORIDE 1 GRAM(S): 9 INJECTION INTRAMUSCULAR; INTRAVENOUS; SUBCUTANEOUS at 08:31

## 2019-10-09 LAB
ANION GAP SERPL CALC-SCNC: 12 MMO/L — SIGNIFICANT CHANGE UP (ref 7–14)
BUN SERPL-MCNC: 20 MG/DL — SIGNIFICANT CHANGE UP (ref 7–23)
CALCIUM SERPL-MCNC: 9 MG/DL — SIGNIFICANT CHANGE UP (ref 8.4–10.5)
CHLORIDE SERPL-SCNC: 94 MMOL/L — LOW (ref 98–107)
CO2 SERPL-SCNC: 24 MMOL/L — SIGNIFICANT CHANGE UP (ref 22–31)
CREAT SERPL-MCNC: 0.93 MG/DL — SIGNIFICANT CHANGE UP (ref 0.5–1.3)
GLUCOSE SERPL-MCNC: 91 MG/DL — SIGNIFICANT CHANGE UP (ref 70–99)
POTASSIUM SERPL-MCNC: 5 MMOL/L — SIGNIFICANT CHANGE UP (ref 3.5–5.3)
POTASSIUM SERPL-SCNC: 5 MMOL/L — SIGNIFICANT CHANGE UP (ref 3.5–5.3)
SODIUM SERPL-SCNC: 130 MMOL/L — LOW (ref 135–145)

## 2019-10-09 PROCEDURE — 99231 SBSQ HOSP IP/OBS SF/LOW 25: CPT

## 2019-10-09 RX ORDER — CLONAZEPAM 1 MG
2 TABLET ORAL AT BEDTIME
Refills: 0 | Status: DISCONTINUED | OUTPATIENT
Start: 2019-10-09 | End: 2019-10-11

## 2019-10-09 RX ADMIN — Medication 0.5 MILLIGRAM(S): at 08:21

## 2019-10-09 RX ADMIN — DIVALPROEX SODIUM 500 MILLIGRAM(S): 500 TABLET, DELAYED RELEASE ORAL at 08:21

## 2019-10-09 RX ADMIN — PANTOPRAZOLE SODIUM 40 MILLIGRAM(S): 20 TABLET, DELAYED RELEASE ORAL at 06:33

## 2019-10-09 RX ADMIN — DIVALPROEX SODIUM 500 MILLIGRAM(S): 500 TABLET, DELAYED RELEASE ORAL at 20:44

## 2019-10-09 RX ADMIN — SODIUM CHLORIDE 1 GRAM(S): 9 INJECTION INTRAMUSCULAR; INTRAVENOUS; SUBCUTANEOUS at 08:20

## 2019-10-09 RX ADMIN — Medication 200 MILLIGRAM(S): at 21:06

## 2019-10-09 RX ADMIN — AMLODIPINE BESYLATE 5 MILLIGRAM(S): 2.5 TABLET ORAL at 08:21

## 2019-10-09 RX ADMIN — SODIUM CHLORIDE 1 GRAM(S): 9 INJECTION INTRAMUSCULAR; INTRAVENOUS; SUBCUTANEOUS at 12:51

## 2019-10-09 RX ADMIN — Medication 0.5 MILLIGRAM(S): at 20:44

## 2019-10-09 RX ADMIN — FLUPHENAZINE HYDROCHLORIDE 5 MILLIGRAM(S): 1 TABLET, FILM COATED ORAL at 20:44

## 2019-10-09 RX ADMIN — LISINOPRIL 5 MILLIGRAM(S): 2.5 TABLET ORAL at 08:20

## 2019-10-09 RX ADMIN — SODIUM CHLORIDE 1 GRAM(S): 9 INJECTION INTRAMUSCULAR; INTRAVENOUS; SUBCUTANEOUS at 20:44

## 2019-10-09 RX ADMIN — Medication 2 MILLIGRAM(S): at 20:44

## 2019-10-09 RX ADMIN — FLUPHENAZINE HYDROCHLORIDE 5 MILLIGRAM(S): 1 TABLET, FILM COATED ORAL at 08:21

## 2019-10-10 PROCEDURE — 90870 ELECTROCONVULSIVE THERAPY: CPT

## 2019-10-10 RX ORDER — FLUPHENAZINE HYDROCHLORIDE 1 MG/1
50 TABLET, FILM COATED ORAL ONCE
Refills: 0 | Status: COMPLETED | OUTPATIENT
Start: 2019-10-10 | End: 2019-10-10

## 2019-10-10 RX ADMIN — AMLODIPINE BESYLATE 5 MILLIGRAM(S): 2.5 TABLET ORAL at 08:46

## 2019-10-10 RX ADMIN — DIVALPROEX SODIUM 500 MILLIGRAM(S): 500 TABLET, DELAYED RELEASE ORAL at 08:46

## 2019-10-10 RX ADMIN — Medication 0.5 MILLIGRAM(S): at 20:49

## 2019-10-10 RX ADMIN — Medication 0.5 MILLIGRAM(S): at 08:46

## 2019-10-10 RX ADMIN — Medication 200 MILLIGRAM(S): at 04:07

## 2019-10-10 RX ADMIN — Medication 200 MILLIGRAM(S): at 20:49

## 2019-10-10 RX ADMIN — FLUPHENAZINE HYDROCHLORIDE 5 MILLIGRAM(S): 1 TABLET, FILM COATED ORAL at 08:46

## 2019-10-10 RX ADMIN — PANTOPRAZOLE SODIUM 40 MILLIGRAM(S): 20 TABLET, DELAYED RELEASE ORAL at 06:41

## 2019-10-10 RX ADMIN — DIVALPROEX SODIUM 500 MILLIGRAM(S): 500 TABLET, DELAYED RELEASE ORAL at 20:49

## 2019-10-10 RX ADMIN — FLUPHENAZINE HYDROCHLORIDE 5 MILLIGRAM(S): 1 TABLET, FILM COATED ORAL at 20:49

## 2019-10-10 RX ADMIN — SODIUM CHLORIDE 1 GRAM(S): 9 INJECTION INTRAMUSCULAR; INTRAVENOUS; SUBCUTANEOUS at 20:49

## 2019-10-10 RX ADMIN — SODIUM CHLORIDE 1 GRAM(S): 9 INJECTION INTRAMUSCULAR; INTRAVENOUS; SUBCUTANEOUS at 12:56

## 2019-10-10 RX ADMIN — Medication 2 MILLIGRAM(S): at 20:49

## 2019-10-10 RX ADMIN — LISINOPRIL 5 MILLIGRAM(S): 2.5 TABLET ORAL at 08:46

## 2019-10-10 RX ADMIN — FLUPHENAZINE HYDROCHLORIDE 50 MILLIGRAM(S): 1 TABLET, FILM COATED ORAL at 18:16

## 2019-10-10 RX ADMIN — SODIUM CHLORIDE 1 GRAM(S): 9 INJECTION INTRAMUSCULAR; INTRAVENOUS; SUBCUTANEOUS at 08:46

## 2019-10-11 VITALS — TEMPERATURE: 98 F

## 2019-10-11 LAB
ANION GAP SERPL CALC-SCNC: 8 MMO/L — SIGNIFICANT CHANGE UP (ref 7–14)
BUN SERPL-MCNC: 18 MG/DL — SIGNIFICANT CHANGE UP (ref 7–23)
CALCIUM SERPL-MCNC: 8.8 MG/DL — SIGNIFICANT CHANGE UP (ref 8.4–10.5)
CHLORIDE SERPL-SCNC: 92 MMOL/L — LOW (ref 98–107)
CO2 SERPL-SCNC: 26 MMOL/L — SIGNIFICANT CHANGE UP (ref 22–31)
CREAT SERPL-MCNC: 1.02 MG/DL — SIGNIFICANT CHANGE UP (ref 0.5–1.3)
GLUCOSE SERPL-MCNC: 133 MG/DL — HIGH (ref 70–99)
POTASSIUM SERPL-MCNC: 5.2 MMOL/L — SIGNIFICANT CHANGE UP (ref 3.5–5.3)
POTASSIUM SERPL-SCNC: 5.2 MMOL/L — SIGNIFICANT CHANGE UP (ref 3.5–5.3)
SODIUM SERPL-SCNC: 126 MMOL/L — LOW (ref 135–145)

## 2019-10-11 PROCEDURE — 99238 HOSP IP/OBS DSCHRG MGMT 30/<: CPT

## 2019-10-11 RX ORDER — AMLODIPINE BESYLATE 2.5 MG/1
1 TABLET ORAL
Qty: 0 | Refills: 0 | DISCHARGE

## 2019-10-11 RX ORDER — CLONAZEPAM 1 MG
1 TABLET ORAL
Qty: 0 | Refills: 0 | DISCHARGE

## 2019-10-11 RX ORDER — LISINOPRIL 2.5 MG/1
1 TABLET ORAL
Qty: 30 | Refills: 0
Start: 2019-10-11 | End: 2019-11-09

## 2019-10-11 RX ORDER — BENZTROPINE MESYLATE 1 MG
1 TABLET ORAL
Qty: 0 | Refills: 0 | DISCHARGE

## 2019-10-11 RX ORDER — AMLODIPINE BESYLATE 2.5 MG/1
1 TABLET ORAL
Qty: 30 | Refills: 0
Start: 2019-10-11 | End: 2019-11-09

## 2019-10-11 RX ORDER — DIVALPROEX SODIUM 500 MG/1
2 TABLET, DELAYED RELEASE ORAL
Qty: 0 | Refills: 0 | DISCHARGE

## 2019-10-11 RX ORDER — BENZTROPINE MESYLATE 1 MG
1 TABLET ORAL
Qty: 60 | Refills: 0
Start: 2019-10-11 | End: 2019-11-09

## 2019-10-11 RX ORDER — FLUPHENAZINE HYDROCHLORIDE 1 MG/1
25 TABLET, FILM COATED ORAL
Qty: 0 | Refills: 0 | DISCHARGE

## 2019-10-11 RX ORDER — FLUPHENAZINE HYDROCHLORIDE 1 MG/1
1 TABLET, FILM COATED ORAL
Qty: 60 | Refills: 0
Start: 2019-10-11 | End: 2019-11-09

## 2019-10-11 RX ORDER — SODIUM CHLORIDE 9 MG/ML
1 INJECTION INTRAMUSCULAR; INTRAVENOUS; SUBCUTANEOUS
Qty: 45 | Refills: 0
Start: 2019-10-11 | End: 2019-10-25

## 2019-10-11 RX ORDER — CLONAZEPAM 1 MG
1 TABLET ORAL
Qty: 30 | Refills: 0
Start: 2019-10-11 | End: 2019-11-09

## 2019-10-11 RX ORDER — DIVALPROEX SODIUM 500 MG/1
1 TABLET, DELAYED RELEASE ORAL
Qty: 60 | Refills: 0
Start: 2019-10-11 | End: 2019-11-09

## 2019-10-11 RX ORDER — PANTOPRAZOLE SODIUM 20 MG/1
1 TABLET, DELAYED RELEASE ORAL
Qty: 30 | Refills: 0
Start: 2019-10-11 | End: 2019-11-09

## 2019-10-11 RX ORDER — PANTOPRAZOLE SODIUM 20 MG/1
1 TABLET, DELAYED RELEASE ORAL
Qty: 0 | Refills: 0 | DISCHARGE

## 2019-10-11 RX ADMIN — AMLODIPINE BESYLATE 5 MILLIGRAM(S): 2.5 TABLET ORAL at 09:10

## 2019-10-11 RX ADMIN — Medication 0.5 MILLIGRAM(S): at 09:10

## 2019-10-11 RX ADMIN — PANTOPRAZOLE SODIUM 40 MILLIGRAM(S): 20 TABLET, DELAYED RELEASE ORAL at 06:46

## 2019-10-11 RX ADMIN — FLUPHENAZINE HYDROCHLORIDE 5 MILLIGRAM(S): 1 TABLET, FILM COATED ORAL at 09:10

## 2019-10-11 RX ADMIN — DIVALPROEX SODIUM 500 MILLIGRAM(S): 500 TABLET, DELAYED RELEASE ORAL at 09:10

## 2019-10-11 RX ADMIN — SODIUM CHLORIDE 1 GRAM(S): 9 INJECTION INTRAMUSCULAR; INTRAVENOUS; SUBCUTANEOUS at 09:10

## 2019-10-11 RX ADMIN — LISINOPRIL 5 MILLIGRAM(S): 2.5 TABLET ORAL at 09:10

## 2019-10-18 NOTE — H&P ADULT - NSHPPHYSICALEXAM_GEN_ALL_CORE
ICU Vital Signs Last 24 Hrs  T(C): 37.1 (12 Apr 2018 12:57), Max: 37.1 (12 Apr 2018 12:57)  T(F): 98.7 (12 Apr 2018 12:57), Max: 98.7 (12 Apr 2018 12:57)  HR: 69 (12 Apr 2018 15:31) (69 - 76)  BP: 156/70 (12 Apr 2018 15:31) (156/70 - 185/82)  BP(mean): --  ABP: --  ABP(mean): --  RR: 16 (12 Apr 2018 15:31) (16 - 18)  SpO2: 99% (12 Apr 2018 15:31) (99% - 99%) No

## 2019-12-12 NOTE — ED ADULT NURSE NOTE - BREATHING, MLM
COLONOSCOPY PROCEDURE NOTE        NAME:   Demetris Ayala    PREOPERATIVE DIAGNOSIS   Personal Hx of Colonic Polyps, last colonoscopy was 5  years ago    PROCEDURE    Colonoscopy diagnostic    POSTOPERATIVE DIAGNOSIS    Normal Colonscopy    SEDATION      IV Sedation    Sedation Start time: 11 33    Sedation monitoring time: 0 Hr 22 Min 2 Sec    Fentanyl 50 mcg, Versed 6 mg, Benadryl 0 mg    Moderate sedation was administered with continuous monitoring of the patient by a trained caregiver under my direct supervision. Please refer to nursing documentation for doses of medications administered intravenously as well as the patient’s status during the procedure. An independent observer was present through the sedation process. Total sedation (intra-service) time was 30 minutes.      Event Time In Time Out   In Facility 1003    In Pre-Procedure 1014    Physician Available     Anesthesia Available     Pre-Procedure Complete 1048    Out of Pre-Procedure     Ready for Transport     In Holding     In Block Room     Out of Block Room     Anesthesia Start     In Room 1130    Procedure Start 1139    Closing     Procedure Finish 1149    Out of Room 1152    In PACU     Anesthesia Finish     PACU Care Complete     Out of Phase I     In Phase II 1155    Phase II Care Complete 1225    Procedural Care Complete     Block Start     Block Stop     Transport Complete         Colonoscope Withdrawal time > 6 minutes: Yes      SEDATING & PERFORMING PHYSICIAN:  REINALDO ROSAS    SURGICAL ASSISTANTS: NONE     PROCEDURE DETAILS    Informed consent was obtained for the procedure, including sedation.  Risks of perforation, hemorrhage, adverse drug reaction and aspiration were discussed. The patient verbalized understanding and wanted to proceed. Informed written consent was obtained. The patient was placed in the left lateral decubitus position.  Based on the pre-procedure assessment, including review of the patient's medical  history, medications, allergies, and review of systems, the patient had been deemed to be an appropriate candidate for conscious sedation; he was therefore sedated with the medications listed above.   The patient was monitored continuously with ECG tracing, pulse oximetry, blood pressure monitoring, and direct observations.     A rectal examination was performed.  No masses were felt.  The colonoscope was inserted into the rectum and advanced under direct vision to the terminal ileum.   A careful inspection was made as the colonoscope was withdrawn, including a retroflexed view of the rectum; findings and interventions are described below.  Appropriate photo documentation was obtained. An AmplifEYE was used at the tip of the colonoscope to enhance polyp detection.     Enhanced measures for visualization of the right colon:  Measures to enhance visualization of the right colon included retroflexion and duplicate examination of the right colon.     QUALITY OF PREP: (Phelps Prep Scale)    Right Colon: 3  Transverse Colon:3  Left Colon:3    0 = Unprepared colon segment with mucosa not seen due to solid stool that cannot be cleared.  1 = Portion of mucosa of the colon segment seen, but other areas of the colon segment not well seen due to staining, residual stool and/or opaque liquid. 2 = Minor amount of residual staining, small fragments of stool and/or opaque liquid, but mucosa of colon segment seen well.3 = Entire mucosa of colon segment seen well with no residual staining, small fragments of stool or opaque liquid.     FINDINGS    Normal Colonoscopy  No polyps or mass lesions seen    SPECIMENS  None      ESTIMATED BLOOD LOSS:   None    COMPLICATIONS    None    DISPOSITION      Repeat Colonoscopy in 5 years for a history of colon polyps    CONDITION    Stable      [place] HERNANDEZ     Spontaneous, unlabored and symmetrical

## 2020-07-08 NOTE — DISCHARGE NOTE PROVIDER - NSDCQMSTAIRS_GEN_ALL_CORE
"Refill Authorization Note    is requesting a refill authorization.    Brief assessment and rationale for refill: APPROVE: prr          Medication Therapy Plan: approve 12 more    Medication reconciliation completed: No                         Comments:      Requested Prescriptions   Signed Prescriptions Disp Refills    pen needle, diabetic (SURE COMFORT PEN NEEDLE) 31 gauge x 3/16" Ndle 400 each 3     Sig: USE FOUR TIMES DAILY       Endocrinology: Diabetes - Supplies Failed - 7/7/2020 10:41 AM        Failed - HBA1C is 7.9 or below and within 180 days     Hemoglobin A1C   Date Value Ref Range Status   10/14/2019 9.9 (H) 4.0 - 5.6 % Final     Comment:     ADA Screening Guidelines:  5.7-6.4%  Consistent with prediabetes  >or=6.5%  Consistent with diabetes  High levels of fetal hemoglobin interfere with the HbA1C  assay. Heterozygous hemoglobin variants (HbS, HgC, etc)do  not significantly interfere with this assay.   However, presence of multiple variants may affect accuracy.     09/10/2018 10.0 (H) 4.0 - 5.6 % Final     Comment:     ADA Screening Guidelines:  5.7-6.4%  Consistent with prediabetes  >or=6.5%  Consistent with diabetes  High levels of fetal hemoglobin interfere with the HbA1C  assay. Heterozygous hemoglobin variants (HbS, HgC, etc)do  not significantly interfere with this assay.   However, presence of multiple variants may affect accuracy.     08/08/2017 9.4 (H) 4.0 - 5.6 % Final     Comment:     According to ADA guidelines, hemoglobin A1c <7.0% represents  optimal control in non-pregnant diabetic patients. Different  metrics may apply to specific patient populations.   Standards of Medical Care in Diabetes-2016.  For the purpose of screening for the presence of diabetes:  <5.7%     Consistent with the absence of diabetes  5.7-6.4%  Consistent with increasing risk for diabetes   (prediabetes)  >or=6.5%  Consistent with diabetes  Currently, no consensus exists for use of hemoglobin A1c  for " diagnosis of diabetes for children.  This Hemoglobin A1c assay has significant interference with fetal   hemoglobin   (HbF). The results are invalid for patients with abnormal amounts of   HbF,   including those with known Hereditary Persistence   of Fetal Hemoglobin. Heterozygous hemoglobin variants (HbAS, HbAC,   HbAD, HbAE, HbA2) do not significantly interfere with this assay;   however, presence of multiple variants in a sample may impact the %   interference.                Passed - Patient is at least 18 years old        Passed - Office visit in past 12 months or future 90 days.     Recent Outpatient Visits            8 months ago Need for pneumococcal vaccination    [facility] Medicine Gemma Boone MD    1 year ago Routine general medical examination at a health care facility    [facility] Medicine Gemma Boone MD    2 years ago Dysphagia, unspecified type    [facility] - Neurology Ale Adams NP    2 years ago Routine general medical examination at a health care facility    [facility]  Internal Yayo Boone MD    3 years ago Type 1 diabetes mellitus without complication    [facility] Theresa Dubose, ISAEL                        Appointments  past 12m or future 3m with PCP    Date Provider   Last Visit   10/21/2019 Gemma Boone MD   Next Visit   Visit date not found Gemma Boone MD   ED visits in past 90 days: 0     Note composed:12:29 PM 07/08/2020           No

## 2020-08-20 NOTE — BEHAVIORAL HEALTH ASSESSMENT NOTE - NSBHCONSULTWRKUPYES_PSY_A_CORE
labs Cheek Interpolation Flap Text: A decision was made to reconstruct the defect utilizing an interpolation axial flap and a staged reconstruction.  A telfa template was made of the defect.  This telfa template was then used to outline the Cheek Interpolation flap.  The donor area for the pedicle flap was then injected with anesthesia.  The flap was excised through the skin and subcutaneous tissue down to the layer of the underlying musculature.  The interpolation flap was carefully excised within this deep plane to maintain its blood supply.  The edges of the donor site were undermined.   The donor site was closed in a primary fashion.  The pedicle was then rotated into position and sutured.  Once the tube was sutured into place, adequate blood supply was confirmed with blanching and refill.  The pedicle was then wrapped with xeroform gauze and dressed appropriately with a telfa and gauze bandage to ensure continued blood supply and protect the attached pedicle.

## 2020-08-26 NOTE — DISCHARGE NOTE ADULT - PHYSICIAN SECTION COMPLETE
Please see recent office visit with Dr Jeffery Kee for full details  Briefly this patient is an 69-year-old male with sick sinus syndrome with Medtronic dual-chamber pacemaker in situ, paroxysmal atrial fibrillation maintained on propafenone and Coumadin, hypertension, hyperlipidemia, and preserved LV systolic function  Through routine device interrogations he was found to have reached elective replacement indicator, and a pacemaker generator change was recommended  He presents today to undergo undergo that procedure  He denies all cardiac complaints, physical exam unchanged      Vitals:    08/26/20 0907   BP: 129/75   Pulse: 66   Resp: 18   Temp: 98 2 °F (36 8 °C)   SpO2: 95% Yes

## 2021-01-24 NOTE — DISCHARGE NOTE ADULT - SMOKING EVEN A SINGLE PUFF INCREASES THE LIKELIHOOD OF A FULL RELAPSE, WITHDRAWAL SYMPTOMS PEAK WITHIN 1-2 WEEKS, BUT CAN PERSIST FOR MONTHS
Pt presenting for LLE pain/injury. Patient went ice skating yesterday and fell backwards. She was unable to bear weight at the time but mom gave some motrin, ice and elevation. She woke up this morning with worsening pain and had swelling of the left lower leg and ankle. Pt was seen at urgent care and xray showed a displaced tibial fracture. She was sent to ED for evaluation.
Statement Selected

## 2021-04-09 DIAGNOSIS — Z01.818 ENCOUNTER FOR OTHER PREPROCEDURAL EXAMINATION: ICD-10-CM

## 2021-04-10 ENCOUNTER — APPOINTMENT (OUTPATIENT)
Dept: DISASTER EMERGENCY | Facility: CLINIC | Age: 66
End: 2021-04-10

## 2021-04-11 LAB — SARS-COV-2 N GENE NPH QL NAA+PROBE: NOT DETECTED

## 2021-04-12 NOTE — H&P PST ADULT - HISTORY OF PRESENT ILLNESS
This is a 67 y/o male with h/o schizophrenia who was referred for a cardiac evaluation secondary to an abnormal EKG with nonspecific T wave changes.  Occasionally, he has had intermittent episodes of chest tightness that lasts for seconds when sleeping.  He has had intermittent palpitations.  He reports that he had the COVID vaccine at [facility] recently.  He is a poor historian.  He denies dyspnea, PND, or syncope.  NST revealed small zone of inferobasal ischemia.  He presents today for [facility] for further evaluation.    ASA   Mallampati   GFR  BRA  COVID negative    Symptoms:        Angina (Class):        Ischemic Symptoms:     Heart Failure:        Systolic/Diastolic/Combined:        NYHA Class (within 2 weeks):     Assessment of LVEF (Must be within 6 months):       EF: 64%       Assessed by: TTE       Date: 3/10/21    Prior Cardiac Interventions ([facility], stents, CABG): NONE       Noninvasive Testing:   Stress Test: Date: 3/19/21       Protocol: Frankie       Duration of Exercise: N/A       Symptoms: NONE       EKG Changes: None       DTS: N/A       Myocardial Imaging: small zone of inferobasal ischemia       Risk Assessment (Low, Medium, High): medium    Echo (Date, Findings): 3/19/21: Normal LVSF; left ventricular filling patten is abnormal from impaired relaxation; Grade 1 diastolic dysfunction; mild valvular abnormality; EF 64%    Antianginal Therapies:        Calcium Channel Blockers: Norvasc    Associated Risk Factors:        Cerebrovascular Disease: N/A       Chronic Lung Disease: N/A       Peripheral Arterial Disease: N/A       Chronic Kidney Disease (if yes, what is GFR): N/A       Uncontrolled Diabetes (if yes, what is HgbA1C or FBS): N/A       Poorly Controlled Hypertension (if yes, what is SBP): N/A       Morbid Obesity (if yes, what is BMI): N/A       History of Recent Ventricular Arrhythmia: N/A       Inability to Ambulate Safely: N/A       Need for Therapeutic Anticoagulation: N/A       Antiplatelet or Contrast Allergy: N/A This is a 65 y/o male with h/o schizophrenia who was referred for a cardiac evaluation secondary to an abnormal EKG with nonspecific T wave changes.  Occasionally, he has had intermittent episodes of chest tightness that lasts for seconds when sleeping.  He has had intermittent palpitations.  He reports that he had the COVID vaccine at [facility] recently.  He is a poor historian.  He denies dyspnea, PND, or syncope.  NST revealed small zone of inferobasal ischemia.  He presents today for [facility] for further evaluation.    ASA   2  Mallampati   2  GFR    BRA  COVID negative    Symptoms:        Angina (Class):   Ii       Ischemic Symptoms: chest pressure     Heart Failure:        Systolic/Diastolic/Combined:  No       NYHA Class (within 2 weeks):  No     Assessment of LVEF (Must be within 6 months):       EF: 64%       Assessed by: TTE       Date: 3/10/21    Prior Cardiac Interventions ([facility], stents, CABG): NONE       Noninvasive Testing:   Stress Test: Date: 3/19/21       Protocol: Frankie       Duration of Exercise: N/A       Symptoms: NONE       EKG Changes: None       DTS: N/A       Myocardial Imaging: small zone of inferobasal ischemia       Risk Assessment (Low, Medium, High): medium    Echo (Date, Findings): 3/19/21: Normal LVSF; left ventricular filling patten is abnormal from impaired relaxation; Grade 1 diastolic dysfunction; mild valvular abnormality; EF 64%    Antianginal Therapies:        Calcium Channel Blockers: Norvasc    Associated Risk Factors:        Cerebrovascular Disease: N/A       Chronic Lung Disease: N/A       Peripheral Arterial Disease: N/A       Chronic Kidney Disease (if yes, what is GFR): N/A       Uncontrolled Diabetes (if yes, what is HgbA1C or FBS): N/A       Poorly Controlled Hypertension (if yes, what is SBP): N/A       Morbid Obesity (if yes, what is BMI): N/A       History of Recent Ventricular Arrhythmia: N/A       Inability to Ambulate Safely: N/A       Need for Therapeutic Anticoagulation: N/A       Antiplatelet or Contrast Allergy: N/A

## 2021-04-12 NOTE — H&P PST ADULT - NEGATIVE NEUROLOGICAL SYMPTOMS
no transient paralysis/no weakness/no generalized seizures/no focal seizures/no syncope/no vertigo/no headache

## 2021-04-12 NOTE — H&P PST ADULT - RS GEN PE MLT RESP DETAILS PC
airway patent/breath sounds equal/good air movement/clear to auscultation bilaterally/no rhonchi/no wheezes

## 2021-04-12 NOTE — H&P PST ADULT - ASSESSMENT
67 y/o male with h/o schizophrenia who was referred for a cardiac evaluation secondary to an abnormal EKG with nonspecific T wave changes.  Occasionally, he has had intermittent episodes of chest tightness that lasts for seconds when sleeping.  He has had intermittent palpitations.  He reports that he had the COVID vaccine at [facility] recently.  He is a poor historian.  He denies dyspnea, PND, or syncope.  NST revealed small zone of inferobasal ischemia.  He presents today for [facility] for further evaluation.      -Patient seen and examined  -Labs and EKG reviewed   -Pre-procedure teaching completed with patient, questions answered   -Informed consent to be obtained by attending   -pt instructed IF STENT PLACED WILL REQUIRE TO STAY OVERNIGHT FOR MONITORING AND DISCHARGED IN THE AM    -cardiac rehab info provided if stent is placed recommended to start if needed post PCI

## 2021-04-13 ENCOUNTER — TRANSCRIPTION ENCOUNTER (OUTPATIENT)
Age: 66
End: 2021-04-13

## 2021-04-13 ENCOUNTER — OUTPATIENT (OUTPATIENT)
Dept: OUTPATIENT SERVICES | Facility: HOSPITAL | Age: 66
LOS: 1 days | End: 2021-04-13
Payer: MEDICARE

## 2021-04-13 VITALS
RESPIRATION RATE: 16 BRPM | SYSTOLIC BLOOD PRESSURE: 133 MMHG | DIASTOLIC BLOOD PRESSURE: 67 MMHG | OXYGEN SATURATION: 98 % | HEART RATE: 57 BPM

## 2021-04-13 VITALS
TEMPERATURE: 98 F | DIASTOLIC BLOOD PRESSURE: 77 MMHG | SYSTOLIC BLOOD PRESSURE: 156 MMHG | RESPIRATION RATE: 18 BRPM | HEART RATE: 62 BPM | HEIGHT: 64 IN | WEIGHT: 223.11 LBS | OXYGEN SATURATION: 98 %

## 2021-04-13 DIAGNOSIS — R94.39 ABNORMAL RESULT OF OTHER CARDIOVASCULAR FUNCTION STUDY: ICD-10-CM

## 2021-04-13 LAB
ANION GAP SERPL CALC-SCNC: 12 MMOL/L — SIGNIFICANT CHANGE UP (ref 5–17)
APTT BLD: 28.9 SEC — SIGNIFICANT CHANGE UP (ref 27.5–35.5)
BASOPHILS # BLD AUTO: 0.03 K/UL — SIGNIFICANT CHANGE UP (ref 0–0.2)
BASOPHILS NFR BLD AUTO: 0.4 % — SIGNIFICANT CHANGE UP (ref 0–2)
BLD GP AB SCN SERPL QL: SIGNIFICANT CHANGE UP
BUN SERPL-MCNC: 13 MG/DL — SIGNIFICANT CHANGE UP (ref 8–20)
CALCIUM SERPL-MCNC: 9.1 MG/DL — SIGNIFICANT CHANGE UP (ref 8.6–10.2)
CHLORIDE SERPL-SCNC: 96 MMOL/L — LOW (ref 98–107)
CO2 SERPL-SCNC: 26 MMOL/L — SIGNIFICANT CHANGE UP (ref 22–29)
CREAT SERPL-MCNC: 0.91 MG/DL — SIGNIFICANT CHANGE UP (ref 0.5–1.3)
EOSINOPHIL # BLD AUTO: 0.11 K/UL — SIGNIFICANT CHANGE UP (ref 0–0.5)
EOSINOPHIL NFR BLD AUTO: 1.4 % — SIGNIFICANT CHANGE UP (ref 0–6)
GLUCOSE SERPL-MCNC: 87 MG/DL — SIGNIFICANT CHANGE UP (ref 70–99)
HCT VFR BLD CALC: 38.7 % — LOW (ref 39–50)
HGB BLD-MCNC: 13.3 G/DL — SIGNIFICANT CHANGE UP (ref 13–17)
IMM GRANULOCYTES NFR BLD AUTO: 0.4 % — SIGNIFICANT CHANGE UP (ref 0–1.5)
INR BLD: 1.07 RATIO — SIGNIFICANT CHANGE UP (ref 0.88–1.16)
LYMPHOCYTES # BLD AUTO: 2.72 K/UL — SIGNIFICANT CHANGE UP (ref 1–3.3)
LYMPHOCYTES # BLD AUTO: 35.2 % — SIGNIFICANT CHANGE UP (ref 13–44)
MCHC RBC-ENTMCNC: 29.4 PG — SIGNIFICANT CHANGE UP (ref 27–34)
MCHC RBC-ENTMCNC: 34.4 GM/DL — SIGNIFICANT CHANGE UP (ref 32–36)
MCV RBC AUTO: 85.4 FL — SIGNIFICANT CHANGE UP (ref 80–100)
MONOCYTES # BLD AUTO: 1.15 K/UL — HIGH (ref 0–0.9)
MONOCYTES NFR BLD AUTO: 14.9 % — HIGH (ref 2–14)
NEUTROPHILS # BLD AUTO: 3.68 K/UL — SIGNIFICANT CHANGE UP (ref 1.8–7.4)
NEUTROPHILS NFR BLD AUTO: 47.7 % — SIGNIFICANT CHANGE UP (ref 43–77)
PLATELET # BLD AUTO: 189 K/UL — SIGNIFICANT CHANGE UP (ref 150–400)
POTASSIUM SERPL-MCNC: 5.1 MMOL/L — SIGNIFICANT CHANGE UP (ref 3.5–5.3)
POTASSIUM SERPL-SCNC: 5.1 MMOL/L — SIGNIFICANT CHANGE UP (ref 3.5–5.3)
PROTHROM AB SERPL-ACNC: 12.4 SEC — SIGNIFICANT CHANGE UP (ref 10.6–13.6)
RBC # BLD: 4.53 M/UL — SIGNIFICANT CHANGE UP (ref 4.2–5.8)
RBC # FLD: 14.6 % — HIGH (ref 10.3–14.5)
SODIUM SERPL-SCNC: 134 MMOL/L — LOW (ref 135–145)
WBC # BLD: 7.72 K/UL — SIGNIFICANT CHANGE UP (ref 3.8–10.5)
WBC # FLD AUTO: 7.72 K/UL — SIGNIFICANT CHANGE UP (ref 3.8–10.5)

## 2021-04-13 PROCEDURE — 85610 PROTHROMBIN TIME: CPT

## 2021-04-13 PROCEDURE — 36415 COLL VENOUS BLD VENIPUNCTURE: CPT

## 2021-04-13 PROCEDURE — 93010 ELECTROCARDIOGRAM REPORT: CPT

## 2021-04-13 PROCEDURE — C1769: CPT

## 2021-04-13 PROCEDURE — C1760: CPT

## 2021-04-13 PROCEDURE — 93005 ELECTROCARDIOGRAM TRACING: CPT

## 2021-04-13 PROCEDURE — C1887: CPT

## 2021-04-13 PROCEDURE — 86850 RBC ANTIBODY SCREEN: CPT

## 2021-04-13 PROCEDURE — 86900 BLOOD TYPING SEROLOGIC ABO: CPT

## 2021-04-13 PROCEDURE — 80048 BASIC METABOLIC PNL TOTAL CA: CPT

## 2021-04-13 PROCEDURE — 85025 COMPLETE CBC W/AUTO DIFF WBC: CPT

## 2021-04-13 PROCEDURE — 86901 BLOOD TYPING SEROLOGIC RH(D): CPT

## 2021-04-13 PROCEDURE — 85730 THROMBOPLASTIN TIME PARTIAL: CPT

## 2021-04-13 PROCEDURE — C1894: CPT

## 2021-04-13 PROCEDURE — 93458 L HRT ARTERY/VENTRICLE ANGIO: CPT

## 2021-04-13 RX ORDER — ASPIRIN/CALCIUM CARB/MAGNESIUM 324 MG
81 TABLET ORAL ONCE
Refills: 0 | Status: COMPLETED | OUTPATIENT
Start: 2021-04-13 | End: 2021-04-13

## 2021-04-13 RX ADMIN — Medication 81 MILLIGRAM(S): at 13:02

## 2021-04-13 NOTE — CONSULT NOTE ADULT - SUBJECTIVE AND OBJECTIVE BOX
Patient is a 66y old  Male who presents with a chief complaint of abnormal nuclear stress test      HPI:  This is a 67 y/o male with h/o schizophrenia who was referred for a cardiac evaluation secondary to an abnormal EKG with nonspecific T wave changes.  Occasionally, he has had intermittent episodes of chest tightness that lasts for seconds when sleeping.  He has had intermittent palpitations.  He reports that he had the COVID vaccine at [facility] recently.  He is a poor historian.  He denies dyspnea, PND, or syncope.  NST revealed small zone of inferobasal ischemia.         Noninvasive Testing:   Stress Test: Date: 3/19/21       Protocol: Frankie       Duration of Exercise: N/A       Symptoms: NONE       EKG Changes: None       DTS: N/A       Myocardial Imaging: small zone of inferobasal ischemia       Risk Assessment (Low, Medium, High): medium    Echo (Date, Findings): 3/19/21: Normal LVSF; left ventricular filling patten is abnormal from impaired relaxation; Grade 1 diastolic dysfunction; mild valvular abnormality; EF 64%      PAST MEDICAL & SURGICAL HISTORY:  Other schizophrenia    Bipolar 1 disorder    Bipolar 1 disorder    Poor historian    bipolar disorder  Schizophrenia   GERD  Chronic hyponatremia   HTN    No significant past surgical history    Allergies    No Known Allergies    Intolerances        MEDICATIONS  (STANDING):  Haldol  Norvasc 10 mg daily  Cetrizine  Pantoprazole  Drisdol  Depakote    FAMILY HISTORY:  No pertinent family history in first degree relatives    Family history of diabetes mellitus (DM)        SOCIAL HISTORY:    CIGARETTES:  Former    ALCOHOL:  Occasional    REVIEW OF SYSTEMS:  CONSTITUTIONAL: No fever, weight loss, or fatigue  EYES: No eye pain, visual disturbances, or discharge  ENMT:  No difficulty hearing, tinnitus, vertigo; No sinus or throat pain  NECK: No pain or stiffness  RESPIRATORY: No cough, wheezing, chills or hemoptysis; No Shortness of Breath  CARDIOVASCULAR: No palpitations, passing out, dizziness, or leg swelling  GASTROINTESTINAL: No abdominal or epigastric pain. No nausea, vomiting, or hematemesis; No diarrhea or constipation. No melena or hematochezia.  GENITOURINARY: No dysuria, frequency, hematuria, or incontinence  NEUROLOGICAL: No headaches, memory loss, loss of strength, numbness, or tremors  SKIN: No itching, burning, rashes, or lesions   LYMPH Nodes: No enlarged glands  ENDOCRINE: No heat or cold intolerance; No hair loss  MUSCULOSKELETAL: No joint pain or swelling; No muscle, back, or extremity pain  PSYCHIATRIC: No depression, anxiety, mood swings, or difficulty sleeping  HEME/LYMPH: No easy bruising, or bleeding gums  ALLERY AND IMMUNOLOGIC: No hives or eczema	    Vital Signs Last 24 Hrs  T(C): 36.8 (13 Apr 2021 16:30), Max: 36.8 (13 Apr 2021 12:00)  T(F): 98.2 (13 Apr 2021 16:30), Max: 98.2 (13 Apr 2021 12:00)  HR: 57 (13 Apr 2021 17:30) (52 - 65)  BP: 133/67 (13 Apr 2021 17:30) (121/60 - 157/76)  BP(mean): --  RR: 16 (13 Apr 2021 17:30) (16 - 18)  SpO2: 98% (13 Apr 2021 17:30) (98% - 100%)    Daily Height in cm: 162.56 (13 Apr 2021 12:00)    Daily     I&O's Detail    13 Apr 2021 07:01  -  13 Apr 2021 23:49  --------------------------------------------------------  IN:  Total IN: 0 mL    OUT:    Voided (mL): 500 mL  Total OUT: 500 mL    Total NET: -500 mL          PHYSICAL EXAM:  Appearance: Normal, well nourished	  HEENT:   Normal oral mucosa, PERRL, EOMI, sclera non-icteric	  Lymphatic: No cervical lymphadenopathy  Cardiovascular: Normal S1 S2, No JVD, No cardiac murmurs, No carotid bruits, No peripheral edema  Respiratory: Lungs clear to auscultation	  Psychiatry: A & O x 3, Mood & affect appropriate  Gastrointestinal:  Soft, Non-tender, + BS, no bruits	  Skin: No rashes, No ecchymoses, No cyanosis  Neurologic: Grossly non-focal with full strength in all four extremities  Extremities: Normal range of motion, No clubbing, cyanosis or edema  Vascular: Peripheral pulses palpable 2+ bilaterally    LABS:                        13.3   7.72  )-----------( 189      ( 13 Apr 2021 12:28 )             38.7     04-13    134<L>  |  96<L>  |  13.0  ----------------------------<  87  5.1   |  26.0  |  0.91    Ca    9.1      13 Apr 2021 12:28          PT/INR - ( 13 Apr 2021 12:28 )   PT: 12.4 sec;   INR: 1.07 ratio         PTT - ( 13 Apr 2021 12:28 )  PTT:28.9 sec    I&O's Summary    13 Apr 2021 07:01  -  13 Apr 2021 23:49  --------------------------------------------------------  IN: 0 mL / OUT: 500 mL / NET: -500 mL      BNP  RADIOLOGY & ADDITIONAL STUDIES:    Assessment:  This is a 67 y/o male with h/o schizophrenia who was referred for a cardiac evaluation secondary to an abnormal EKG with nonspecific T wave changes.  Occasionally, he has had intermittent episodes of chest tightness that lasts for seconds when sleeping.  He has had intermittent palpitations.  He reports that he had the COVID vaccine at [facility] recently.  He is a poor historian.  He denies dyspnea, PND, or syncope.  NST revealed small zone of inferobasal ischemia.      Plan:  Cardiac catheterization and possible percutaneous intervention recommended.  Risks, benefits, and alternatives reviewed.  Risks including but not limited to MI, death, stroke, bleeding, infection, vessel injury, hematoma, renal failure, allergic reaction, urgent open heart surgery, restenosis and stent thrombosis were reviewed.  All questions answered.  Patient is agreeable to proceed.

## 2021-04-13 NOTE — PROGRESS NOTE ADULT - SUBJECTIVE AND OBJECTIVE BOX
Post Cardiac Cath NP Note       Narrative:    67 y/o male with h/o schizophrenia who was referred for a cardiac evaluation secondary to an abnormal EKG with nonspecific T wave changes.  Occasionally, he has had intermittent episodes of chest tightness that lasts for seconds when sleeping.  He has had intermittent palpitations.  He reports that he had the COVID vaccine at [facility] recently.  He is a poor historian.  He denies dyspnea, PND, or syncope.  NST revealed small zone of inferobasal ischemia.  He presented today for [facility] for further evaluation which showed normal coronaries.       PAST MEDICAL & SURGICAL HISTORY:  Other schizophrenia  Bipolar 1 disorder  Poor historian  bipolar disorder  Schizophrenia   GERD  Chronic hyponatremia   HTN      Home Medications:  amLODIPine 10 mg oral tablet: 1 tab(s) orally once a day (13 Apr 2021 12:08)  Aspir 81 oral delayed release tablet: 1 tab(s) orally once a day (13 Apr 2021 12:08)  cetirizine 10 mg oral tablet: 1 tab(s) orally once a day (13 Apr 2021 12:08)  clonazePAM 0.5 mg oral tablet: 1 tab(s) orally 2 times a day (13 Apr 2021 12:08)  Depakote 500 mg oral delayed release tablet: 2 tab(s) orally 2 times a day (13 Apr 2021 12:08)  Haldol: 100 milligram(s) injectable (13 Apr 2021 12:08)  Protonix 40 mg oral delayed release tablet: 1 tab(s) orally once a day (13 Apr 2021 12:08)  Topamax 25 mg oral tablet: 3 tab(s) orally 2 times a day (13 Apr 2021 12:08)  Vitamin D3 5000 intl units (125 mcg) oral capsule: 1 tab(s) orally once a day (13 Apr 2021 12:08)      13.3   7.72  )-----------( 189      ( 13 Apr 2021 12:28 )             38.7     04-13    134<L>  |  96<L>  |  13.0  ----------------------------<  87  5.1   |  26.0  |  0.91    Ca    9.1      13 Apr 2021 12:28      PT/INR - ( 13 Apr 2021 12:28 )   PT: 12.4 sec;   INR: 1.07 ratio    PTT - ( 13 Apr 2021 12:28 )  PTT:28.9 sec      Assessment of LVEF (Must be within 6 months):       EF: 64%       Assessed by: TTE       Date: 3/10/21  Echo (Date, Findings): 3/19/21: Normal LVSF; left ventricular filling patten is abnormal from impaired relaxation; Grade 1 diastolic dysfunction; mild valvular abnormality; EF 64%    Noninvasive Testing:   Stress Test: Date: 3/19/21       Protocol: Frankie       Duration of Exercise: N/A       Symptoms: NONE       EKG Changes: None       DTS: N/A       Myocardial Imaging: small zone of inferobasal ischemia       Risk Assessment (Low, Medium, High): medium      General: No fatigue, no fevers/chills  Respiratory: No dyspnea, no cough, no wheeze  CV: No chest pain, no palpitations  Abd: No nausea  Neuro: No headache, no dizziness  No Known Allergies      Objective:  Vital Signs Last 24 Hrs  T(C): 36.7 (13 Apr 2021 14:30), Max: 36.8 (13 Apr 2021 12:00)  T(F): 98 (13 Apr 2021 14:30), Max: 98.2 (13 Apr 2021 12:00)  HR: 62 (13 Apr 2021 14:30) (62 - 62)  BP: 135/65 (13 Apr 2021 14:30) (135/65 - 156/77)  RR: 18 (13 Apr 2021 14:30) (18 - 18)  SpO2: 99% (13 Apr 2021 14:30) (98% - 99%)    CM: SR  Neuro: A&OX3, CN 2-12 intact  HEENT: NC, AT  Lungs: CTA B/L  CV: S1, S2, no murmur, RRR  Abd: Soft  Right Groin: Soft, no bleeding, no hematoma; hemostasis achieved with Angioseal   Extremity: + distal pulses  EKG:   NSR     DIAGNOSTICS:     < from: Cardiac Cath Lab - Adult (04.13.21 @ 13:49) >  PROCEDURE:  --  Left heart catheterization with ventriculography.  --  Left coronary angiography.  --  Right coronary angiography.  --  Hemostasis with Angioseal.  --  Hemostasis with Angioseal.  TECHNIQUE: The risks and alternatives of the procedures and conscious  sedation were explained to the patient and informed consent was obtained.  Cardiac catheterization performed electively.  Local anesthetic given. Right femoral artery access. Left heart  catheterization. Ventriculography was performed. Left coronary artery  angiography. The vessel was injected utilizing a catheter. Right coronary  artery angiography. The vessel was injected utilizing a catheter.  Hemostasis with Angioseal. Hemostasis with Angioseal. RADIATION EXPOSURE:  2.7 min.  CONTRAST GIVEN: Omnipaque 134 ml. Omnipaque 65 ml.  MEDICATIONS GIVEN: 1% Lidocaine, 10 ml, subcutaneously.  VENTRICLES: There were no left ventricular global or regional wall motion  abnormalities.Global left ventricular function was normal. EF estimated  was 65 %.  VALVES: MITRAL VALVE: The mitral valve exhibited trivial regurgitation  (less than 1+).  CORONARY VESSELS: The coronary circulation is right dominant.  LM:   --  LM: Normal.  LAD: --  Mid LAD: There was a diffuse 30 % stenosis. Mild myocardial  bridging is noted.  CX:   --  Circumflex: Normal.  RCA:   --  RCA: Normal.  COMPLICATIONS: No complications occurred during the cath lab visit.  DIAGNOSTIC RECOMMENDATIONS: The patientshould continue with the present  medications.  Prepared and signed by  Michael Juarez MD    < end of copied text >    ASSESSMENT AND PLAN:    67 y/o male with h/o schizophrenia who was referred for a cardiac evaluation secondary to an abnormal EKG with nonspecific T wave changes.  Occasionally, he has had intermittent episodes of chest tightness that lasts for seconds when sleeping.  He has had intermittent palpitations.  He reports that he had the COVID vaccine at [facility] recently.  He is a poor historian.  He denies dyspnea, PND, or syncope.  NST revealed small zone of inferobasal ischemia.  He presented today for [facility] for further evaluation which showed normal coronaires.                      -post cardiac cath orders  -right groin precautions  -R groin site  dressing CDI no bleeding no hematoma noted, site soft non tender, positive pedal pulses bilat  -continue current medical therapy  -follow up outpatient with Dr. Fernández   -patient not a candidate for cardiac rehab secondary to: normal coronaries

## 2021-04-13 NOTE — DISCHARGE NOTE PROVIDER - HOSPITAL COURSE
65 y/o male with h/o schizophrenia who was referred for a cardiac evaluation secondary to an abnormal EKG with nonspecific T wave changes.  Occasionally, he has had intermittent episodes of chest tightness that lasts for seconds when sleeping.  He has had intermittent palpitations.  He reports that he had the COVID vaccine at [facility] recently.  He is a poor historian.  He denies dyspnea, PND, or syncope.  NST revealed small zone of inferobasal ischemia.  He presented today for [facility] for further evaluation which showed normal coronaries. Continue current medical management recommended. Follow with Dr. Quick in 2-3 weeks.

## 2021-04-13 NOTE — DISCHARGE NOTE PROVIDER - NSDCMRMEDTOKEN_GEN_ALL_CORE_FT
amLODIPine 10 mg oral tablet: 1 tab(s) orally once a day  Aspir 81 oral delayed release tablet: 1 tab(s) orally once a day  cetirizine 10 mg oral tablet: 1 tab(s) orally once a day  clonazePAM 0.5 mg oral tablet: 1 tab(s) orally 2 times a day  clonazePAM 2 mg oral tablet: 1 tab(s) orally once a day (at bedtime) MDD:1 tab a day  Depakote 500 mg oral delayed release tablet: 2 tab(s) orally 2 times a day  Haldol: 100 milligram(s) injectable  Protonix 40 mg oral delayed release tablet: 1 tab(s) orally once a day  Topamax 25 mg oral tablet: 3 tab(s) orally 2 times a day  Vitamin D3 5000 intl units (125 mcg) oral capsule: 1 tab(s) orally once a day

## 2021-04-13 NOTE — DISCHARGE NOTE PROVIDER - NSDCQMSTROKE_NEU_ALL_CORE
Reports vaginal pain, redness and swelling; denies injury, denies discharge or other gu sx. Also reports intermittent lower back pain.  
No

## 2021-04-13 NOTE — DISCHARGE NOTE PROVIDER - NSDCFUADDINST_GEN_ALL_CORE_FT
No heavy lifting, driving, sex, tub baths, swimming, or any activity that submerges the lower half of the body in water for 48 hours.  Limited walking and stairs for 48 hours.    Change the bandaid after 24 hours and every 24 hours after that.  Keep the puncture site dry and covered with a bandaid until a scab forms.    Observe the site frequently.  If bleeding or a large lump (the size of a golf ball or bigger) occurs lie flat, apply continuous direct pressure just above the puncture site for at least 10 minutes, and notify your physician immediately.  If the bleeding cannot be controlled, call 911 immediately for assistance.  Notify your physician of pain, swelling or any drainage.    Notify your physician immediately if coldness, numbness, discoloration or pain in your foot occurs.    patient not a candidate for cardiac rehab secondary to: normal coronaries

## 2021-04-13 NOTE — DISCHARGE NOTE PROVIDER - NSDCCPCAREPLAN_GEN_ALL_CORE_FT
PRINCIPAL DISCHARGE DIAGNOSIS  Diagnosis: Abnormal stress test  Assessment and Plan of Treatment: Patient presented to Dr. Quick office with c/o chest pressure. He therefore underwent and inschemic evaluaiton which showed small area of inferobasal ischemia.  Echocardiogram 3/19/21: Normal LVSF; left ventricular filling patten is abnormal from impaired relaxation; Grade 1 diastolic dysfunction; mild valvular abnormality; EF 64%. He therefore had a left heart catheterization for evaluation of CAD.

## 2021-04-13 NOTE — DISCHARGE NOTE PROVIDER - CARE PROVIDER_API CALL
Emy Fernández)  Cardiovascular Disease; Internal Medicine; Nuclear Cardiology  [address] [place] [address]  Phone: [phone]  Fax: [phone]  Follow Up Time:

## 2021-04-13 NOTE — DISCHARGE NOTE NURSING/CASE MANAGEMENT/SOCIAL WORK - PATIENT PORTAL LINK FT
You can access the FollowMyHealth Patient Portal offered by [facility] by registering at the following website: [email or web address]. By joining [employer]’s FollowMyHealth portal, you will also be able to view your health information using other applications (apps) compatible with our system.

## 2021-05-05 ENCOUNTER — APPOINTMENT (OUTPATIENT)
Dept: GASTROENTEROLOGY | Facility: CLINIC | Age: 66
End: 2021-05-05
Payer: MEDICARE

## 2021-05-05 VITALS
HEIGHT: 64 IN | HEART RATE: 75 BPM | TEMPERATURE: 97.8 F | DIASTOLIC BLOOD PRESSURE: 78 MMHG | RESPIRATION RATE: 14 BRPM | BODY MASS INDEX: 31.58 KG/M2 | WEIGHT: 185 LBS | SYSTOLIC BLOOD PRESSURE: 128 MMHG | OXYGEN SATURATION: 98 %

## 2021-05-05 DIAGNOSIS — Z86.39 PERSONAL HISTORY OF OTHER ENDOCRINE, NUTRITIONAL AND METABOLIC DISEASE: ICD-10-CM

## 2021-05-05 DIAGNOSIS — Z86.59 PERSONAL HISTORY OF OTHER MENTAL AND BEHAVIORAL DISORDERS: ICD-10-CM

## 2021-05-05 DIAGNOSIS — R63.1 POLYDIPSIA: ICD-10-CM

## 2021-05-05 DIAGNOSIS — F54 POLYDIPSIA: ICD-10-CM

## 2021-05-05 DIAGNOSIS — E87.1 HYPO-OSMOLALITY AND HYPONATREMIA: ICD-10-CM

## 2021-05-05 DIAGNOSIS — Z78.9 OTHER SPECIFIED HEALTH STATUS: ICD-10-CM

## 2021-05-05 DIAGNOSIS — Z86.79 PERSONAL HISTORY OF OTHER DISEASES OF THE CIRCULATORY SYSTEM: ICD-10-CM

## 2021-05-05 PROCEDURE — 99204 OFFICE O/P NEW MOD 45 MIN: CPT

## 2021-05-05 NOTE — ASSESSMENT
[FreeTextEntry1] : In view of his age she is certainly a candidate for colonoscopy which will be scheduled.  He will obtain a CBC, basic metabolic profile and prothrombin time prior.  He will also require cardiac clearance.  I have requested that we obtain a list of his medications for I review prior to the colonoscopy.  The blood on the tissue was probably due to underlying hemorrhoids. The risks, benefits, complications and possible adverse consequences associated with colonoscopy were discussed with the patient.\par

## 2021-05-05 NOTE — HISTORY OF PRESENT ILLNESS
[de-identified] : The patient is apparently here in order to schedule an initial screening colonoscopy.  He has never undergone a colonoscopy.  He is either bipolar or schizophrenic or both and lives in a group home.  However he was quite appropriate and conversant during the visit although he only speaks Ecuadorean and an  was utilized.  He is apparently on medication but he does not know the names of any of his medications and no list of  his meds was available.  He denies any abdominal pain, nausea or vomiting.  There is no family history of colon cancer.  He has occasional constipation and if he strains he might see some blood on the tissue.  He also has rare bouts of heartburn but no dysphagia.  His appetite and weight are stable.  He recently underwent an evaluation for chest pain during which she was noted to have a small area of ischemia but his coronaries were normal on catheterization.

## 2021-05-05 NOTE — PHYSICAL EXAM
[General Appearance - Alert] : alert [General Appearance - In No Acute Distress] : in no acute distress [General Appearance - Well Nourished] : well nourished [General Appearance - Well Developed] : well developed [General Appearance - Well-Appearing] : healthy appearing [Sclera] : the sclera and conjunctiva were normal [PERRL With Normal Accommodation] : pupils were equal in size, round, and reactive to light [Extraocular Movements] : extraocular movements were intact [Outer Ear] : the ears and nose were normal in appearance [Hearing Threshold Finger Rub Not [place]] : hearing was normal [Examination Of The Oral Cavity] : the lips and gums were normal [Neck Appearance] : the appearance of the neck was normal [Auscultation Breath Sounds / Voice Sounds] : lungs were clear to auscultation bilaterally [Heart Rate And Rhythm] : heart rate was normal and rhythm regular [Heart Sounds] : normal S1 and S2 [Heart Sounds Gallop] : no gallops [Murmurs] : no murmurs [Heart Sounds Pericardial Friction Rub] : no pericardial rub [Bowel Sounds] : normal bowel sounds [Abdomen Soft] : soft [Abdomen Tenderness] : non-tender [Abdomen Mass (___ Cm)] : no abdominal mass palpated [No CVA Tenderness] : no ~M costovertebral angle tenderness [No Spinal Tenderness] : no spinal tenderness [Abnormal Walk] : normal gait [Nail Clubbing] : no clubbing  or cyanosis of the fingernails [Musculoskeletal - Swelling] : no joint swelling seen [Motor Tone] : muscle strength and tone were normal [Skin Color & Pigmentation] : normal skin color and pigmentation [Skin Turgor] : normal skin turgor [] : no rash [Motor Exam] : the motor exam was normal [No Focal Deficits] : no focal deficits [Oriented To Time, Place, And Person] : oriented to person, place, and time [Impaired Insight] : insight and judgment were intact [Affect] : the affect was normal [FreeTextEntry1] : overweight

## 2021-06-12 ENCOUNTER — APPOINTMENT (OUTPATIENT)
Dept: DISASTER EMERGENCY | Facility: CLINIC | Age: 66
End: 2021-06-12

## 2021-06-15 ENCOUNTER — APPOINTMENT (OUTPATIENT)
Dept: GASTROENTEROLOGY | Facility: GI CENTER | Age: 66
End: 2021-06-15

## 2021-09-29 NOTE — DISCHARGE NOTE PROVIDER - NSDCQMERRANDS_GEN_ALL_CORE
Vitamin-D level shows good replacement therapy levels in a healthy range continue vitamin-D supplementation  No

## 2021-10-05 NOTE — PATIENT PROFILE ADULT. - FUNCTIONAL SCREEN CURRENT LEVEL: TRANSFERRING, MLM
Problem: Adult Inpatient Plan of Care  Goal: Plan of Care Review  Outcome: Ongoing (interventions implemented as appropriate)  POC reviewed with pt. VSS. Up in chair. Continuous ancef at 20.8ml/hr. No acute changes. Safety maintained. Will continue to monitor.        (0) independent never used

## 2022-05-06 NOTE — H&P ADULT - NSHPPOAPRESSUREULCER_GEN_ALL_CORE
Ruben Breen is a 78 y.o. male who is seen for Rectal Bleeding.      HPI:  The patient had a colonoscopy done on 03/04/2022 by Dr. Santiago which showed diverticulosis and hemorrhoids. He has a history of polyps, and is having trouble with constipation and hemorrhoids. He reports he started having problems cleaning himself after a bowel movement, and it seems like it is progressively getting worse. The patient reports that occasionally he suffers with external hemorrhoids. He states his bowel movements are not as regular and solid as they use to be. According to the McKenzie stool chart, he notes his bowel movements are between type 3 and 4, occasionally type 6.     The patient started taking MiraLax 6 years ago, [facility], and is currently taking it on a daily basis. He does not take a fiber supplement like Metamucil, although it has been suggested to him; he has concerns about how it will react with other medications he is taking. He recalls taking Metamucil years ago, noting that it helped keep him regular. He notes being a very conscientious carol ann, hygiene wise, and the frequent bowel movements are starting to cause disruption to his social life.    The patient mentions b frequent runner in the past completing a couple marathons a year, but since having surgery on his knees, and the six back surgeries, he can no longer run and is somewhat limited on physical activities. He also went through radiation therapy for prostate cancer. He currently receives a hormone shot every 6 months. His PSA is down to 1.0 ng/mL. He still does exercise and he still walks a couple miles everyday.    He has a history of diabetes.    Past Medical History:   Diagnosis Date   • Anemia    • Cataract    • Chronic low back pain    • Colon polyps     FOLLOWED BY DR. TIFFANIE SANTIAGO   • Constipation    • Diabetes mellitus (HCC)     TYPE 2, NIDDM, NO MEDS, DIET CONTROLLED   • ED (erectile dysfunction)    •  Enlarged prostate    • Exudative retinopathy, right eye 03/2021   • Glaucoma    • Gout 06/2020   • Hematuria    • Hemorrhoids    • History of alcohol dependence (HCC)     SOBER SINCE 1970   • History of radiation therapy 2020    FOR PROSTATE CANCER   • Hyperlipidemia    • Hypertension    • Kidney carcinoma, left (HCC)    • Kidney stones    • Leukoplakia of skin 05/2009   • Lumbar spinal stenosis    • Melanoma (HCC)     LEFT CHEEK   • Neuropathy    • Nevus, choroidal, left 01/2020   • Prostate cancer (HCC) 09/2020    VALERI 9, S/P XRT, FOLLOWED BY DR. OTILIA AVILES   • Pseudoarthrosis of lumbar spine    • Rectal bleeding 02/2022   • RLS (restless legs syndrome)    • Sensorineural hearing loss (SNHL) of both ears     WEARS HEARING AIDS   • Snoring    • Thoracic spinal stenosis    • Venous insufficiency        Past Surgical History:   Procedure Laterality Date   • BONE RESECTION, RIB Left 05/03/2004    [facility]   • CARPAL TUNNEL RELEASE Right 04/24/2005    [facility]   • CATARACT EXTRACTION EXTRACAPSULAR W/ INTRAOCULAR LENS IMPLANTATION Right 10/04/2018    [facility]   • COLONOSCOPY N/A 06/03/2019    2 TUBULAR ADENOMA POLYPS IN ASCENDING, SEVERE DIVERTICULOSIS, RESCOPE IN 5 YRS, DR[facility]   • COLONOSCOPY N/A 03/04/2022    HEMORRHOIDS, DIVERTICULOSIS IN SIGMOID,EROSIONS ON INTERNAL HEMORRHOIDS, DR. TIFFANIE LIND AT [facility]   • COLONOSCOPY N/A 11/30/2018    MULTIPLE DIVERTICULA IN LEFT COLON,7 MM POLYP IN RECTUM, [facility]   • COLONOSCOPY N/A 08/2005    TUBULOVILLOUS ADENOMA POLYP, RESCOPE IN 3 YRS   • COLONOSCOPY N/A 03/2005    BENIGN POLYP, RESCOPE IN 5 YRS   • COLONOSCOPY N/A 03/2013    TUBULAR ADENOMA, RESCOPE IN 3YRS   • COLONOSCOPY N/A 12/2018    BENIGN POLYP, POOR PREP RESCOPE IN 1YR   • EXCISION LESION N/A 01/03/2020    LESION X3, DR[facility]   • EXCISION LESION N/A 09/25/2019    DESTRUCTION OF LESION, DR[facility]ENNEDY [facility]   • EXTRACORPOREAL SHOCK WAVE LITHOTRIPSY (ESWL)  Right 05/19/2016    [facility]   • HEMORRHOID BANDING N/A 02/16/2017    DR. GROVE [facility]   • HEMORRHOID BANDING N/A 2020    [facility]   • LUMBAR DECOMPRESSION N/A 06/22/2015    POSTERIOR LUMBAR DECOMPRESSION AND FUSION L2-3 WITH LEGACY INSTRUMENTATION AND CELLSAVER, DR. LIZA KIM AT [place]   • LUMBAR FUSION N/A 04/11/2017    REMOVAL OF INSTRUMENTATION L2-4, POSTERIOR FUSION L2-4, DR. LIZA KIM AT [place]   • LUMBAR LAMINECTOMY DISCECTOMY DECOMPRESSION N/A 06/27/2014    POSTERIOR DECOMPRESSION AND FUSION L3-S1, LAMINECTOMY L3-S1, RIGHT L4-5 DISCECTOMY WITH LEGACY INSTRUMENTATION AND CELLS SAVER, DR. LIZA KIM AT [place]   • NEPHRECTOMY Left 08/25/1999    PERCUTANEOUS NEPHRECTOMY, [facility]   • NEPHRECTOMY RADICAL Left 05/03/2004    D/T KIDNEY CANCER, WITH REMOVAL OF RIBS, AT [facility]   • POSTERIOR LUMBAR/THORACIC SPINE FUSION N/A 08/17/2021    HARDWARE REMOVAL L1-S1, POSTERIOR FUSION L3-4 WITH AUTOGRAFT, DR. LIZA KIM AT [place]   • POSTERIOR LUMBAR/THORACIC SPINE FUSION N/A 08/23/2016    POSTERIOR LUMBAR FUSION T10-L3, DECOMPRESSION L1-2, HARDWARE REMOVAL L2, DR. LIZA KIM AT [place]   • PROSTATE ULTRASOUND BIOPSY Bilateral 05/07/2020    VALERI 9 ADENOCARCINOMA BILATERAL, [facility]   • SKIN CANCER DESTRUCTION Left 07/25/2019    MELANOMA LEFT CHEEK, DR[facility] KARTHIK LI [facility]   • SKIN CANCER EXCISION Left 02/17/2012    LEFT EAR WITH LOBE REMOVAL, [facility]   • SKIN CANCER EXCISION Left 06/25/2019    MELANOMA LEFT CHEEK   • THORACIC DECOMPRESSION POSTERIOR FUSION N/A 07/31/2017    EXTENSION OF POSTERIOR FUSION T6-10, DECOMPRESSION T9-10, DR. EMMANUEL BARRIENTOS AT [place]   • TOTAL KNEE ARTHROPLASTY Right 05/17/2011   • TOTAL KNEE ARTHROPLASTY Left 03/05/2018    DR. ROBERTO PIÑA AT [place]   • TOTAL KNEE ARTHROPLASTY REVISION Right 05/02/2018    DR. ROBERTO PIÑA AT [place]   • TOTAL KNEE ARTHROPLASTY REVISION Left    • TRIGGER FINGER RELEASE Right 02/09/2005    [facility]       Social History:   reports that he has  never smoked. He has never used smokeless tobacco. He reports that he does not drink alcohol and does not use drugs.      Marriage status:     Family History   Problem Relation Age of Onset   • Alcohol abuse Mother    • Alcohol abuse Father    • Hypertension Maternal Grandfather    • Arthritis Paternal Grandmother    • Hypertension Paternal Grandfather    • Malig Hyperthermia Neg Hx        Current Outpatient Medications:   •  ammonium lactate (AMLACTIN) 12 % cream, Apply  topically to the appropriate area as directed As Needed for Dry Skin., Disp: , Rfl:   •  amoxicillin (AMOXIL) 500 MG capsule, Take 1,000 mg by mouth Daily As Needed (used prior to dental visits)., Disp: , Rfl:   •  atorvastatin (LIPITOR) 20 MG tablet, Take 20 mg by mouth Daily., Disp: , Rfl:   •  clindamycin (CLINDAGEL) 1 % gel, Apply  topically to the appropriate area as directed 2 (Two) Times a Day., Disp: , Rfl:   •  colchicine 0.6 MG tablet, Take 0.6 mg by mouth Daily., Disp: , Rfl:   •  diclofenac sodium (VOTAREN XR) 100 MG 24 hr tablet, Take 100 mg by mouth Daily., Disp: , Rfl:   •  docusate sodium (COLACE) 250 MG capsule, Take 250 mg by mouth Daily., Disp: , Rfl:   •  finasteride (PROSCAR) 5 MG tablet, Take 5 mg by mouth Daily., Disp: , Rfl:   •  gabapentin (NEURONTIN) 400 MG capsule, Take 400 mg by mouth Daily As Needed., Disp: , Rfl:   •  glipizide (GLUCOTROL) 10 MG tablet, Take 10 mg by mouth 2 (Two) Times a Day Before Meals., Disp: , Rfl:   •  hydrocortisone 1 % cream, Apply  topically to the appropriate area as directed 2 (Two) Times a Day., Disp: , Rfl:   •  lisinopril (PRINIVIL,ZESTRIL) 2.5 MG tablet, Take 2.5 mg by mouth Daily., Disp: , Rfl:   •  polyethylene glycol (MIRALAX) 17 g packet, Take 17 g by mouth Daily., Disp: , Rfl:   •  potassium citrate (UROCIT-K) 10 MEQ (1080 MG) CR tablet, Take 10 mEq by mouth Daily As Needed., Disp: , Rfl:   •  tamsulosin (FLOMAX) 0.4 MG capsule 24 hr capsule, Take 1 capsule by mouth Daily.,  Disp: , Rfl:   •  traMADol (ULTRAM) 50 MG tablet, Take 50 mg by mouth Every 6 (Six) Hours As Needed for Moderate Pain ., Disp: , Rfl:   •  traZODone (DESYREL) 50 MG tablet, Take 50 mg by mouth Every Night., Disp: , Rfl:   •  urea (CARMOL) 20 % cream, Apply 1 application topically to the appropriate area as directed As Needed for Dry Skin., Disp: , Rfl:   •  albuterol sulfate  (90 Base) MCG/ACT inhaler, Inhale 2 puffs 4 (Four) Times a Day., Disp: , Rfl:   •  Aspirin 81 MG capsule, Take 1 capsule by mouth Daily., Disp: , Rfl:   •  bevacizumab (AVASTIN) 100 MG/4ML chemo injection, Inject 1 mg into the eye Every 3 (Three) Months., Disp: , Rfl:     Allergy  Patient has no known allergies.    Review of Systems   Constitutional: Positive for malaise/fatigue. Negative for decreased appetite and weight gain.   HENT: Positive for hearing loss. Negative for congestion and hoarse voice.    Eyes: Negative for blurred vision, discharge and visual disturbance.   Cardiovascular: Negative for chest pain, cyanosis and leg swelling.   Respiratory: Negative for cough, shortness of breath, sleep disturbances due to breathing and snoring.    Endocrine: Negative for cold intolerance and heat intolerance.   Hematologic/Lymphatic: Does not bruise/bleed easily.   Skin: Negative for itching, poor wound healing and skin cancer.   Musculoskeletal: Positive for back pain. Negative for arthritis, joint pain and joint swelling.   Gastrointestinal: Positive for hematochezia. Negative for abdominal pain, change in bowel habit, bowel incontinence and constipation.   Genitourinary: Negative for bladder incontinence, dysuria and hematuria.   Neurological: Positive for disturbances in coordination, numbness and weakness. Negative for brief paralysis, excessive daytime sleepiness, dizziness, focal weakness, headaches and light-headedness.   Psychiatric/Behavioral: Negative for altered mental status and hallucinations. The patient does not have  insomnia.    Allergic/Immunologic: Negative for HIV exposure and persistent infections.   All other systems reviewed and are negative.      Vitals:    05/06/22 0912   BP: 146/72   Pulse: 87   Temp: 97.4 °F (36.3 °C)   SpO2: 95%     Body mass index is 29.11 kg/m².    Physical Exam  Exam conducted with a chaperone present.   Constitutional:       General: He is not in acute distress.     Appearance: He is well-developed.   HENT:      Head: Normocephalic and atraumatic.      Nose: Nose normal.   Eyes:      Conjunctiva/sclera: Conjunctivae normal.      Pupils: Pupils are equal, round, and reactive to light.   Neck:      Trachea: No tracheal deviation.   Pulmonary:      Effort: Pulmonary effort is normal. No respiratory distress.      Breath sounds: Normal breath sounds.   Abdominal:      General: Bowel sounds are normal. There is no distension.      Palpations: Abdomen is soft.   Genitourinary:     Comments: Perianal exam: external hem -enlarged  JOYCELYN-good tone, no masses  Anoscopy performed:  Grade 3 x 3 internal hem    Musculoskeletal:         General: No deformity. Normal range of motion.      Cervical back: Normal range of motion.   Skin:     General: Skin is warm and dry.   Neurological:      Mental Status: He is alert and oriented to person, place, and time.      Cranial Nerves: No cranial nerve deficit.      Coordination: Coordination normal.      Gait: Gait normal.   Psychiatric:         Behavior: Behavior normal.         Judgment: Judgment normal.         Review of Medical Record:  I reviewed colonoscopy report and pictures    Assessment:  1. Internal hemorrhoids with complication    2. External hemorrhoid        Plan:  I discussed with the patient that upon examination, he has one or two enlarged external hemorrhoids, and when looking with the anoscope, I see three bigger internal hemorrhoids. I feel as far as the tissue is concerned, the rubber band procedure, nor the laser, are going to be sufficient to get  the result that we want. We discussed the options of the traditional hemorrhoidectomy, where you take the tissue off and the blood vessel behind it and then suture it back,  or we treat them with a hydrocortisone creams to see if they can be reduced. I would recommend we do the hemorrhoidectomy. I think that would make it easier for him and then once we get that extra tissue out of the way, he really focus on the fiber and I think he will have a good result. Both procedures were discussed with him at length, as well as the risks and benefits of each procedure. The patient had the opportunity to ask questions and I answered all questions. The patient understands and wishes to proceed with the hemorrhoidectomy.  The patient will proceed with the hemorrhoidectomy. The prep, as well as the risks and benefits, and postop recovery for this procedure have been discussed.         Transcribed from ambient dictation for Yang Sullivan MD by Linnea Larson.  05/06/22   13:32 EDT    Patient verbalized consent to the visit recording.     This patient was evaluated by me, recommendations made, documentation reviewed, edited, and revised by me, Yang Sullivan MD    ]         no

## 2022-06-10 ENCOUNTER — EMERGENCY (EMERGENCY)
Facility: HOSPITAL | Age: 67
LOS: 1 days | Discharge: HOME | End: 2022-06-10
Attending: STUDENT IN AN ORGANIZED HEALTH CARE EDUCATION/TRAINING PROGRAM
Payer: MEDICARE

## 2022-06-10 VITALS — HEIGHT: 64 IN | WEIGHT: 179.9 LBS

## 2022-06-10 DIAGNOSIS — F25.0 SCHIZOAFFECTIVE DISORDER, BIPOLAR TYPE: ICD-10-CM

## 2022-06-10 LAB
ANION GAP SERPL CALC-SCNC: 12 MMOL/L — SIGNIFICANT CHANGE UP (ref 5–17)
APAP SERPL-MCNC: <3 UG/ML — LOW (ref 10–26)
BASOPHILS # BLD AUTO: 0.04 K/UL — SIGNIFICANT CHANGE UP (ref 0–0.2)
BASOPHILS NFR BLD AUTO: 0.4 % — SIGNIFICANT CHANGE UP (ref 0–2)
BUN SERPL-MCNC: 9 MG/DL — SIGNIFICANT CHANGE UP (ref 8–20)
CALCIUM SERPL-MCNC: 8.8 MG/DL — SIGNIFICANT CHANGE UP (ref 8.6–10.2)
CHLORIDE SERPL-SCNC: 102 MMOL/L — SIGNIFICANT CHANGE UP (ref 98–107)
CO2 SERPL-SCNC: 23 MMOL/L — SIGNIFICANT CHANGE UP (ref 22–29)
CREAT SERPL-MCNC: 1.07 MG/DL — SIGNIFICANT CHANGE UP (ref 0.5–1.3)
EGFR: 76 ML/MIN/1.73M2 — SIGNIFICANT CHANGE UP
EOSINOPHIL # BLD AUTO: 0.01 K/UL — SIGNIFICANT CHANGE UP (ref 0–0.5)
EOSINOPHIL NFR BLD AUTO: 0.1 % — SIGNIFICANT CHANGE UP (ref 0–6)
ETHANOL SERPL-MCNC: <10 MG/DL — SIGNIFICANT CHANGE UP (ref 0–9)
GLUCOSE SERPL-MCNC: 111 MG/DL — HIGH (ref 70–99)
HCT VFR BLD CALC: 34 % — LOW (ref 39–50)
HGB BLD-MCNC: 11.2 G/DL — LOW (ref 13–17)
IMM GRANULOCYTES NFR BLD AUTO: 0.2 % — SIGNIFICANT CHANGE UP (ref 0–1.5)
LYMPHOCYTES # BLD AUTO: 0.88 K/UL — LOW (ref 1–3.3)
LYMPHOCYTES # BLD AUTO: 9.4 % — LOW (ref 13–44)
MCHC RBC-ENTMCNC: 27.1 PG — SIGNIFICANT CHANGE UP (ref 27–34)
MCHC RBC-ENTMCNC: 32.9 GM/DL — SIGNIFICANT CHANGE UP (ref 32–36)
MCV RBC AUTO: 82.1 FL — SIGNIFICANT CHANGE UP (ref 80–100)
MONOCYTES # BLD AUTO: 0.5 K/UL — SIGNIFICANT CHANGE UP (ref 0–0.9)
MONOCYTES NFR BLD AUTO: 5.3 % — SIGNIFICANT CHANGE UP (ref 2–14)
NEUTROPHILS # BLD AUTO: 7.92 K/UL — HIGH (ref 1.8–7.4)
NEUTROPHILS NFR BLD AUTO: 84.6 % — HIGH (ref 43–77)
PLATELET # BLD AUTO: 302 K/UL — SIGNIFICANT CHANGE UP (ref 150–400)
POTASSIUM SERPL-MCNC: 3.8 MMOL/L — SIGNIFICANT CHANGE UP (ref 3.5–5.3)
POTASSIUM SERPL-SCNC: 3.8 MMOL/L — SIGNIFICANT CHANGE UP (ref 3.5–5.3)
RBC # BLD: 4.14 M/UL — LOW (ref 4.2–5.8)
RBC # FLD: 15.3 % — HIGH (ref 10.3–14.5)
SALICYLATES SERPL-MCNC: <0.6 MG/DL — LOW (ref 10–20)
SARS-COV-2 RNA SPEC QL NAA+PROBE: SIGNIFICANT CHANGE UP
SODIUM SERPL-SCNC: 136 MMOL/L — SIGNIFICANT CHANGE UP (ref 135–145)
WBC # BLD: 9.37 K/UL — SIGNIFICANT CHANGE UP (ref 3.8–10.5)
WBC # FLD AUTO: 9.37 K/UL — SIGNIFICANT CHANGE UP (ref 3.8–10.5)

## 2022-06-10 PROCEDURE — 93010 ELECTROCARDIOGRAM REPORT: CPT

## 2022-06-10 PROCEDURE — 90792 PSYCH DIAG EVAL W/MED SRVCS: CPT

## 2022-06-10 PROCEDURE — 99220: CPT

## 2022-06-10 RX ORDER — HALOPERIDOL DECANOATE 100 MG/ML
5 INJECTION INTRAMUSCULAR ONCE
Refills: 0 | Status: COMPLETED | OUTPATIENT
Start: 2022-06-10 | End: 2022-06-10

## 2022-06-10 RX ORDER — MIDAZOLAM HYDROCHLORIDE 1 MG/ML
5 INJECTION, SOLUTION INTRAMUSCULAR; INTRAVENOUS ONCE
Refills: 0 | Status: DISCONTINUED | OUTPATIENT
Start: 2022-06-10 | End: 2022-06-10

## 2022-06-10 RX ORDER — DIPHENHYDRAMINE HCL 50 MG
50 CAPSULE ORAL ONCE
Refills: 0 | Status: COMPLETED | OUTPATIENT
Start: 2022-06-10 | End: 2022-06-10

## 2022-06-10 RX ADMIN — HALOPERIDOL DECANOATE 5 MILLIGRAM(S): 100 INJECTION INTRAMUSCULAR at 16:17

## 2022-06-10 RX ADMIN — MIDAZOLAM HYDROCHLORIDE 5 MILLIGRAM(S): 1 INJECTION, SOLUTION INTRAMUSCULAR; INTRAVENOUS at 16:17

## 2022-06-10 RX ADMIN — Medication 50 MILLIGRAM(S): at 16:48

## 2022-06-10 NOTE — ED BEHAVIORAL HEALTH ASSESSMENT NOTE - DESCRIPTION
Pt recived Haldol 5, Benadryl 50 and versed 10 mg upon arrival to hospital for agitation  and rambling speech.  T(F): 97.8 (06-10-22 @ 22:37), Max: 98.6 (06-10-22 @ 16:07)  HR: 66 (06-10-22 @ 22:37) (66 - 110)  BP: 129/73 (06-10-22 @ 22:37) (129/73 - 139/83)  RR: 20 (06-10-22 @ 22:37) (20 - 24)  SpO2: 98% (06-10-22 @ 22:37) (98% - 100%) GERD, BPH, Patient was born in [place], Came to [place] 29 years ago

## 2022-06-10 NOTE — ED BEHAVIORAL HEALTH ASSESSMENT NOTE - CURRENT MEDICATION
Non compliant with all meds:  Most recent regimen:  Colace 50 mg hs, Lipitor 20 hs, Cogentin 1 mg bid, Seroquel 300 hs, Pepcid 40 bid, Lithium 300 bid, Ondansetron 4 mg tid, Risperdal 3 bid, Risperdal inj 2 ml q 14 days (due June 1, staff unsure if received.  Amlodipine 10 mg qd, Tamsulosin 0.4 mg daily at dinner time

## 2022-06-10 NOTE — ED PROVIDER NOTE - PROGRESS NOTE DETAILS
S/p 10 mg IM versed, 5 mg IV Haldol, and 50 mg IV benadryl. Now more calm. Pending labs,  to see patient. - Lashawn

## 2022-06-10 NOTE — ED ADULT TRIAGE NOTE - CHIEF COMPLAINT QUOTE
Pt arrives yelling about animals and refusing to answer RN questions. EMS reports pt from [facility] and they state he has not been taking his medications lately. MD called for STAT eval. LONNY bob#[id number] escorted pt to ER in handcuffs.

## 2022-06-10 NOTE — ED PROVIDER NOTE - OBJECTIVE STATEMENT
66 y/o 68 y/o M with PMHx schizophrenia who presents to the ED BIBEMS for agitation. Patient lives in a group home, was seen by his psychiatrist today Dr. Jeannette Wills who sent him in because he has been off his psych meds for 3 days and becoming agitated. Actively yelling and not making any sense in triage, s/p sedation.

## 2022-06-10 NOTE — ED BEHAVIORAL HEALTH ASSESSMENT NOTE - INVOLUNTARY INTRAMUSCULAR MEDICATION DETAILS
Pt recived Haldol 5, Benadryl 50 and versed 10 mg upon arrival to hospital for agitation  and rambling speech.

## 2022-06-10 NOTE — ED BEHAVIORAL HEALTH ASSESSMENT NOTE - NS ED BHA PLAN ADMIT TO PSYCHIATRY REFERRANT CONTACTED YN
CONSULTATION NOTE :ID       Patient - Indiana Saeed,  Age - 67 y.o.    - 1948      Room Number - 6E-66/066-A   N -  [id number]   [facility]  Date of Admission -  2020  9:17 AM  Patient's PCP: No primary care provider on file. Requesting Physician: Elijah Grady MD    REASON FOR CONSULTATION   Intraabdominal abscess  CHIEF COMPLAINT   Abdominal pain    HISTORY OF PRESENT ILLNESS       This is a very pleasant 67 y.o. male who was admitted to the hospital with a chief complaints of abdominal pain over the epigastric area. he has been having poor intake. He thought it was related to the [facility] he had on March . when he came to ED he had CT scan which showed a collection behind the liver. He had hx of appendectomy and cholecystectomy in . He stayed for one  Wk after the cholecystectomy. he denies any fever or chills, no vomiting no diarrhea. he has hx of HTN and stroke. PAST MEDICAL  HISTORY       Past Medical History:   Diagnosis Date    Cerebral artery occlusion with cerebral infarction ([facility] [address]     Hypertension        PAST SURGICAL HISTORY     Past Surgical History:   Procedure Laterality Date    APPENDECTOMY      CHOLECYSTECTOMY      COLONOSCOPY      FRACTURE SURGERY      HERNIA REPAIR      JOINT REPLACEMENT           MEDICATIONS:       Scheduled Meds:   sodium chloride flush  10 mL Intravenous 2 times per day    piperacillin-tazobactam  3.375 g Intravenous Q8H     Continuous Infusions:   sodium chloride 50 mL/hr at 20 1556     PRN Meds:sodium chloride flush, acetaminophen **OR** acetaminophen, polyethylene glycol, promethazine **OR** ondansetron, HYDROcodone-acetaminophen  Allergies:   ALLERGIES:    Patient has no known allergies. SOCIAL HISTORY:     TOBACCO:   reports that he has never smoked. He has never used smokeless tobacco.     ETOH:   has no history on file for alcohol.   Patient currently lives with family DK YELLOW*   MUCUS THREADS   PROTEINU TRACE*   BLOODU NEGATIVE   RBCUA 3-5   WBCUA 2-4   BACTERIA NONE SEEN   NITRU NEGATIVE   GLUCOSEU NEGATIVE   BILIRUBINUR NEGATIVE   UROBILINOGEN 0.2   KETUA TRACE*          Problem list of patient      Patient Active Problem List   Diagnosis Code    Intra-abdominal abscess ([facility] [address] K65.1           Impression and Recommendation:   Abscess behind the liver: will ask IR to evaluate for aspiration  Continue iv zosyn   Will need colonoscopy on elective bases    Thank you Tamy Hartmann MD for allowing me to participate in this patient's care.     Eb Santoro MD,FACP 11/30/2020 5:37 PM N/A

## 2022-06-10 NOTE — ED PROVIDER NOTE - ATTENDING CONTRIBUTION TO CARE
I, Mario Hart, performed a face to face bedside interview with this patient regarding history of present illness, review of symptoms and relevant past medical, social and family history.  I completed an independent physical examination. I have communicated the patient’s plan of care and disposition with the resident.  67 year old male with PMH schizophrenia presents with agitation. As per group home the pt has not been taking his meds, and then today threatened the psychiatrist. He arrived acute agitated, aggressive and threatnening staff requiring medication for his acute agitation  Gen: NAD, well appearing  CV: RRR  Pul: CTA b/l  Abd: Soft, non-distended, non-tender  Neuro: no focal deficits  Pt medically cleared and will require inpt psych

## 2022-06-10 NOTE — ED CDU PROVIDER INITIAL DAY NOTE - OBJECTIVE STATEMENT
66 y/o M with PMHx schizophrenia who presents to the ED BIBEMS for agitation. Patient lives in a group home, was seen by his psychiatrist today Dr. Jeannette Wills who sent him in because he has been off his psych meds for 3 days and becoming agitated. Actively yelling and not making any sense in triage, s/p sedation.

## 2022-06-10 NOTE — ED ADULT NURSE NOTE - CHIEF COMPLAINT QUOTE
Pt arrives yelling about animals and refusing to answer RN questions. EMS reports pt from [facility] and they state he has not been taking his medications lately. MD called for STAT eval. LONNY bob#[id number] escorted pt to ER in handcuffs.

## 2022-06-10 NOTE — ED BEHAVIORAL HEALTH ASSESSMENT NOTE - SUMMARY
Patient is a 67 year old, male; domiciled in group home x 2 yrs, [address] [facility],  noncaregiver; unemployed; PPH of [place], Schizoaffective disorder; with multiple prior psychiatric hospitalizations last approx 1 mo ago at [facility] On [place], followed by ACT team Jose at [employer] ,  ; no known suicide attempts; no active substance abuse or known history of complicated withdrawal; PMH of GERD ; brought in by EMS activated by psychiatrist from ACT team, Dr Saunders on todays home visit.  Pt requires psych admission due to mood instability irritability and decompensation following noncompliance of meds and threatening to harm psychiatrist today.  Pt will be involuntary, once bed becomes available and medically cleared

## 2022-06-10 NOTE — ED PROVIDER NOTE - PHYSICAL EXAMINATION
General: agitated  Head: NC, AT  EENT: no scleral icterus  Cardiac: no lower extremity edema  Respiratory: no respiratory distress   Abdomen: ND  MSK/Vascular: full ROM  Neuro: grossly intact  Psych: agitated

## 2022-06-10 NOTE — ED BEHAVIORAL HEALTH ASSESSMENT NOTE - PATIENT'S CHIEF COMPLAINT
"I don't want to take the medication because it is too much and makes me drop things...I want to have my meds changed and no one listens to me".

## 2022-06-10 NOTE — ED BEHAVIORAL HEALTH ASSESSMENT NOTE - HPI (INCLUDE ILLNESS QUALITY, SEVERITY, DURATION, TIMING, CONTEXT, MODIFYING FACTORS, ASSOCIATED SIGNS AND SYMPTOMS)
Patient is a 67 year old, male; domiciled in group home x 2 yrs, [address],  noncaregiver; unemployed; PPH of [place], Schizoaffective disorder; with multiple prior psychiatric hospitalizations last approx 1 mo ago at [facility], On AOT, followed by ACT team Jose at [facility] ,  ; no known suicide attempts; no active substance abuse or known history of complicated withdrawal; PMH of GERD ; brought in by EMS activated by psychiatrist from ACT team, Dr Saunders on todays home visit.   Pt interviewed with Tajik speaking VIRIDIANA De La [place].  Pt speaks loudly and is animated, reports he came here because he was not taking his meds and would not elaborate.  When reminded of AOT and court order to take meds, he c/o meds being too much and cause him to drop things and feels dizzy.  Pt claims he asked his MD to change meds but she refused to.  Pt denies depression, SI/HI.  He was informed he must go inpt to have meds and changed and he agreed.  Pt denies AH then said he hears his grandsons voices telling him to "NOT" go to [place].  Per collateral Pt has been increasingly agitated and cursing at house mates.  Pt has been refusing to take all his med both medical and psych meds.  He mumbles in Syriac and is verbally aggressive when at baseline is pleasant and asks for hugs.  Collateral, Natalie reports his ACT team doctor came to house today to speak with Pt about taking meds and Pt became agitated and threatened to hurt her.  Pt was sleeping in ED, was irritable but in control.  Will admit to psych in pt due to decompensation secondary to noncompliance  and aggression at home

## 2022-06-10 NOTE — ED BEHAVIORAL HEALTH ASSESSMENT NOTE - OTHER PAST PSYCHIATRIC HISTORY (INCLUDE DETAILS REGARDING ONSET, COURSE OF ILLNESS, INPATIENT/OUTPATIENT TREATMENT)
Patient reports that he has 20 year history of multiple psychiatric hospitalizations    Current on AOT and with ACT team   Jose at [employer]

## 2022-06-10 NOTE — ED ADULT NURSE NOTE - OBJECTIVE STATEMENT
Patient received from critical care/ambulance triage area where he arrived from [facility] due to being uncooperative, not taking his medications, brought in by EMS and [facility] for safety.  Patient was medically restrained for safety, sleeping at this time, unable to answer questions for complete assessment at this time.

## 2022-06-11 LAB
AMPHET UR-MCNC: NEGATIVE — SIGNIFICANT CHANGE UP
APPEARANCE UR: CLEAR — SIGNIFICANT CHANGE UP
BARBITURATES UR SCN-MCNC: NEGATIVE — SIGNIFICANT CHANGE UP
BENZODIAZ UR-MCNC: POSITIVE
BILIRUB UR-MCNC: NEGATIVE — SIGNIFICANT CHANGE UP
COCAINE METAB.OTHER UR-MCNC: NEGATIVE — SIGNIFICANT CHANGE UP
COLOR SPEC: YELLOW — SIGNIFICANT CHANGE UP
DIFF PNL FLD: NEGATIVE — SIGNIFICANT CHANGE UP
GLUCOSE UR QL: NEGATIVE MG/DL — SIGNIFICANT CHANGE UP
KETONES UR-MCNC: NEGATIVE — SIGNIFICANT CHANGE UP
LEUKOCYTE ESTERASE UR-ACNC: NEGATIVE — SIGNIFICANT CHANGE UP
METHADONE UR-MCNC: NEGATIVE — SIGNIFICANT CHANGE UP
NITRITE UR-MCNC: NEGATIVE — SIGNIFICANT CHANGE UP
OPIATES UR-MCNC: NEGATIVE — SIGNIFICANT CHANGE UP
PCP SPEC-MCNC: SIGNIFICANT CHANGE UP
PCP UR-MCNC: NEGATIVE — SIGNIFICANT CHANGE UP
PH UR: 7 — SIGNIFICANT CHANGE UP (ref 5–8)
PROT UR-MCNC: NEGATIVE — SIGNIFICANT CHANGE UP
SP GR SPEC: 1 — LOW (ref 1.01–1.02)
THC UR QL: NEGATIVE — SIGNIFICANT CHANGE UP
UROBILINOGEN FLD QL: NEGATIVE MG/DL — SIGNIFICANT CHANGE UP

## 2022-06-11 PROCEDURE — 99226: CPT

## 2022-06-11 RX ORDER — RISPERIDONE 4 MG/1
2 TABLET ORAL AT BEDTIME
Refills: 0 | Status: DISCONTINUED | OUTPATIENT
Start: 2022-06-11 | End: 2022-06-15

## 2022-06-11 RX ORDER — ATORVASTATIN CALCIUM 80 MG/1
20 TABLET, FILM COATED ORAL AT BEDTIME
Refills: 0 | Status: DISCONTINUED | OUTPATIENT
Start: 2022-06-11 | End: 2022-06-15

## 2022-06-11 RX ORDER — FAMOTIDINE 10 MG/ML
40 INJECTION INTRAVENOUS
Refills: 0 | Status: DISCONTINUED | OUTPATIENT
Start: 2022-06-11 | End: 2022-06-15

## 2022-06-11 RX ORDER — TAMSULOSIN HYDROCHLORIDE 0.4 MG/1
0.4 CAPSULE ORAL
Refills: 0 | Status: DISCONTINUED | OUTPATIENT
Start: 2022-06-11 | End: 2022-06-15

## 2022-06-11 RX ORDER — AMLODIPINE BESYLATE 2.5 MG/1
10 TABLET ORAL DAILY
Refills: 0 | Status: DISCONTINUED | OUTPATIENT
Start: 2022-06-11 | End: 2022-06-15

## 2022-06-11 RX ADMIN — ATORVASTATIN CALCIUM 20 MILLIGRAM(S): 80 TABLET, FILM COATED ORAL at 21:50

## 2022-06-11 RX ADMIN — Medication 2 MILLIGRAM(S): at 07:49

## 2022-06-11 RX ADMIN — FAMOTIDINE 40 MILLIGRAM(S): 10 INJECTION INTRAVENOUS at 18:06

## 2022-06-11 RX ADMIN — AMLODIPINE BESYLATE 10 MILLIGRAM(S): 2.5 TABLET ORAL at 21:51

## 2022-06-11 RX ADMIN — TAMSULOSIN HYDROCHLORIDE 0.4 MILLIGRAM(S): 0.4 CAPSULE ORAL at 18:06

## 2022-06-11 RX ADMIN — RISPERIDONE 2 MILLIGRAM(S): 4 TABLET ORAL at 21:51

## 2022-06-11 NOTE — CHART NOTE - NSCHARTNOTEFT_GEN_A_CORE
SW placed call to [facility] and spoke with employee, Jessica. SW requested [facility] review patient's referral for inpatient psych. Jessica followed up with SW to make SW aware patient would be appropriate for geriatric psychiatry bed. SW placed call to Assistant Director of Nursing, Yenifer, from [facility], whom confirmed they do not have a geriatric psych bed at this time. SW will continue to follow for safe discharge planning.

## 2022-06-12 LAB — SARS-COV-2 RNA SPEC QL NAA+PROBE: SIGNIFICANT CHANGE UP

## 2022-06-12 PROCEDURE — 99226: CPT

## 2022-06-12 RX ADMIN — FAMOTIDINE 40 MILLIGRAM(S): 10 INJECTION INTRAVENOUS at 06:36

## 2022-06-12 RX ADMIN — RISPERIDONE 2 MILLIGRAM(S): 4 TABLET ORAL at 22:40

## 2022-06-12 RX ADMIN — TAMSULOSIN HYDROCHLORIDE 0.4 MILLIGRAM(S): 0.4 CAPSULE ORAL at 16:51

## 2022-06-12 RX ADMIN — FAMOTIDINE 40 MILLIGRAM(S): 10 INJECTION INTRAVENOUS at 16:52

## 2022-06-12 RX ADMIN — ATORVASTATIN CALCIUM 20 MILLIGRAM(S): 80 TABLET, FILM COATED ORAL at 22:40

## 2022-06-12 RX ADMIN — AMLODIPINE BESYLATE 10 MILLIGRAM(S): 2.5 TABLET ORAL at 06:35

## 2022-06-12 NOTE — ED ADULT NURSE REASSESSMENT NOTE - NS ED NURSE REASSESS COMMENT FT1
Assumed care of patient.  Pt alert and cooperative.  Pt lying in bed in darken room.  NAD noted will monitor and chart changes and maintain safe environment
Assumed care of pt at 1930 from [facility].  Charting as noted. pt remains on 1:1 observation and is awaiting a evaluation with .  pt resting comfortably in bed, no acute distress noted at this time safety maintained. Pt encouraged to report any needs or changes to staff.
Patient received all his PM medications, tolerated well, patient was compliant with medication administration. No complains of visual or auditory hallucination, no attempt to harm self or other, all needs attend by nursing staff, safety maintained.
Patient was able to sleep all night. No complain of visual or auditory hallucination, no suicidal or homicidal thoughts. Patient remained estelle and cooperative, all needs attend, safety maintained.
Pt placed into gown, placed on cardiac monitor, wallet, glasses, and cell phone, placed into belongings bag for safety.
Patient ate 100% of his breakfast and then returned to bed.  No attempts to harm self or others and safety maintained.
Pt received @ 0730, alert and oriented to person and place only.  Pt is very excitable, speaking very loud to self and becomes verbally abusive at times towards staff.  Pt denies any pain, VSS.  Abd soft nondistended, nontender, moving all ext well.  1:1 maintained for pt safety.  Pt medicated for agitation and given breakfast as he requested.  Pt waiting for transfer.  Will continue to monitor.
Patient in bed calm and cooperative, no attempt to harm self and other, all needs attend by nursing staff, safety maintained.
Assumed are of patient at 0730.  Patient resting in bed with no distress appears to be sleeping with regular non labored breathing noted.  Safety of patient maintained.
Patient ate 100% of his dinner.  Patient complaint with medications as prescribed.  No attempts to harm self or others and is resting in bed.  Safety of patient maintained.
Patient ate 25% of his dinner.  Patient compliant with ordered testing.  Resting in bed with no distress and safety maintained.
Patient superficial on interactions and offering no complaints.  Patient withdrawn to his room frequently.  Patient ate 100% of his lunch.  Safety of patient maintained.
Presented in  area dressed in hospital gowns.  Patient interviewed with  Silva Avila.  Patient reports he stopped his medication at home because it made him to sleepy.  Patient denies suicidal or homicidal ideations.  No overt psychotic symptoms noted.  Patient cooperative with security contraband assessment.  Constant observation discontinued of  presentation.  Patient oriented to  area and staff.  Patient provided urine sample for testing.

## 2022-06-12 NOTE — CHART NOTE - NSCHARTNOTEFT_GEN_A_CORE
Patient's plan is to be transferred to inpatient psych. VIRIDIANA placed call to Kalin Assistant Director of Nursing and left callback information. Patient will require a geriatric inpatient psych bed. VIRIDIANA continues to follow.

## 2022-06-12 NOTE — CHART NOTE - NSCHARTNOTEFT_GEN_A_CORE
SW Note: SW made aware pt is in need of inpt psych trx on 9.27 basis, SW in process of facilitating completion of 2PC legals. Pt in need of sanjay-psych unit, no beds at [facility] or [place]. SW received call on unit from pt's residence [facility] ([facility], [phone]) requesting update when bed is secured for pt and pt is being trx. SW following SW Note: SW made aware pt is in need of inpt psych trx on 9.27 basis, SW in process of facilitating completion of 2PC legals. Pt in need of sanjay-psych unit, no beds at [facility] ([facility]) or [place] ([place]). SW received call on unit from pt's residence [facility] ([facility], [phone]) requesting update when bed is secured for pt and pt is being trx. SW following

## 2022-06-12 NOTE — ED ADULT NURSE REASSESSMENT NOTE - COMFORT CARE
Gabrielle (PPB)   Date Value   11/13/2018 6      
plan of care explained
darkened lights
meal provided/plan of care explained
plan of care explained
plan of care explained/wait time explained
meal provided/plan of care explained
meal provided/plan of care explained

## 2022-06-13 VITALS
SYSTOLIC BLOOD PRESSURE: 118 MMHG | RESPIRATION RATE: 18 BRPM | OXYGEN SATURATION: 97 % | DIASTOLIC BLOOD PRESSURE: 76 MMHG | TEMPERATURE: 97 F | HEART RATE: 62 BPM

## 2022-06-13 PROCEDURE — 99213 OFFICE O/P EST LOW 20 MIN: CPT

## 2022-06-13 PROCEDURE — 99285 EMERGENCY DEPT VISIT HI MDM: CPT | Mod: 25

## 2022-06-13 PROCEDURE — 80307 DRUG TEST PRSMV CHEM ANLYZR: CPT

## 2022-06-13 PROCEDURE — 85025 COMPLETE CBC W/AUTO DIFF WBC: CPT

## 2022-06-13 PROCEDURE — 96374 THER/PROPH/DIAG INJ IV PUSH: CPT

## 2022-06-13 PROCEDURE — G0378: CPT

## 2022-06-13 PROCEDURE — U0005: CPT

## 2022-06-13 PROCEDURE — 80048 BASIC METABOLIC PNL TOTAL CA: CPT

## 2022-06-13 PROCEDURE — 96372 THER/PROPH/DIAG INJ SC/IM: CPT | Mod: XU

## 2022-06-13 PROCEDURE — 99217: CPT

## 2022-06-13 PROCEDURE — 93005 ELECTROCARDIOGRAM TRACING: CPT

## 2022-06-13 PROCEDURE — U0003: CPT

## 2022-06-13 PROCEDURE — 36415 COLL VENOUS BLD VENIPUNCTURE: CPT

## 2022-06-13 PROCEDURE — 96375 TX/PRO/DX INJ NEW DRUG ADDON: CPT

## 2022-06-13 PROCEDURE — 81003 URINALYSIS AUTO W/O SCOPE: CPT

## 2022-06-13 RX ADMIN — FAMOTIDINE 40 MILLIGRAM(S): 10 INJECTION INTRAVENOUS at 06:10

## 2022-06-13 RX ADMIN — AMLODIPINE BESYLATE 10 MILLIGRAM(S): 2.5 TABLET ORAL at 06:11

## 2022-06-13 NOTE — ED CDU PROVIDER DISPOSITION NOTE - CLINICAL COURSE
66yo man PMH schizophrenia presented for aggressive behavior and required sedation upon arrival to the ED. Pt was evaluated by psychiatric team who recommended inpatient management.

## 2022-06-13 NOTE — ED CDU PROVIDER SUBSEQUENT DAY NOTE - PSYCHIATRIC, MLM
Alert and oriented to person, place, time/situation. normal mood and affect. no apparent risk to self or others.

## 2022-06-13 NOTE — ED CDU PROVIDER SUBSEQUENT DAY NOTE - NORMAL, MLM
jasmin all pertinent systems normal

## 2022-06-13 NOTE — CHART NOTE - NSCHARTNOTEFT_GEN_A_CORE
SW Note: pt will be transferred to [facility] for inpt psychiatric care. Accepting MD Dr. Mcgregor. Legal status:9.37. Notified by [facility] [phone]. [facility] ambulance arranged. [facility] transportation letter sent with pt. No auth needed, medicare ( exhausted BH days) secondary Medicaid

## 2022-06-13 NOTE — ED CDU PROVIDER SUBSEQUENT DAY NOTE - MEDICAL DECISION MAKING DETAILS
pt medically cleared and pending inpt psych

## 2022-06-13 NOTE — ED BEHAVIORAL HEALTH NOTE - BEHAVIORAL HEALTH NOTE
PROGRESS NOTE: 22 @ 09:58  	  • Reason for Ongoing Consultation: 	medication non compliance with behavioral decompensation     ID: 66 yo Male with HEALTH ISSUES - PROBLEM Dx:  Schizoaffective disorder, bipolar type      INTERVAL DATA:   • Interval Chief Complaint: "Medications are not helping"  • Interval History: 66 yo single, unemployed male domiciled in group home x 2 yrs ([address]) with past psych hx of Bipolar, Schizoaffective disorder with multiple prior psych hospitalizations last ~1 mo ago at [facility], followed by ACT team Jose at [facility] , no known suicide attempts; no active substance abuse or known history of complicated withdrawal; and PMHx of GERD BIB EMS activated by psychiatrist from ACT team, Dr Saunders on todays home visit.   Pt alert lying in bed with loud volume and speech, reports his medications are "not helping", though per chart pt has not been taking his medication for multiple days. Claims his medications are causing all kinds of side effects which is why he has been non-compliant. Pt currently endorsing abdominal pain, agreeable to Maalox. Pt then blessed this writer and the entire world once encounter complete. Denies SI/HI, A/V/H, agitation.    REVIEW OF SYSTEMS:   • Constitutional Symptoms	No complaints  • Eyes	No complaints  • Ears / Nose / Throat / Mouth	No complaints  • Cardiovascular	No complaints  • Respiratory	No complaints  • Gastrointestinal	Abdominal pain  • Genitourinary	No complaints  • Musculoskeletal	No complaints  • Skin	No complaints  • Neurological	No complaints  • Psychiatric (see HPI)	See HPI  • Endocrine	No complaints  • Hematologic / Lymphatic	No complaints  • Allergic / Immunologic	No complaints    REVIEW OF VITALS/LABS/IMAGING/INVESTIGATIONS:   • Vital signs reviewed: Yes  • Vital Signs:	    T(C): 36.5 (22 @ 07:06), Max: 36.9 (22 @ 19:30)  HR: 73 (22 @ 07:06) (66 - 88)  BP: 119/77 (22 @ 07:06) (110/65 - 144/78)  RR: 20 (22 @ 07:06) (17 - 20)  SpO2: 98% (22 @ 07:06) (96% - 98%)    • Available labs reviewed: Yes  • Available Lab Results:     Urinalysis Basic - ( 2022 16:13 )    Color: Yellow / Appearance: Clear / S.005 / pH: x  Gluc: x / Ketone: Negative  / Bili: Negative / Urobili: Negative mg/dL   Blood: x / Protein: Negative / Nitrite: Negative   Leuk Esterase: Negative / RBC: x / WBC x   Sq Epi: x / Non Sq Epi: x / Bacteria: x    MEDICATIONS:      PRN Medications: yes  • PRN Medications since last evaluation	  • PRN Details: Agitation kit, Haldol 5, Ativan 2, Benadryl 50	    Current Medications:   amLODIPine   Tablet 10 milliGRAM(s) Oral daily  atorvastatin 20 milliGRAM(s) Oral at bedtime  famotidine    Tablet 40 milliGRAM(s) Oral two times a day  risperiDONE   Tablet 2 milliGRAM(s) Oral at bedtime  tamsulosin 0.4 milliGRAM(s) Oral <User Schedule>     Medication Side Effects:  • Medication Side Effects or Adverse Reactions (new or ongoing)	None known    MENTAL STATUS EXAM:   • Level of Consciousness	Alert  • General Appearance	Well developed  • Body Habitus	Well nourished  • Hygiene	Fair  • Grooming	Fair  • Behavior	Cooperative  • Eye Contact	Good  • Relatedness	Fair  • Impulse Control	Normal  • Muscle Tone / Strength	Normal muscle tone/strength  • Abnormal Movements	No abnormal movements  • Gait / Station	Normal gait / station  • Speech Volume	Loud volume  • Speech Rate	Normal  • Speech Spontaneity	Normal  • Speech Articulation	Normal  • Mood	Normal  • Affect Quality	Euthymic  • Affect Range	Full  • Affect Congruence	Congruent  • Thought Process	Tangential, disorganized   • Thought Associations	Normal  • Thought Content	Unremarkable  • Perceptions	No abnormalities  • Oriented to Time	Yes  • Oriented to Place	Yes  • Oriented to Situation	Yes  • Oriented to Person	Yes  • Attention / Concentration	Normal  • Estimated Intelligence	Average  • Recent Memory	Normal  • Remote Memory	Normal  • Fund of Knowledge	Impaired  • Language	No abnormalities noted  • Judgment (regarding everyday events)	Poor  • Insight (regarding psychiatric illness)	Poor    SUICIDALITY:   • Suicidality (Interval)	none known    HOMICIDALITY/AGGRESSION:   • Homicidality/Aggression	aggressive in ED on arrival 2/2 medication non-compliance with behavioral decompensation    DIAGNOSIS DSM-V:    Psychiatric Diagnosis (Corresponds to DSM-IV Axis I, II):   HEALTH ISSUES - PROBLEM Dx:  Schizoaffective disorder, bipolar type             Medical Diagnosis (Corresponds to DSM-IV Axis III):  • Axis III	      ASSESSMENT OF CURRENT CONDITION:   Summary (include case differential, formulation and patient response to therapy): 66 yo single, unemployed male domiciled in group home x 2 yrs ([address]) with past psych hx of Bipolar, Schizoaffective disorder with multiple prior psych hospitalizations last ~1 mo ago at [facility], followed by ACT team Zurin at [facility] , no known suicide attempts; no active substance abuse or known history of complicated withdrawal; and PMHx of GERD BIB EMS activated by psychiatrist from ACT team, Dr Saunders on todays home visit. Pt alert lying in bed with loud speech and tangential, disorganized thought process. Pt with behavioral decompensation, mood instability, and aggression 2/2 medication non-compliance, awaiting inpatient psych admission given pt unable to care for self and danger to others, threatening psychiatrist.     Risk Assessment (consider static vs modifiable risk factors and protective factors; comment on level of risk for dangerous behavior): RF - medication noncompliance, aggressive, threatening speech. PF - residential stability.     PLAN  Inpatient psych admission for mood instability, awaiting bed

## 2022-06-13 NOTE — ED CDU PROVIDER SUBSEQUENT DAY NOTE - CROS ED SKIN ALL NEG
negative...
Feels ok  Left feels good  afebrile  LE wounds dressing intact   pulses present bty doppler  Doing well   supportive care
negative...
negative...

## 2022-06-13 NOTE — ED ADULT NURSE REASSESSMENT NOTE - GENERAL PATIENT STATE
anxious
comfortable appearance/resting/sleeping
comfortable appearance/cooperative
resting/sleeping
cooperative
comfortable appearance/cooperative
comfortable appearance/cooperative
comfortable appearance/resting/sleeping
comfortable appearance/cooperative

## 2022-06-13 NOTE — ED ADULT NURSE REASSESSMENT NOTE - STATUS
awaiting transfer, no change

## 2022-06-13 NOTE — ED CDU PROVIDER SUBSEQUENT DAY NOTE - HISTORY
No overnight events
No acute events overnight
no overnight events, remains medically cleared, pending inpatient psych bed placement

## 2022-08-20 ENCOUNTER — EMERGENCY (EMERGENCY)
Facility: HOSPITAL | Age: 67
LOS: 1 days | Discharge: HOME | End: 2022-08-20
Attending: EMERGENCY MEDICINE
Payer: MEDICARE

## 2022-08-20 VITALS
SYSTOLIC BLOOD PRESSURE: 136 MMHG | HEART RATE: 63 BPM | DIASTOLIC BLOOD PRESSURE: 78 MMHG | RESPIRATION RATE: 20 BRPM | TEMPERATURE: 99 F | OXYGEN SATURATION: 98 %

## 2022-08-20 VITALS
DIASTOLIC BLOOD PRESSURE: 72 MMHG | HEIGHT: 64 IN | TEMPERATURE: 98 F | RESPIRATION RATE: 98 BRPM | HEART RATE: 77 BPM | WEIGHT: 195.11 LBS | SYSTOLIC BLOOD PRESSURE: 144 MMHG | OXYGEN SATURATION: 98 %

## 2022-08-20 LAB
ALBUMIN SERPL ELPH-MCNC: 3.6 G/DL — SIGNIFICANT CHANGE UP (ref 3.3–5.2)
ALBUMIN SERPL ELPH-MCNC: 4.1 G/DL — SIGNIFICANT CHANGE UP (ref 3.3–5.2)
ALP SERPL-CCNC: 73 U/L — SIGNIFICANT CHANGE UP (ref 40–120)
ALP SERPL-CCNC: 79 U/L — SIGNIFICANT CHANGE UP (ref 40–120)
ALT FLD-CCNC: 10 U/L — SIGNIFICANT CHANGE UP
ALT FLD-CCNC: 12 U/L — SIGNIFICANT CHANGE UP
AMPHET UR-MCNC: NEGATIVE — SIGNIFICANT CHANGE UP
ANION GAP SERPL CALC-SCNC: 9 MMOL/L — SIGNIFICANT CHANGE UP (ref 5–17)
ANION GAP SERPL CALC-SCNC: 9 MMOL/L — SIGNIFICANT CHANGE UP (ref 5–17)
APAP SERPL-MCNC: <3 UG/ML — LOW (ref 10–26)
APPEARANCE UR: CLEAR — SIGNIFICANT CHANGE UP
AST SERPL-CCNC: 13 U/L — SIGNIFICANT CHANGE UP
AST SERPL-CCNC: 17 U/L — SIGNIFICANT CHANGE UP
BACTERIA # UR AUTO: ABNORMAL
BARBITURATES UR SCN-MCNC: NEGATIVE — SIGNIFICANT CHANGE UP
BASOPHILS # BLD AUTO: 0.06 K/UL — SIGNIFICANT CHANGE UP (ref 0–0.2)
BASOPHILS NFR BLD AUTO: 0.3 % — SIGNIFICANT CHANGE UP (ref 0–2)
BENZODIAZ UR-MCNC: NEGATIVE — SIGNIFICANT CHANGE UP
BILIRUB SERPL-MCNC: 0.4 MG/DL — SIGNIFICANT CHANGE UP (ref 0.4–2)
BILIRUB SERPL-MCNC: 0.4 MG/DL — SIGNIFICANT CHANGE UP (ref 0.4–2)
BILIRUB UR-MCNC: NEGATIVE — SIGNIFICANT CHANGE UP
BUN SERPL-MCNC: 10.4 MG/DL — SIGNIFICANT CHANGE UP (ref 8–20)
BUN SERPL-MCNC: 10.6 MG/DL — SIGNIFICANT CHANGE UP (ref 8–20)
CALCIUM SERPL-MCNC: 9.3 MG/DL — SIGNIFICANT CHANGE UP (ref 8.4–10.5)
CALCIUM SERPL-MCNC: 9.9 MG/DL — SIGNIFICANT CHANGE UP (ref 8.4–10.5)
CHLORIDE SERPL-SCNC: 100 MMOL/L — SIGNIFICANT CHANGE UP (ref 98–107)
CHLORIDE SERPL-SCNC: 98 MMOL/L — SIGNIFICANT CHANGE UP (ref 98–107)
CK SERPL-CCNC: 87 U/L — SIGNIFICANT CHANGE UP (ref 30–200)
CK SERPL-CCNC: 88 U/L — SIGNIFICANT CHANGE UP (ref 30–200)
CO2 SERPL-SCNC: 24 MMOL/L — SIGNIFICANT CHANGE UP (ref 22–29)
CO2 SERPL-SCNC: 25 MMOL/L — SIGNIFICANT CHANGE UP (ref 22–29)
COCAINE METAB.OTHER UR-MCNC: NEGATIVE — SIGNIFICANT CHANGE UP
COLOR SPEC: YELLOW — SIGNIFICANT CHANGE UP
CREAT SERPL-MCNC: 0.9 MG/DL — SIGNIFICANT CHANGE UP (ref 0.5–1.3)
CREAT SERPL-MCNC: 1.07 MG/DL — SIGNIFICANT CHANGE UP (ref 0.5–1.3)
DIFF PNL FLD: NEGATIVE — SIGNIFICANT CHANGE UP
EGFR: 76 ML/MIN/1.73M2 — SIGNIFICANT CHANGE UP
EGFR: 94 ML/MIN/1.73M2 — SIGNIFICANT CHANGE UP
EOSINOPHIL # BLD AUTO: 0.01 K/UL — SIGNIFICANT CHANGE UP (ref 0–0.5)
EOSINOPHIL NFR BLD AUTO: 0.1 % — SIGNIFICANT CHANGE UP (ref 0–6)
EPI CELLS # UR: SIGNIFICANT CHANGE UP
ETHANOL SERPL-MCNC: <10 MG/DL — SIGNIFICANT CHANGE UP (ref 0–9)
GLUCOSE SERPL-MCNC: 111 MG/DL — HIGH (ref 70–99)
GLUCOSE SERPL-MCNC: 99 MG/DL — SIGNIFICANT CHANGE UP (ref 70–99)
GLUCOSE UR QL: NEGATIVE MG/DL — SIGNIFICANT CHANGE UP
HCT VFR BLD CALC: 37.1 % — LOW (ref 39–50)
HCT VFR BLD CALC: 39.3 % — SIGNIFICANT CHANGE UP (ref 39–50)
HGB BLD-MCNC: 12.4 G/DL — LOW (ref 13–17)
HGB BLD-MCNC: 13.2 G/DL — SIGNIFICANT CHANGE UP (ref 13–17)
IMM GRANULOCYTES NFR BLD AUTO: 0.3 % — SIGNIFICANT CHANGE UP (ref 0–1.5)
KETONES UR-MCNC: NEGATIVE — SIGNIFICANT CHANGE UP
LACTATE BLDV-MCNC: 1.4 MMOL/L — SIGNIFICANT CHANGE UP (ref 0.5–2)
LEUKOCYTE ESTERASE UR-ACNC: NEGATIVE — SIGNIFICANT CHANGE UP
LITHIUM SERPL-MCNC: 1.16 MMOL/L — SIGNIFICANT CHANGE UP (ref 0.5–1.5)
LYMPHOCYTES # BLD AUTO: 1.11 K/UL — SIGNIFICANT CHANGE UP (ref 1–3.3)
LYMPHOCYTES # BLD AUTO: 6.5 % — LOW (ref 13–44)
MCHC RBC-ENTMCNC: 26.2 PG — LOW (ref 27–34)
MCHC RBC-ENTMCNC: 26.5 PG — LOW (ref 27–34)
MCHC RBC-ENTMCNC: 33.4 GM/DL — SIGNIFICANT CHANGE UP (ref 32–36)
MCHC RBC-ENTMCNC: 33.6 GM/DL — SIGNIFICANT CHANGE UP (ref 32–36)
MCV RBC AUTO: 78.4 FL — LOW (ref 80–100)
MCV RBC AUTO: 78.8 FL — LOW (ref 80–100)
METHADONE UR-MCNC: NEGATIVE — SIGNIFICANT CHANGE UP
MONOCYTES # BLD AUTO: 0.64 K/UL — SIGNIFICANT CHANGE UP (ref 0–0.9)
MONOCYTES NFR BLD AUTO: 3.7 % — SIGNIFICANT CHANGE UP (ref 2–14)
NEUTROPHILS # BLD AUTO: 15.32 K/UL — HIGH (ref 1.8–7.4)
NEUTROPHILS NFR BLD AUTO: 89.1 % — HIGH (ref 43–77)
NITRITE UR-MCNC: NEGATIVE — SIGNIFICANT CHANGE UP
OPIATES UR-MCNC: NEGATIVE — SIGNIFICANT CHANGE UP
PCP SPEC-MCNC: SIGNIFICANT CHANGE UP
PCP UR-MCNC: NEGATIVE — SIGNIFICANT CHANGE UP
PH UR: 7 — SIGNIFICANT CHANGE UP (ref 5–8)
PLATELET # BLD AUTO: 380 K/UL — SIGNIFICANT CHANGE UP (ref 150–400)
PLATELET # BLD AUTO: 385 K/UL — SIGNIFICANT CHANGE UP (ref 150–400)
POTASSIUM SERPL-MCNC: 4.3 MMOL/L — SIGNIFICANT CHANGE UP (ref 3.5–5.3)
POTASSIUM SERPL-MCNC: 4.3 MMOL/L — SIGNIFICANT CHANGE UP (ref 3.5–5.3)
POTASSIUM SERPL-SCNC: 4.3 MMOL/L — SIGNIFICANT CHANGE UP (ref 3.5–5.3)
POTASSIUM SERPL-SCNC: 4.3 MMOL/L — SIGNIFICANT CHANGE UP (ref 3.5–5.3)
PROT SERPL-MCNC: 6.7 G/DL — SIGNIFICANT CHANGE UP (ref 6.6–8.7)
PROT SERPL-MCNC: 7.7 G/DL — SIGNIFICANT CHANGE UP (ref 6.6–8.7)
PROT UR-MCNC: NEGATIVE — SIGNIFICANT CHANGE UP
RAPID RVP RESULT: SIGNIFICANT CHANGE UP
RBC # BLD: 4.73 M/UL — SIGNIFICANT CHANGE UP (ref 4.2–5.8)
RBC # BLD: 4.99 M/UL — SIGNIFICANT CHANGE UP (ref 4.2–5.8)
RBC # FLD: 13.7 % — SIGNIFICANT CHANGE UP (ref 10.3–14.5)
RBC # FLD: 13.8 % — SIGNIFICANT CHANGE UP (ref 10.3–14.5)
RBC CASTS # UR COMP ASSIST: SIGNIFICANT CHANGE UP /HPF (ref 0–4)
SALICYLATES SERPL-MCNC: <0.6 MG/DL — LOW (ref 10–20)
SARS-COV-2 RNA SPEC QL NAA+PROBE: SIGNIFICANT CHANGE UP
SODIUM SERPL-SCNC: 132 MMOL/L — LOW (ref 135–145)
SODIUM SERPL-SCNC: 133 MMOL/L — LOW (ref 135–145)
SP GR SPEC: 1 — LOW (ref 1.01–1.02)
THC UR QL: NEGATIVE — SIGNIFICANT CHANGE UP
TROPONIN T SERPL-MCNC: <0.01 NG/ML — SIGNIFICANT CHANGE UP (ref 0–0.06)
TROPONIN T SERPL-MCNC: <0.01 NG/ML — SIGNIFICANT CHANGE UP (ref 0–0.06)
UROBILINOGEN FLD QL: NEGATIVE MG/DL — SIGNIFICANT CHANGE UP
WBC # BLD: 16.41 K/UL — HIGH (ref 3.8–10.5)
WBC # BLD: 17.2 K/UL — HIGH (ref 3.8–10.5)
WBC # FLD AUTO: 16.41 K/UL — HIGH (ref 3.8–10.5)
WBC # FLD AUTO: 17.2 K/UL — HIGH (ref 3.8–10.5)
WBC UR QL: SIGNIFICANT CHANGE UP /HPF (ref 0–5)

## 2022-08-20 PROCEDURE — 80053 COMPREHEN METABOLIC PANEL: CPT

## 2022-08-20 PROCEDURE — 36415 COLL VENOUS BLD VENIPUNCTURE: CPT

## 2022-08-20 PROCEDURE — 0225U NFCT DS DNA&RNA 21 SARSCOV2: CPT

## 2022-08-20 PROCEDURE — 80307 DRUG TEST PRSMV CHEM ANLYZR: CPT

## 2022-08-20 PROCEDURE — 71045 X-RAY EXAM CHEST 1 VIEW: CPT

## 2022-08-20 PROCEDURE — 84484 ASSAY OF TROPONIN QUANT: CPT

## 2022-08-20 PROCEDURE — 99291 CRITICAL CARE FIRST HOUR: CPT

## 2022-08-20 PROCEDURE — 99285 EMERGENCY DEPT VISIT HI MDM: CPT | Mod: 25

## 2022-08-20 PROCEDURE — 70450 CT HEAD/BRAIN W/O DYE: CPT | Mod: 26,MA

## 2022-08-20 PROCEDURE — 82962 GLUCOSE BLOOD TEST: CPT

## 2022-08-20 PROCEDURE — 83605 ASSAY OF LACTIC ACID: CPT

## 2022-08-20 PROCEDURE — 85025 COMPLETE CBC W/AUTO DIFF WBC: CPT

## 2022-08-20 PROCEDURE — 82550 ASSAY OF CK (CPK): CPT

## 2022-08-20 PROCEDURE — 93010 ELECTROCARDIOGRAM REPORT: CPT

## 2022-08-20 PROCEDURE — 81001 URINALYSIS AUTO W/SCOPE: CPT

## 2022-08-20 PROCEDURE — 93005 ELECTROCARDIOGRAM TRACING: CPT

## 2022-08-20 PROCEDURE — 70450 CT HEAD/BRAIN W/O DYE: CPT | Mod: MA

## 2022-08-20 PROCEDURE — 85027 COMPLETE CBC AUTOMATED: CPT

## 2022-08-20 PROCEDURE — 71045 X-RAY EXAM CHEST 1 VIEW: CPT | Mod: 26

## 2022-08-20 PROCEDURE — 80178 ASSAY OF LITHIUM: CPT

## 2022-08-20 RX ORDER — SODIUM CHLORIDE 9 MG/ML
1000 INJECTION INTRAMUSCULAR; INTRAVENOUS; SUBCUTANEOUS ONCE
Refills: 0 | Status: COMPLETED | OUTPATIENT
Start: 2022-08-20 | End: 2022-08-20

## 2022-08-20 NOTE — ED PROVIDER NOTE - CLINICAL SUMMARY MEDICAL DECISION MAKING FREE TEXT BOX
pt had intervan change of mental status for 3 hr where he was lethargic and not responding and now awake and alert and report sno compalitns, will r/o ich, ddx includes syncope, catatonia, will do priority ct head

## 2022-08-20 NOTE — ED ADULT TRIAGE NOTE - CHIEF COMPLAINT QUOTE
Patient presents to ER for medical evaluation, patient is [facility], [facility] requesting lab work, patient is awake and alert, resp even/unlabored.

## 2022-08-20 NOTE — ED ADULT NURSE REASSESSMENT NOTE - NS ED NURSE REASSESS COMMENT FT1
received report from day RN, assumed care of patient at change of shift.  SW arranged for patient to return to [facility].  to be discharged, and IV to be removed.  southks aide at bedside.  pt calm and appropriate.  pt tolerating PO intake and is eating at the moment.  MD arguelles aware

## 2022-08-20 NOTE — CHART NOTE - NSCHARTNOTEFT_GEN_A_CORE
SW note: Worker alerted by MD that pt is medically cleared for d/c back to [facility]. Per MD, she had given doc-doc to the covering  covering the pts unit [facility] to confirm the pts return. Worker contacted  EMS (Yanna) to arrange a carito. Nurse to EMS provided. NEAF created and uploaded into AC. Transport letter left with transfer packet. No other SW needs at this time.

## 2022-08-20 NOTE — ED ADULT NURSE NOTE - CHIEF COMPLAINT QUOTE
Patient presents to ER for medical evaluation, patient is [facility], [facility] requesting lab work, patient is awake and alert, resp even/unlabored.

## 2022-08-20 NOTE — ED ADULT NURSE NOTE - NS ED NURSE AMBULANCES2
[facility]

## 2022-08-20 NOTE — ED PROVIDER NOTE - PROGRESS NOTE DETAILS
pt walking around and aox3, cleared medically and will follow psych recommendations spoke to doctor at [facility], sent back to inpatient psych, transport arranged  by  Rodger

## 2022-08-20 NOTE — ED ADULT NURSE NOTE - OBJECTIVE STATEMENT
Pt A&Ox4, patient was found not responding and altered at [facility], at present time patient is awake and alert, resp even/unlabored. No complaints at this time, aid at bedside.

## 2022-08-20 NOTE — ED PROVIDER NOTE - CARE PLAN
Principal Discharge DX:	Syncope  Secondary Diagnosis:	Catatonia  Secondary Diagnosis:	Malingering   1

## 2022-08-20 NOTE — ED PROVIDER NOTE - OBJECTIVE STATEMENT
66 y/o M with h/o schizoaffective disorder sent from [facility] for altered mental status since 8 am when he was in restroom and they could not get him out and he started becoming more lethargic, no seizure noted all this time. When ems arrived, pt was lethargic and responsive to only pain .Pt has 3 episodes of vomit at [facility], No fever, chills, diarrhea. Pt recently had Na of 132.

## 2022-10-11 NOTE — ASU PATIENT PROFILE, ADULT - REASON FOR ADMISSION, PROFILE
C Silver Nitrate Text: The wound bed was treated with silver nitrate after the biopsy was performed.

## 2022-10-13 NOTE — ED ADULT NURSE NOTE - MUSCULOSKELETAL WDL
[NL] : warm, clear [FreeTextEntry7] : coughing in office but no wheezing Full range of motion of upper and lower extremities, no joint tenderness/swelling.

## 2022-12-29 ENCOUNTER — APPOINTMENT (OUTPATIENT)
Dept: UROLOGY | Facility: CLINIC | Age: 67
End: 2022-12-29
Payer: MEDICARE

## 2022-12-29 VITALS
BODY MASS INDEX: 31.92 KG/M2 | HEIGHT: 64 IN | WEIGHT: 187 LBS | HEART RATE: 61 BPM | DIASTOLIC BLOOD PRESSURE: 68 MMHG | SYSTOLIC BLOOD PRESSURE: 124 MMHG

## 2022-12-29 PROCEDURE — 99204 OFFICE O/P NEW MOD 45 MIN: CPT

## 2022-12-29 RX ORDER — AMANTADINE HYDROCHLORIDE 100 MG/1
100 CAPSULE ORAL TWICE DAILY
Qty: 60 | Refills: 0 | Status: ACTIVE | COMMUNITY
Start: 2022-12-29

## 2022-12-29 RX ORDER — TRAZODONE HYDROCHLORIDE 100 MG/1
100 TABLET ORAL
Qty: 30 | Refills: 3 | Status: ACTIVE | COMMUNITY
Start: 2022-12-29

## 2022-12-29 RX ORDER — ISONIAZID 300 MG/1
300 TABLET ORAL DAILY
Qty: 30 | Refills: 0 | Status: ACTIVE | COMMUNITY
Start: 2022-12-29

## 2022-12-29 RX ORDER — LITHIUM CARBONATE 300 MG/1
300 TABLET ORAL TWICE DAILY
Qty: 60 | Refills: 0 | Status: ACTIVE | COMMUNITY
Start: 2022-12-29 | End: 1900-01-01

## 2022-12-29 RX ORDER — LORAZEPAM 1 MG/1
1 TABLET ORAL
Qty: 60 | Refills: 0 | Status: ACTIVE | COMMUNITY
Start: 2022-12-29

## 2022-12-29 RX ORDER — FERROUS SULFATE TAB EC 325 MG (65 MG FE EQUIVALENT) 325 (65 FE) MG
325 (65 FE) TABLET DELAYED RESPONSE ORAL DAILY
Qty: 30 | Refills: 0 | Status: ACTIVE | COMMUNITY
Start: 2022-12-29

## 2022-12-29 RX ORDER — FLUPHENAZINE HYDROCHLORIDE 5 MG/1
5 TABLET, FILM COATED ORAL
Qty: 60 | Refills: 0 | Status: ACTIVE | COMMUNITY
Start: 2022-12-29

## 2022-12-29 RX ORDER — PYRIDOXINE HCL (VITAMIN B6) 50 MG
50 TABLET ORAL DAILY
Qty: 30 | Refills: 0 | Status: ACTIVE | COMMUNITY
Start: 2022-12-29

## 2022-12-29 RX ORDER — TAMSULOSIN HYDROCHLORIDE 0.4 MG/1
0.4 CAPSULE ORAL
Qty: 30 | Refills: 1 | Status: ACTIVE | COMMUNITY
Start: 2022-12-29

## 2022-12-29 RX ORDER — AMLODIPINE BESYLATE 10 MG/1
10 TABLET ORAL DAILY
Qty: 30 | Refills: 0 | Status: ACTIVE | COMMUNITY
Start: 2022-12-29

## 2022-12-29 NOTE — PHYSICAL EXAM
[General Appearance - Well Developed] : well developed [General Appearance - Well Nourished] : well nourished [Normal Appearance] : normal appearance [Well Groomed] : well groomed [General Appearance - In No Acute Distress] : no acute distress [Edema] : no peripheral edema [Respiration, Rhythm And Depth] : normal respiratory rhythm and effort [Exaggerated Use Of Accessory Muscles For Inspiration] : no accessory muscle use [Abdomen Tenderness] : non-tender [Abdomen Soft] : soft [Costovertebral Angle Tenderness] : no ~M costovertebral angle tenderness [Urethral Meatus] : meatus normal [Penis Abnormality] : normal uncircumcised penis [Urinary Bladder Findings] : the bladder was normal on palpation [Scrotum] : the scrotum was normal [Rectal Exam - Seminal Vesicles] : the seminal vesicles were normal [Testes Mass (___cm)] : there were no testicular masses [Testes Tenderness] : no tenderness of the testes [Anus Abnormality] : the anus and perineum were normal [Rectal Exam - Rectum] : digital rectal exam was normal [Prostate Enlargement] : the prostate was not enlarged [Prostate Tenderness] : the prostate was not tender [No Prostate Nodules] : no prostate nodules [Prostate Size ___ (0-4)] : prostate size [unfilled] (scale: 0-4) [Normal Station and Gait] : the gait and station were normal for the patient's age [] : no rash [No Focal Deficits] : no focal deficits [Oriented To Time, Place, And Person] : oriented to person, place, and time [Affect] : the affect was normal [Mood] : the mood was normal [Not Anxious] : not anxious [Inguinal Lymph Nodes Enlarged Bilaterally] : inguinal [FreeTextEntry1] : atrophic left testicle

## 2022-12-29 NOTE — LETTER BODY
[Dear  ___] : Dear  [unfilled], [Courtesy Letter:] : I had the pleasure of seeing your patient, [unfilled], in my office today. [Please see my note below.] : Please see my note below. [Sincerely,] : Sincerely, [FreeTextEntry3] : Ed\par \par Danny Plaza MD\par [facility]\par  of Urology\par [employer] at [facility]\par

## 2022-12-29 NOTE — ASSESSMENT
[FreeTextEntry1] : Impression:\par \par elevatedd PSA\par \par Plan:\par \par RDP for prostate cancer\par \par Prostate MRi\par free and total PSA

## 2022-12-29 NOTE — HISTORY OF PRESENT ILLNESS
[FreeTextEntry1] : Patient had an elevated PSA of 6.2. he does not recall having an abnormal prostate lab test previously.  No abnormal prostate exam. he is voiding with a good stream in the morning.  Occasionally has some dysuira.  He has nocturia X 2.  Weaker stream when he first arises but it improves during the day.

## 2023-01-24 ENCOUNTER — NON-APPOINTMENT (OUTPATIENT)
Age: 68
End: 2023-01-24

## 2023-01-26 ENCOUNTER — APPOINTMENT (OUTPATIENT)
Dept: UROLOGY | Facility: CLINIC | Age: 68
End: 2023-01-26

## 2023-01-31 ENCOUNTER — APPOINTMENT (OUTPATIENT)
Dept: MRI IMAGING | Facility: CLINIC | Age: 68
End: 2023-01-31
Payer: MEDICARE

## 2023-01-31 ENCOUNTER — RESULT REVIEW (OUTPATIENT)
Age: 68
End: 2023-01-31

## 2023-01-31 ENCOUNTER — OUTPATIENT (OUTPATIENT)
Dept: OUTPATIENT SERVICES | Facility: HOSPITAL | Age: 68
LOS: 1 days | End: 2023-01-31
Payer: MEDICARE

## 2023-01-31 DIAGNOSIS — R97.20 ELEVATED PROSTATE SPECIFIC ANTIGEN [PSA]: ICD-10-CM

## 2023-01-31 PROCEDURE — 72197 MRI PELVIS W/O & W/DYE: CPT | Mod: 26,MH

## 2023-01-31 PROCEDURE — A9585: CPT

## 2023-01-31 PROCEDURE — 76498 UNLISTED MR PROCEDURE: CPT

## 2023-01-31 PROCEDURE — 72197 MRI PELVIS W/O & W/DYE: CPT

## 2023-01-31 PROCEDURE — 76498P: CUSTOM | Mod: 26,MH

## 2023-02-08 ENCOUNTER — NON-APPOINTMENT (OUTPATIENT)
Age: 68
End: 2023-02-08

## 2023-02-08 ENCOUNTER — APPOINTMENT (OUTPATIENT)
Dept: UROLOGY | Facility: CLINIC | Age: 68
End: 2023-02-08
Payer: MEDICARE

## 2023-02-08 ENCOUNTER — APPOINTMENT (OUTPATIENT)
Dept: UROLOGY | Facility: CLINIC | Age: 68
End: 2023-02-08

## 2023-02-08 VITALS
HEART RATE: 65 BPM | DIASTOLIC BLOOD PRESSURE: 56 MMHG | WEIGHT: 176 LBS | HEIGHT: 64 IN | SYSTOLIC BLOOD PRESSURE: 117 MMHG | TEMPERATURE: 98.2 F | BODY MASS INDEX: 30.05 KG/M2 | RESPIRATION RATE: 15 BRPM | OXYGEN SATURATION: 98 %

## 2023-02-08 PROCEDURE — 99213 OFFICE O/P EST LOW 20 MIN: CPT

## 2023-02-08 NOTE — HISTORY OF PRESENT ILLNESS
[None] : no symptoms [FreeTextEntry1] : 68 yo presents today for an initial visit for a prostate biopsy consultation. RDP program. Accompanied by his caretaker, Ilana from [facility]. Hx of schizophrenia and bipolar disorder. \par Hx of aggressive behavior, BPH, GERD, HTN, ankle surgery. \par MRI prostate 1.31.23.\par PV 35 mL\par PIRADS 4 and PIRADS 3 lesion.\par Also accompanied by his , Марина- Stateless speaking. \par Recent PSA 6.2.\par He is in need of fusion transperineal prostate biopsy and is here for this consultation.\par Referral from Dr. Plaza.\par \par

## 2023-02-08 NOTE — ASSESSMENT
[FreeTextEntry1] : Pt. exhibited unsettled behavior in the office and was not able to sit in patient chair in room. He appeared agitated and was non-compliant even when told to relax and calm down by his caretakers.\par It was determined that his biopsy be performed under general anesthesia by Dr. Yang at [facility]. \par Referral initiated to schedule his procedure. This was discussed with Ilana his caretaker from [facility]. All questions answered and all concerns addressed.

## 2023-02-08 NOTE — REASON FOR VISIT
[Initial Visit ___] : [unfilled] is here today for an initial visit  for [unfilled] [Other: ______] : provided by JUAN [Time Spent: ____ minutes] : Total time spent using  services: [unfilled] minutes. The patient's primary language is not English thus required  services. [TWNoteComboBox1] : Niuean

## 2023-02-08 NOTE — PHYSICAL EXAM
[Normal Appearance] : normal appearance [General Appearance - In No Acute Distress] : no acute distress [Edema] : no peripheral edema [] : no respiratory distress [Abdomen Soft] : soft [Urethral Meatus] : meatus normal [Normal Station and Gait] : the gait and station were normal for the patient's age [Skin Color & Pigmentation] : normal skin color and pigmentation [No Focal Deficits] : no focal deficits [No Palpable Adenopathy] : no palpable adenopathy [FreeTextEntry1] : Pt. was agitated in office. Not able to sit still in chair.

## 2023-02-17 ENCOUNTER — APPOINTMENT (OUTPATIENT)
Dept: UROLOGY | Facility: CLINIC | Age: 68
End: 2023-02-17
Payer: MEDICARE

## 2023-02-17 ENCOUNTER — RESULT CHARGE (OUTPATIENT)
Age: 68
End: 2023-02-17

## 2023-02-17 VITALS
SYSTOLIC BLOOD PRESSURE: 138 MMHG | HEIGHT: 64 IN | DIASTOLIC BLOOD PRESSURE: 70 MMHG | HEART RATE: 70 BPM | TEMPERATURE: 96.4 F | WEIGHT: 179 LBS | OXYGEN SATURATION: 99 % | BODY MASS INDEX: 30.56 KG/M2

## 2023-02-17 LAB
BILIRUB UR QL STRIP: NEGATIVE
CLARITY UR: CLEAR
COLLECTION METHOD: NORMAL
GLUCOSE UR-MCNC: NEGATIVE
HCG UR QL: 0.2 EU/DL
HGB UR QL STRIP.AUTO: NEGATIVE
KETONES UR-MCNC: NEGATIVE
LEUKOCYTE ESTERASE UR QL STRIP: NEGATIVE
NITRITE UR QL STRIP: NEGATIVE
PH UR STRIP: 6.5
PROT UR STRIP-MCNC: NEGATIVE
SP GR UR STRIP: 1.01

## 2023-02-17 PROCEDURE — 99214 OFFICE O/P EST MOD 30 MIN: CPT

## 2023-02-17 NOTE — PHYSICAL EXAM
[Normal Appearance] : normal appearance [General Appearance - In No Acute Distress] : no acute distress [Abdomen Soft] : soft [Urethral Meatus] : meatus normal [Urinary Bladder Findings] : the bladder was normal on palpation [Skin Color & Pigmentation] : normal skin color and pigmentation [Edema] : no peripheral edema [] : no respiratory distress [Normal Station and Gait] : the gait and station were normal for the patient's age [No Focal Deficits] : no focal deficits [No Palpable Adenopathy] : no palpable adenopathy [FreeTextEntry1] : Pt. was agitated in office.

## 2023-02-17 NOTE — REASON FOR VISIT
[Other: ______] : provided by JUAN [Initial Visit ___] : [unfilled] is here today for an initial visit  for [unfilled] [TWNoteComboBox1] : [place]ican

## 2023-02-17 NOTE — HISTORY OF PRESENT ILLNESS
[None] : no symptoms [FreeTextEntry1] : 68 yo presents today for an initial visit for a prostate biopsy consultation.  Hx of schizophrenia and bipolar disorder. \par Hx of aggressive behavior, BPH, GERD, HTN, ankle surgery. \par MRI prostate 1.31.23.\par PS = 35 mL\par PIRADS 4 and PIRADS 3 lesion.. \par Recent PSA 6.2.\par He is in need of fusion transperineal prostate biopsy and is here for this consultation.\par Fam Hx: neg for prostate cancer\par

## 2023-02-17 NOTE — ASSESSMENT
[FreeTextEntry1] : \par I had a discussion with the patient regarding the implication of an elevated PSA and the need for further evaluation to biopsy the prostate using the transperineal approach. \par \par The potential side effects including bleeding and infection were also detailed. Additionally, we discussed the potential implication of a positive biopsy for prostate cancer, and depending on the grade and stage of the cancer the need for treatment including, active surveillance, radiation, radiation seed implants and surgery.  \par \par The patient has agreed to proceed with prostate biopsy. He will stop all aspirin and aspirin like products 10 days prior to the biopsy. There is no need for pre-procedural antibiotics, and he will self-administer a cleansing Fleet's enema just prior to the biopsy.  \par \par Biopsy to be done under GA or sedation, depending on what is required at the time of biopsy.\par Navya Shankar was available as .\par \par

## 2023-03-08 ENCOUNTER — APPOINTMENT (OUTPATIENT)
Dept: UROLOGY | Facility: CLINIC | Age: 68
End: 2023-03-08

## 2023-03-15 ENCOUNTER — APPOINTMENT (OUTPATIENT)
Dept: GASTROENTEROLOGY | Facility: CLINIC | Age: 68
End: 2023-03-15
Payer: MEDICARE

## 2023-03-15 VITALS
HEIGHT: 64 IN | DIASTOLIC BLOOD PRESSURE: 66 MMHG | SYSTOLIC BLOOD PRESSURE: 136 MMHG | RESPIRATION RATE: 16 BRPM | TEMPERATURE: 98.1 F | WEIGHT: 180 LBS | HEART RATE: 70 BPM | OXYGEN SATURATION: 98 % | BODY MASS INDEX: 30.73 KG/M2

## 2023-03-15 DIAGNOSIS — R10.9 UNSPECIFIED ABDOMINAL PAIN: ICD-10-CM

## 2023-03-15 DIAGNOSIS — Z12.11 ENCOUNTER FOR SCREENING FOR MALIGNANT NEOPLASM OF COLON: ICD-10-CM

## 2023-03-15 PROCEDURE — 99213 OFFICE O/P EST LOW 20 MIN: CPT

## 2023-03-15 NOTE — PHYSICAL EXAM
[Alert] : alert [Normal Voice/Communication] : normal voice/communication [Healthy Appearing] : healthy appearing [No Acute Distress] : no acute distress [Sclera] : the sclera and conjunctiva were normal [Hearing Threshold Finger Rub Not Cavalier] : hearing was normal [Normal Lips/Gums] : the lips and gums were normal [Normal Appearance] : the appearance of the neck was normal [No Respiratory Distress] : no respiratory distress [Respiration, Rhythm And Depth] : normal respiratory rhythm and effort [Heart Rate And Rhythm] : heart rate was normal and rhythm regular [Bowel Sounds] : normal bowel sounds [Abdomen Tenderness] : non-tender [No Masses] : no abdominal mass palpated [Abdomen Soft] : soft [] : no hepatosplenomegaly [Oriented To Time, Place, And Person] : oriented to person, place, and time

## 2023-03-15 NOTE — HISTORY OF PRESENT ILLNESS
[FreeTextEntry1] : The patient was initially seen in May 2021 for colon cancer screening.  At that time he had never undergone a colonoscopy.  He denied any abdominal pain, nausea or vomiting.  There was no family history of colon cancer.  He did have a history of occasional constipation and if he strains he might see some blood on the tissue.  He also noted rare bouts of heartburn but no dysphagia.  His appetite and weight was stable.  He never followed up for the exam which was scheduled.  He returns today with a request from his physician for colon cancer screening.  He has bipolar and apparently schizophrenic and now resides at [facility].  He has no new complaints and when told he was here for screening colonoscopy he said he did not want to proceed with the examination and refused to proceed any further.

## 2023-03-15 NOTE — ASSESSMENT
[FreeTextEntry1] : The patient is certainly a candidate for screening colonoscopy but he is refusing examination despite having been counseled as to the necessity of this exam and the reason [facility] colonoscopies are performed.  He still continued to refuse.  Therefore he will simply be seen again as needed.

## 2023-03-16 ENCOUNTER — NON-APPOINTMENT (OUTPATIENT)
Age: 68
End: 2023-03-16

## 2023-03-20 ENCOUNTER — NON-APPOINTMENT (OUTPATIENT)
Age: 68
End: 2023-03-20

## 2023-03-21 ENCOUNTER — APPOINTMENT (OUTPATIENT)
Dept: UROLOGY | Facility: HOSPITAL | Age: 68
End: 2023-03-21

## 2023-03-23 ENCOUNTER — APPOINTMENT (OUTPATIENT)
Dept: CARDIOLOGY | Facility: CLINIC | Age: 68
End: 2023-03-23
Payer: MEDICARE

## 2023-03-23 ENCOUNTER — NON-APPOINTMENT (OUTPATIENT)
Age: 68
End: 2023-03-23

## 2023-03-23 VITALS
HEART RATE: 61 BPM | HEIGHT: 64 IN | DIASTOLIC BLOOD PRESSURE: 68 MMHG | BODY MASS INDEX: 30.56 KG/M2 | TEMPERATURE: 98.8 F | OXYGEN SATURATION: 99 % | SYSTOLIC BLOOD PRESSURE: 115 MMHG | WEIGHT: 179 LBS

## 2023-03-23 DIAGNOSIS — R60.0 LOCALIZED EDEMA: ICD-10-CM

## 2023-03-23 DIAGNOSIS — F25.9 SCHIZOAFFECTIVE DISORDER, UNSPECIFIED: ICD-10-CM

## 2023-03-23 DIAGNOSIS — I10 ESSENTIAL (PRIMARY) HYPERTENSION: ICD-10-CM

## 2023-03-23 PROCEDURE — 99203 OFFICE O/P NEW LOW 30 MIN: CPT

## 2023-03-23 PROCEDURE — 93000 ELECTROCARDIOGRAM COMPLETE: CPT

## 2023-03-23 RX ORDER — POLYETHYLENE GLYCOL-3350, SODIUM CHLORIDE, POTASSIUM CHLORIDE AND SODIUM BICARBONATE 420; 11.2; 5.72; 1.48 G/438.4G; G/438.4G; G/438.4G; G/438.4G
420 POWDER, FOR SOLUTION ORAL
Qty: 4000 | Refills: 0 | Status: DISCONTINUED | COMMUNITY
Start: 2021-05-05 | End: 2023-03-23

## 2023-03-23 RX ORDER — (SALINE) 19; 7 G/133ML; G/133ML
ENEMA RECTAL
Qty: 1 | Refills: 0 | Status: DISCONTINUED | COMMUNITY
Start: 2023-02-17 | End: 2023-03-23

## 2023-03-23 NOTE — DISCUSSION/SUMMARY
[FreeTextEntry1] : 67M h/o obesity (BMI 30), Schizoaffective disorder resides at [place], HTN (on amlodipine 10mg) and LE edema (on Lasix), GERD, BPH with recent elevated PSA planning for prostate biopsy under anesthesia, presents for cardiac evaluation. \par \par No cardiac complains, no edema on exam, EKG within normal limit, suspect prior leg edema related to high dose amlodipine use, consider switching to losartan and monitor off Lasix. Check Echocardiogram for structural heart function.\par \par \par 1. HTN\par -recommend to change amlodipine to losartan 100mg and stop Lasix use\par -await TTE\par \par 2. Schizoaffective disorder\par -on current antipsychotic agents, QTc normal.\par \par 3. Elevated PSA\par -follow up with urology, no cardiac contraindication for biopsy under aneshthesia. \par \par \par \par Follow up as needed if Echocardiogram is normal. \par  [EKG obtained to assist in diagnosis and management of assessed problem(s)] : EKG obtained to assist in diagnosis and management of assessed problem(s)

## 2023-03-23 NOTE — HISTORY OF PRESENT ILLNESS
[FreeTextEntry1] : 67M h/o obesity (BMI 30), Schizoaffective disorder resides at [place], HTN (on amlodipine 10mg) and LE edema (on Lasix), GERD, BPH with recent elevated PSA planning for prostate biopsy under anesthesia, presents for cardiac evaluation. \par \par Patient presents with his facility . Reports feeling well, denies chest pain or shortness of breath, only walking within the facility along the [place]. Had prior ankle surgery about 6 years at at [facility]. He reports resolved LE swelling recently. \par \par \par No CAD or stroke in family\par Nonsmoker, no alcohol use \par

## 2023-03-24 ENCOUNTER — INPATIENT (INPATIENT)
Facility: HOSPITAL | Age: 68
LOS: 1 days | Discharge: PSYCHIATRIC OR SUBSTANCE USE FACILITY | DRG: 309 | End: 2023-03-26
Admitting: INTERNAL MEDICINE
Payer: MEDICARE

## 2023-03-24 VITALS
RESPIRATION RATE: 20 BRPM | HEIGHT: 68 IN | DIASTOLIC BLOOD PRESSURE: 68 MMHG | HEART RATE: 40 BPM | SYSTOLIC BLOOD PRESSURE: 110 MMHG | WEIGHT: 164.91 LBS | OXYGEN SATURATION: 97 % | TEMPERATURE: 98 F

## 2023-03-24 DIAGNOSIS — R00.1 BRADYCARDIA, UNSPECIFIED: ICD-10-CM

## 2023-03-24 LAB
ALBUMIN SERPL ELPH-MCNC: 3.9 G/DL — SIGNIFICANT CHANGE UP (ref 3.3–5.2)
ALP SERPL-CCNC: 69 U/L — SIGNIFICANT CHANGE UP (ref 40–120)
ALT FLD-CCNC: 31 U/L — SIGNIFICANT CHANGE UP
ANION GAP SERPL CALC-SCNC: 8 MMOL/L — SIGNIFICANT CHANGE UP (ref 5–17)
AST SERPL-CCNC: 65 U/L — HIGH
BASOPHILS # BLD AUTO: 0.06 K/UL — SIGNIFICANT CHANGE UP (ref 0–0.2)
BASOPHILS NFR BLD AUTO: 0.7 % — SIGNIFICANT CHANGE UP (ref 0–2)
BILIRUB SERPL-MCNC: 0.4 MG/DL — SIGNIFICANT CHANGE UP (ref 0.4–2)
BUN SERPL-MCNC: 18.1 MG/DL — SIGNIFICANT CHANGE UP (ref 8–20)
CALCIUM SERPL-MCNC: 9 MG/DL — SIGNIFICANT CHANGE UP (ref 8.4–10.5)
CHLORIDE SERPL-SCNC: 98 MMOL/L — SIGNIFICANT CHANGE UP (ref 96–108)
CO2 SERPL-SCNC: 27 MMOL/L — SIGNIFICANT CHANGE UP (ref 22–29)
CREAT SERPL-MCNC: 1.13 MG/DL — SIGNIFICANT CHANGE UP (ref 0.5–1.3)
EGFR: 71 ML/MIN/1.73M2 — SIGNIFICANT CHANGE UP
EOSINOPHIL # BLD AUTO: 0.13 K/UL — SIGNIFICANT CHANGE UP (ref 0–0.5)
EOSINOPHIL NFR BLD AUTO: 1.5 % — SIGNIFICANT CHANGE UP (ref 0–6)
FLUAV AG NPH QL: SIGNIFICANT CHANGE UP
FLUBV AG NPH QL: SIGNIFICANT CHANGE UP
GLUCOSE SERPL-MCNC: 106 MG/DL — HIGH (ref 70–99)
HCT VFR BLD CALC: 37.2 % — LOW (ref 39–50)
HGB BLD-MCNC: 12.7 G/DL — LOW (ref 13–17)
IMM GRANULOCYTES NFR BLD AUTO: 0.2 % — SIGNIFICANT CHANGE UP (ref 0–0.9)
LITHIUM SERPL-MCNC: 0.7 MMOL/L — SIGNIFICANT CHANGE UP (ref 0.5–1.5)
LYMPHOCYTES # BLD AUTO: 1.23 K/UL — SIGNIFICANT CHANGE UP (ref 1–3.3)
LYMPHOCYTES # BLD AUTO: 14.6 % — SIGNIFICANT CHANGE UP (ref 13–44)
MAGNESIUM SERPL-MCNC: 2.1 MG/DL — SIGNIFICANT CHANGE UP (ref 1.6–2.6)
MCHC RBC-ENTMCNC: 29.2 PG — SIGNIFICANT CHANGE UP (ref 27–34)
MCHC RBC-ENTMCNC: 34.1 GM/DL — SIGNIFICANT CHANGE UP (ref 32–36)
MCV RBC AUTO: 85.5 FL — SIGNIFICANT CHANGE UP (ref 80–100)
MONOCYTES # BLD AUTO: 0.78 K/UL — SIGNIFICANT CHANGE UP (ref 0–0.9)
MONOCYTES NFR BLD AUTO: 9.3 % — SIGNIFICANT CHANGE UP (ref 2–14)
NEUTROPHILS # BLD AUTO: 6.18 K/UL — SIGNIFICANT CHANGE UP (ref 1.8–7.4)
NEUTROPHILS NFR BLD AUTO: 73.7 % — SIGNIFICANT CHANGE UP (ref 43–77)
PHOSPHATE SERPL-MCNC: 3.7 MG/DL — SIGNIFICANT CHANGE UP (ref 2.4–4.7)
PLATELET # BLD AUTO: 212 K/UL — SIGNIFICANT CHANGE UP (ref 150–400)
POTASSIUM SERPL-MCNC: 4.3 MMOL/L — SIGNIFICANT CHANGE UP (ref 3.5–5.3)
POTASSIUM SERPL-SCNC: 4.3 MMOL/L — SIGNIFICANT CHANGE UP (ref 3.5–5.3)
PROT SERPL-MCNC: 6.7 G/DL — SIGNIFICANT CHANGE UP (ref 6.6–8.7)
RBC # BLD: 4.35 M/UL — SIGNIFICANT CHANGE UP (ref 4.2–5.8)
RBC # FLD: 14.2 % — SIGNIFICANT CHANGE UP (ref 10.3–14.5)
RSV RNA NPH QL NAA+NON-PROBE: SIGNIFICANT CHANGE UP
SARS-COV-2 RNA SPEC QL NAA+PROBE: SIGNIFICANT CHANGE UP
SODIUM SERPL-SCNC: 133 MMOL/L — LOW (ref 135–145)
TROPONIN T SERPL-MCNC: <0.01 NG/ML — SIGNIFICANT CHANGE UP (ref 0–0.06)
TROPONIN T SERPL-MCNC: <0.01 NG/ML — SIGNIFICANT CHANGE UP (ref 0–0.06)
WBC # BLD: 8.4 K/UL — SIGNIFICANT CHANGE UP (ref 3.8–10.5)
WBC # FLD AUTO: 8.4 K/UL — SIGNIFICANT CHANGE UP (ref 3.8–10.5)

## 2023-03-24 PROCEDURE — 71045 X-RAY EXAM CHEST 1 VIEW: CPT | Mod: 26

## 2023-03-24 PROCEDURE — 93010 ELECTROCARDIOGRAM REPORT: CPT

## 2023-03-24 PROCEDURE — 99285 EMERGENCY DEPT VISIT HI MDM: CPT | Mod: GC

## 2023-03-24 PROCEDURE — 99223 1ST HOSP IP/OBS HIGH 75: CPT

## 2023-03-24 RX ORDER — LITHIUM CARBONATE 300 MG/1
300 TABLET, EXTENDED RELEASE ORAL
Refills: 0 | Status: DISCONTINUED | OUTPATIENT
Start: 2023-03-24 | End: 2023-03-26

## 2023-03-24 RX ORDER — DIVALPROEX SODIUM 500 MG/1
2 TABLET, DELAYED RELEASE ORAL
Qty: 0 | Refills: 0 | DISCHARGE

## 2023-03-24 RX ORDER — CHOLECALCIFEROL (VITAMIN D3) 125 MCG
2000 CAPSULE ORAL DAILY
Refills: 0 | Status: DISCONTINUED | OUTPATIENT
Start: 2023-03-24 | End: 2023-03-26

## 2023-03-24 RX ORDER — CLONAZEPAM 1 MG
1 TABLET ORAL
Qty: 0 | Refills: 0 | DISCHARGE

## 2023-03-24 RX ORDER — CHOLECALCIFEROL (VITAMIN D3) 125 MCG
1 CAPSULE ORAL
Qty: 0 | Refills: 0 | DISCHARGE

## 2023-03-24 RX ORDER — TRAZODONE HCL 50 MG
100 TABLET ORAL AT BEDTIME
Refills: 0 | Status: DISCONTINUED | OUTPATIENT
Start: 2023-03-24 | End: 2023-03-26

## 2023-03-24 RX ORDER — TRAZODONE HCL 50 MG
1 TABLET ORAL
Qty: 0 | Refills: 0 | DISCHARGE

## 2023-03-24 RX ORDER — OLANZAPINE 15 MG/1
1 TABLET, FILM COATED ORAL
Qty: 0 | Refills: 0 | DISCHARGE

## 2023-03-24 RX ORDER — CETIRIZINE HYDROCHLORIDE 10 MG/1
1 TABLET ORAL
Qty: 0 | Refills: 0 | DISCHARGE

## 2023-03-24 RX ORDER — IBUPROFEN 200 MG
0 TABLET ORAL
Qty: 0 | Refills: 0 | DISCHARGE

## 2023-03-24 RX ORDER — HALOPERIDOL DECANOATE 100 MG/ML
100 INJECTION INTRAMUSCULAR
Qty: 0 | Refills: 0 | DISCHARGE

## 2023-03-24 RX ORDER — LITHIUM CARBONATE 300 MG/1
1 TABLET, EXTENDED RELEASE ORAL
Qty: 0 | Refills: 0 | DISCHARGE

## 2023-03-24 RX ORDER — HALOPERIDOL DECANOATE 100 MG/ML
2 INJECTION INTRAMUSCULAR
Qty: 0 | Refills: 0 | DISCHARGE

## 2023-03-24 RX ORDER — PANTOPRAZOLE SODIUM 20 MG/1
1 TABLET, DELAYED RELEASE ORAL
Qty: 0 | Refills: 0 | DISCHARGE

## 2023-03-24 RX ORDER — OMEPRAZOLE 10 MG/1
0 CAPSULE, DELAYED RELEASE ORAL
Qty: 0 | Refills: 0 | DISCHARGE

## 2023-03-24 RX ORDER — HEXAVITAMINS
1 TABLET ORAL
Qty: 0 | Refills: 0 | DISCHARGE

## 2023-03-24 RX ORDER — AMLODIPINE BESYLATE 2.5 MG/1
1 TABLET ORAL
Qty: 0 | Refills: 0 | DISCHARGE

## 2023-03-24 RX ORDER — SOD,AMMONIUM,POTASSIUM LACTATE
1 CREAM (GRAM) TOPICAL
Qty: 0 | Refills: 0 | DISCHARGE

## 2023-03-24 RX ORDER — TAMSULOSIN HYDROCHLORIDE 0.4 MG/1
1 CAPSULE ORAL
Qty: 0 | Refills: 0 | DISCHARGE

## 2023-03-24 RX ORDER — HEXAVITAMINS
300 TABLET ORAL DAILY
Refills: 0 | Status: DISCONTINUED | OUTPATIENT
Start: 2023-03-24 | End: 2023-03-26

## 2023-03-24 RX ORDER — FLUPHENAZINE HYDROCHLORIDE 1 MG/1
1 TABLET, FILM COATED ORAL
Qty: 0 | Refills: 0 | DISCHARGE

## 2023-03-24 RX ORDER — ASPIRIN/CALCIUM CARB/MAGNESIUM 324 MG
81 TABLET ORAL DAILY
Refills: 0 | Status: DISCONTINUED | OUTPATIENT
Start: 2023-03-24 | End: 2023-03-26

## 2023-03-24 RX ORDER — PYRIDOXINE HCL (VITAMIN B6) 100 MG
50 TABLET ORAL DAILY
Refills: 0 | Status: DISCONTINUED | OUTPATIENT
Start: 2023-03-24 | End: 2023-03-26

## 2023-03-24 RX ORDER — FLUPHENAZINE HYDROCHLORIDE 1 MG/1
5 TABLET, FILM COATED ORAL
Refills: 0 | Status: DISCONTINUED | OUTPATIENT
Start: 2023-03-24 | End: 2023-03-26

## 2023-03-24 RX ORDER — TAMSULOSIN HYDROCHLORIDE 0.4 MG/1
0.4 CAPSULE ORAL AT BEDTIME
Refills: 0 | Status: DISCONTINUED | OUTPATIENT
Start: 2023-03-24 | End: 2023-03-26

## 2023-03-24 RX ORDER — AMANTADINE HCL 100 MG
1 CAPSULE ORAL
Qty: 0 | Refills: 0 | DISCHARGE

## 2023-03-24 RX ORDER — FERROUS SULFATE 325(65) MG
1 TABLET ORAL
Qty: 0 | Refills: 0 | DISCHARGE

## 2023-03-24 RX ORDER — ASPIRIN/CALCIUM CARB/MAGNESIUM 324 MG
1 TABLET ORAL
Qty: 0 | Refills: 0 | DISCHARGE

## 2023-03-24 RX ORDER — PYRIDOXINE HCL (VITAMIN B6) 100 MG
1 TABLET ORAL
Qty: 0 | Refills: 0 | DISCHARGE

## 2023-03-24 RX ORDER — ENOXAPARIN SODIUM 100 MG/ML
40 INJECTION SUBCUTANEOUS EVERY 24 HOURS
Refills: 0 | Status: CANCELLED | OUTPATIENT
Start: 2023-03-24 | End: 2023-03-26

## 2023-03-24 RX ORDER — OMEPRAZOLE 10 MG/1
1 CAPSULE, DELAYED RELEASE ORAL
Qty: 0 | Refills: 0 | DISCHARGE

## 2023-03-24 RX ORDER — FERROUS SULFATE 325(65) MG
325 TABLET ORAL DAILY
Refills: 0 | Status: DISCONTINUED | OUTPATIENT
Start: 2023-03-24 | End: 2023-03-26

## 2023-03-24 RX ORDER — TOPIRAMATE 25 MG
3 TABLET ORAL
Qty: 0 | Refills: 0 | DISCHARGE

## 2023-03-24 RX ORDER — OLANZAPINE 15 MG/1
0 TABLET, FILM COATED ORAL
Qty: 0 | Refills: 0 | DISCHARGE

## 2023-03-24 RX ORDER — DIVALPROEX SODIUM 500 MG/1
0 TABLET, DELAYED RELEASE ORAL
Qty: 0 | Refills: 0 | DISCHARGE

## 2023-03-24 RX ORDER — AMANTADINE HCL 100 MG
100 CAPSULE ORAL
Refills: 0 | Status: DISCONTINUED | OUTPATIENT
Start: 2023-03-24 | End: 2023-03-26

## 2023-03-24 RX ADMIN — Medication 100 MILLIGRAM(S): at 21:09

## 2023-03-24 RX ADMIN — LITHIUM CARBONATE 300 MILLIGRAM(S): 300 TABLET, EXTENDED RELEASE ORAL at 18:57

## 2023-03-24 RX ADMIN — FLUPHENAZINE HYDROCHLORIDE 5 MILLIGRAM(S): 1 TABLET, FILM COATED ORAL at 21:08

## 2023-03-24 RX ADMIN — TAMSULOSIN HYDROCHLORIDE 0.4 MILLIGRAM(S): 0.4 CAPSULE ORAL at 21:09

## 2023-03-24 NOTE — H&P ADULT - HISTORY OF PRESENT ILLNESS
67 yom, PMH Bipolar 1, Schizophrenia, HTN, presents to ED via ambulance for lightheadedness. Pt. is a resident of [facility], representative of PPC with patient and provides collateral information. Per pt, he was began on Metoprolol 50 mg ER today for HTN control. Shortly after taking Metoprolol, he began to feel "lightheaded" and lowered himself to the ground. Pt. denies losing consciousness and remembers the entire event. Per EMS, pt was found to be hypotensive in a junctional bradycardia. Hypotension corrected with crystalloid fluid bolus. Pt reports intermittent substernal non-radiating 2/10 chest pain at time of incident that has subsequently resolved. Pt. reports he has had similar pain intermittently over the past several weeks, denies any provocation or palliating measures.  67 yom, PMH Bipolar 1, Schizoaffective/ Schizophrenia, Obese, GERD, leg edema, BPH, SIADH, Psychogenic polydipsia,  HTN, presents to ED via ambulance for lightheadedness. Pt. is a resident of [facility], representative of P[facility] with patient and provides collateral information. Per pt, he was began on Metoprolol 50 mg ER today for HTN control. Shortly after taking Metoprolol, he began to feel "lightheaded" and lowered himself to the ground. Pt. denies losing consciousness and remembers the entire event. Per EMS, pt was found to be hypotensive in a junctional bradycardia. Hypotension corrected with crystalloid fluid bolus at [facility]. Pt reports intermittent substernal non-radiating 2/10 chest pain at time of incident that has subsequently resolved. Pt. reports he has had similar pain intermittently over the past several weeks, denies any provocation or palliating measures.   Little while later he states he felt something in his head, "something not right and just a little bit, not a lot" during the episode.    Of note. Pt was seen by Dr Mensah [facility] in office yesterday. He stopped patients Amlodipine and started losartan 100mg. Unclear who and why BB was started today at [facility]. Also, per ED provider patient's cards is Dr Webster.

## 2023-03-24 NOTE — ED PROVIDER NOTE - PROGRESS NOTE DETAILS
BP stable but still in junctional rhythm on the monitor. Spoke to Dr. Fernández. Patient with 35% of midLAD on LHC from outside hospital. Lytes and trop WNL. Will admit for junctional bradycardia in the setting of Lopressor ER use. TTE ordered. Daughter Patrica and employee from [employer] made aware of admission. Génesis Whittington MD PGY-2

## 2023-03-24 NOTE — ED ADULT NURSE NOTE - OBJECTIVE STATEMENT
Assumed care of patient in critical care. Pt a&ox4 rr even and unlabored from [facility] presents to ed with c/o of dizziness, hypotension and bradycardia. Pt had recent medicine change of lopressor 50mg twice a day started on 3/24/23, last given 0802. Pt denies chest pain, sob, fever, chills, gu/gi symptoms, dizziness, numbness/tingling. pt educated on plan of care, pt able to successfully teach back plan of care to RN, RN will continue to reeducate pt during hospital stay. Assumed care of patient in critical care. Pt a&ox4 rr even and unlabored from [facility] presents to ed with c/o of dizziness, hypotension and bradycardia. Pt had recent medicine change of lopressor 50mg twice a day started on 3/24/23, last given 0802. Pt reports filling dizzy and lowering himself to the ground and sitting down. Pt denies chest pain, sob, fever, chills, gu/gi symptoms, dizziness, numbness/tingling. pt educated on plan of care, pt able to successfully teach back plan of care to RN, RN will continue to reeducate pt during hospital stay. Assumed care of patient in critical care. Pt a&ox4 rr even and unlabored with pmhx of schizophrenia, htn, gerd from [facility] presents to ed with c/o of dizziness, hypotension and bradycardia 50s. Pt had recent medicine change of lopressor 50mg twice a day started on 3/24/23, last given 0802. Pt reports filling dizzy and lowering himself to the ground and sitting down. Pt denies chest pain, sob, fever, chills, gu/gi symptoms, dizziness, numbness/tingling. pt educated on plan of care, pt able to successfully teach back plan of care to RN, RN will continue to reeducate pt during hospital stay.

## 2023-03-24 NOTE — ED PROVIDER NOTE - ATTENDING CONTRIBUTION TO CARE
PT from \[facility]\"". Pt was to be started on metoprolol this morning. PT took his first dose and shortly afterwards felt lightheaded and sat down on the floor. EMS was called and on arrival PT bradycardic to the 40s and hypotensive to 60 systolic. Pt given IVF And bp improved. PT co mild chest pain. no other complaints.    physical - poonam regular. ctab abd - soft, nt. no edema. no rash.    plan - labs and imaging reviewed. case d/w DR. Bergman who recommends admission as patient with junctional poonam following po metoprolol for monitoring. case d/w dr. segundo for admission.

## 2023-03-24 NOTE — ED ADULT NURSE NOTE - NSIMPLEMENTINTERV_GEN_ALL_ED
"This document was created with voice recognition software.  As result, there may be errors in grammar and syntax.  Please consider this when reading this document.    Psychiatric consult, under supervision of Dr. Galvin, ordered by Jayson Mary PA-C.  This was an initial consult, which took a total of 55 minutes, with half the time coordinating care.    Summary of consult  Please see the H&P by Jayson Mary PA-C, for detailed information about why Josafat Matson was admitted.  For the purposes of this report, it should be noted that he is a 77-year-old  male with PMH significant for Parkinson's disease, Lewy body dementia, cardiac disease, diabetes, sleep apnea and hyperlipidemia.  He was brought to the [facility] ED due to worsening visual hallucinations, insomnia, behavioral dyscontrol, and a fall.  He was admitted as a voluntary patient for observation.  I was asked to see him to help assess psychiatric medications, and consultation with Dr. Galvin, and to provide input into discharge planning.    Consultation with other team members  I conferred remotely with Dr. Galvin, who recommended:   1.  Continue Seroquel, 25 mg, nightly.  She noted that this is a low dose and she would be concerned about about possible risk for a cardiac concerns only if the dose is increased.    Her recommendations were discussed with Dr. Carter.    -----------------------------------------------------------------------------------------    Records reviewed   H&P done by Jayson Mary PA-C , including review of systems,  as well as chart review including  previous admissions, Care Everywhere records and  the pharmacy admission note.     Progress notes/consults  The most recent nursing progress note documents, \"oriented to self only. No hallucinations or behaviors present.\"   I conferred with CHARBEL Bess, who had just met with the patient, wife, and son.  She reports that they do not have the financial resources for " "out-of-home placement and are struggling to provide care in the son's home.  The plan is to help them access potential financial resources, if possible.    Previous treatment records  I reviewed the last 2 outpatient neurology progress notes.    Collateral Information  Kashmir's wife, Mauri, and son, Rafa, were visiting and provided very helpful information.  There clearly highly concerned about him and also highly supportive.  Mauri readily acknowledged that she is run down due to the demands of caring for her .  In particular, his sleep problems leave her sleep deprived.  This is been particularly he troublesome the last 2 days, when he reportedly has slept very little, been quite active, and she has had to attend to him for much of the night.  Her primary concern, she reports, is her hope that he will be able to try a new medication that will help with sleep and also hallucinations.  Rafa supported these concerns.    He also reported that he usually pulls his CPAP machine mask off multiple times during the night.  They attribute this to confusion and agitation.    Finally, they also mentioned that he had a fall at the [facility] a few months ago, and they are trying to keep him physically active but recently he has been unable to tolerate activity he outside of the home.    Patient Report of Admission Events/Circumstances  Josafat, who prefers to go by Kashmir , was visiting with his wife, Mauri, and son, Rafa, when I introduced myself. Kashmir was able to provide only limited information, but was able to tell me that he understands that he is in a hospital and he is here because \"I had a fall, and I have memory problems.\"     Mental and Chemical Health Treatment History    Kashmir is monitored by a neurologist at a Parkinson's clinic.  The family is very comfortable with the services that he is receiving at the clinic.  In fact, Mauri had scheduled an appointment at the clinic yesterday to discuss her concerns about his " "sleep and hallucinations, was getting him ready to leave and he fell and she was unable to get him up.    Social Background  Dee are currently living with Rafa due to financial problems, reportedly including impending bankruptcy.  They are very appreciative of the consultation with the [facility] .    Behavioral Assessment  Kashmir was dozing in a chair in his room when I introduced myself, and initially did not appear to be aware of my presence.  As I chatted with his family, however, he oriented himself and eventually joined the discussion, although at times his comments were non sequiturs.  He was, however, persistently very pleasant.  Appearance was notable for thin build, and otherwise was typical for age and medical status.  Today, he was oriented to person, place in general terms (he understands he is in a hospital, but thought it was \"a hospital in [place]\"), but was able to accurately tell me the year and the month.  He was off a few days on the day of the month, and was unable to guess the day of the week.  Eye contact was variable, but at times he clearly was engaged in the discussion and made eye contact.  Attention and concentration were also variable.  Several times during the interview he abruptly jerked.  His wife attributed this to likely Lewy body symptoms.  Muscle strength/tone, gait and station are deferred to the hospitalist team.     Kashmir's speech was soft, variably fluent fluent, logical and goal-directed.  At times his comments were non sequiturs.  At other times, he was able to track the conversation and make generally appropriate comments.  At no time was his speech pressured.  Memory he also is variable. IQ and fund of knowledge are cautiously estimated to be in the Significantly Impaired range.     There were no obvious indications of depression or anxiety.  He has no history of bipolar behavioral patterns.  Mauri reports that yesterday his hallucinations were worse, and " he had an extended discussion with the hallucination of a friend.  Today, she reports that there is been times when he reached for something that was not there and also appeared to be looking around as though he was seeing things that are not there.  At one point during the interview he reached for something on his tray, but there was nothing there.    Insight/judgement are severely impaired.    Risk Assessment  There are no indications of current suicidal risk.       Impressions  Strengths:  Kashmir appears to be a temperamentally pleasant person who has tremendous support from his family.  Liabilities: He suffers from a progressive neurodegenerative set of diseases.    Diagnoses   Dementia, secondary to neurodegenerative diseases; complex medical problems, per hospitalist team.    Recommendations   1.  Dr. Galvin's medication recommendation was discussed with Dr. Carter.  2.  When medically cleared, discharge to community resources with the recommendation that the family follow up on the social work information about accessing resources for placement in an appropriate care setting.    Tha [email or web address].[email or web address]., L.P.  [phone] (cell)  [phone] (office)           Implemented All Universal Safety Interventions:  [place] to call system. Call bell, personal items and telephone within reach. Instruct patient to call for assistance. Room bathroom lighting operational. Non-slip footwear when patient is off stretcher. Physically safe environment: no spills, clutter or unnecessary equipment. Stretcher in lowest position, wheels locked, appropriate side rails in place.

## 2023-03-24 NOTE — ED ADULT TRIAGE NOTE - CHIEF COMPLAINT QUOTE
sent from [place] for dizziness, recent medication change to metoprolol, became bradycardic 40s and hypotensive 60s systolic. IV initiated and appx 700mL NS infused in field

## 2023-03-24 NOTE — H&P ADULT - ASSESSMENT
67 yom, PMH Bipolar 1, Schizophrenia, HTN, presents to ED via ambulance for lightheadedness. Pt. is a resident of [facility], representative of [facility] with patient and provides collateral information. Per pt, he was began on Metoprolol 50 mg ER today for HTN control. Shortly after taking Metoprolol, he began to feel "lightheaded" and lowered himself to the ground. Pt. denies losing consciousness and remembers the entire event. Per EMS, pt was found to be hypotensive in a junctional bradycardia. Hypotension corrected with crystalloid fluid bolus. Pt reports intermittent substernal non-radiating 2/10 chest pain at time of incident that has subsequently resolved. Pt. reports he has had similar pain intermittently over the past several weeks, denies any provocation or palliating measures. Throughout ED course, pt. is noted to remain in junctional bradycardia despite hemodynamic stability. Pt. reports resolution of all symptoms.   On bloodwork, pt noted to be slightly anemic, slightly hyponatremic, and with elevated AST. CXR reveals no acute pathology.     1. Junctional Bradycardia  Likely secondary to Metoprolol ER administration.  ·	Cardiology consult performed by ED, oral recommendations given as follows:  ·	Decline catheterization at this time (cath within year shows LAD occlusion of approx. 35%)  ·	TTE   ·	Tele monitoring until rhythm normalizes (likely following excretion of metoprolol)    2. Normocytic Anemia  Likely secondary to iron deficiency, although adverse medication effects should be considered. Low clinical concern for bleeding at this time.   ·	CBC daily to trend H/H  ·	Iron repletion if further decrease    3. Hyponatremia  Likely physiologic variant in absence of other electrolyte abnormalities.  ·	CMP daily to trend hyponatremia  ·	Replete if hyponatremia worsens    4. Elevated LFT  Likely secondary to psychotropic medication  ·	CMP daily to trend LFTs  ·	Consult hepatology if LFTs worsen    5. Bipolar 1/Schizophrenia  ·	Continue outpatient management for duration of inpatient stay    DVT prophylaxis: lovenox    In light of junctional rhythm with episode of hypotension, patient requires continued inpatient monitoring. Will continue to follow cardiology recs and monitor,        67 yom, PMH Bipolar 1, Schizoaffective/ Schizophrenia, Obese, GERD, leg edema, BPH, SIADH, Psychogenic polydipsia, HTN, latent TB, presents to ED via ambulance for lightheadedness. Pt. is a resident of [facility], representative of [facility] with patient and provides collateral information. Per pt, he was began on Metoprolol 50 mg ER today for HTN control. Shortly after taking Metoprolol, he began to feel "lightheaded" and lowered himself to the ground. Pt. denies losing consciousness and remembers the entire event. Per EMS, pt was found to be hypotensive in a junctional bradycardia. Hypotension corrected with crystalloid fluid bolus. Pt reports intermittent substernal non-radiating 2/10 chest pain at time of incident that has subsequently resolved. Pt. reports he has had similar pain intermittently over the past several weeks, denies any provocation or palliating measures. Throughout ED course, pt. is noted to remain in junctional bradycardia despite hemodynamic stability. Pt. reports resolution of all symptoms.     1. Symptomatic Junctional Bradycardia. Currently in 50s HR  Likely secondary to Metoprolol ER administration.  ·	Cardiology consult called by ED, oral recommendations given as follows:  ·	Decline catheterization at this time (cath within past year shows LAD occlusion of approx. 35%)  ·	TTE   ·	Tele monitoring until rhythm normalizes (likely following excretion of metoprolol in 72 hrs)  ·	Lithium level nl    2. Hyponatremia, Mild  Likely sec to psych meds vs psychogenic polydipsia   ·	Trend Na  ·	Free water restriction    3. HTN controlled  Of note. Pt was seen by Dr Mensah [facility] in office yesterday. He stopped patients Amlodipine (sec to leg edema) and started losartan 100mg. Unclear who and why BB was started today at [place]. Also, per ED provider patient's cards is Dr Webster.   Cont losartan    4. Bipolar 1/Schizophrenia  ·	Continue Trazadone, Lithium, Amantadine    5. Latent TB  On INH and Pyridoxine    6. BPH  Cont Flomax    DVT prophylaxis: Lovenox

## 2023-03-24 NOTE — ED PROVIDER NOTE - PHYSICAL EXAMINATION
Gen: no acute distress  Head: normocephalic, atraumatic  EENT: EOMI  Lung: no increased work of breathing, CTABL  CV: normal s1/s2, 2+ radial pulses b/l, no LE edema  Abd: soft, non-tender, non-distended, no rebound tenderness or guarding  MSK: no visible deformities, full range of motion in all 4 extremities  Neuro: A&Ox4; No focal neurologic deficits

## 2023-03-24 NOTE — ED PROVIDER NOTE - CLINICAL SUMMARY MEDICAL DECISION MAKING FREE TEXT BOX
67-year-old male with PMH schizophrenia, HTN, GERD presenting with lightheadedness. 67-year-old male with PMH schizophrenia, HTN, GERD presenting with lightheadedness and bradycardia in the setting of new beta-blocker use. Blood pressure improved and now HR in the 50s. He is mentating well and without complaints. BP stable but still in junctional rhythm on the monitor. Spoke to Dr. Fernández. Patient with 35% of midLAD on LHC from outside hospital. Lytes and trop WNL. Will admit for junctional bradycardia in the setting of Lopressor ER use. TTE ordered. Daughter Patrica and employee from [employer] made aware of admission.

## 2023-03-24 NOTE — H&P ADULT - NSHPOUTPATIENTPROVIDERS_GEN_ALL_CORE
[facility] - Inpatient psychiatric provider [facility] - Inpatient psychiatric provider  Israel Benjamin- PMD/ NP

## 2023-03-24 NOTE — ED PROVIDER NOTE - OBJECTIVE STATEMENT
67-year-old male with PMH schizophrenia, HTN, GERD presenting with lightheadedness.  Per [employer] employee patient became dizzy and lightheaded after taking his morning dose of metoprolol.  He then lowered himself to the ground because of the lightheadedness.  When EMS arrived he is noted to be bradycardic and started on a liter of crystalloid.  On arrival to ED patient bradycardic to low 50s and taken to critical.  He is currently mentating well.  He is currently complaining of mild midsternal chest pain that he has had Intermittently for the past few weeks. Patient without other complaints at this time. 67-year-old male with PMH schizophrenia, HTN, GERD presenting with lightheadedness.  Per [facility] employee patient became dizzy and lightheaded after taking his morning dose of 50mg metoprolol ER. Per record from [facility] he was started on the metoprolol today for HTN. He then lowered himself to the ground because of the lightheadedness.  When EMS arrived he is noted to be bradycardic and hypotensive to 60s SBP so he was started on a liter of crystalloid which improved his BP.  On arrival to ED patient bradycardic to low 50s and taken to critical.  He is currently mentating well.  He is currently complaining of mild midsternal chest pain that he has had Intermittently for the past few weeks. Patient without other complaints at this time.

## 2023-03-24 NOTE — ED ADULT NURSE NOTE - CHIEF COMPLAINT QUOTE
sent from [place] for dizziness, recent medication change to metoprolol, became bradycardic 40s and hypotensive 60s systolic. IV initiated and appx 700mL NS infused in field

## 2023-03-24 NOTE — H&P ADULT - ATTENDING COMMENTS
I have personally seen and examined this patient.  I have fully participated in the care of this patient. I have reviewed all pertinent clinical information, including history, physical exam, plan and the student's note and agree except as noted.    Pt was given metoprolol ER 50mg at [facility] this AM for BP control. He had an episode of near syncope with nausea, diaphoresis, light headed and he lowered himself to the floor. per [facility] records he was both bradycardic in 40s and BP was 60/40 mmHg. 1 L IVF bolus given and send to ED.    On arrival here., BP normal, HR 40 bpm. EKG showed junctional rhythm  other symptoms have resolved    ED d/w Cards.    Monitor on Tele  await metoprolol wash out of system in 72 hrs  prior known obstructive CAD- per cards    Rest as above

## 2023-03-24 NOTE — H&P ADULT - NSICDXPASTMEDICALHX_GEN_ALL_CORE_FT
PAST MEDICAL HISTORY:  Bipolar 1 disorder     Hypertension     Other schizophrenia     Poor historian   bipolar disorder  Schizophrenia   GERD  Chronic hyponatremia   HTN

## 2023-03-25 LAB
ANION GAP SERPL CALC-SCNC: 8 MMOL/L — SIGNIFICANT CHANGE UP (ref 5–17)
BUN SERPL-MCNC: 20.5 MG/DL — HIGH (ref 8–20)
CALCIUM SERPL-MCNC: 8.8 MG/DL — SIGNIFICANT CHANGE UP (ref 8.4–10.5)
CHLORIDE SERPL-SCNC: 104 MMOL/L — SIGNIFICANT CHANGE UP (ref 96–108)
CO2 SERPL-SCNC: 23 MMOL/L — SIGNIFICANT CHANGE UP (ref 22–29)
CREAT SERPL-MCNC: 1.02 MG/DL — SIGNIFICANT CHANGE UP (ref 0.5–1.3)
EGFR: 81 ML/MIN/1.73M2 — SIGNIFICANT CHANGE UP
GLUCOSE SERPL-MCNC: 89 MG/DL — SIGNIFICANT CHANGE UP (ref 70–99)
HCT VFR BLD CALC: 37.2 % — LOW (ref 39–50)
HGB BLD-MCNC: 12.3 G/DL — LOW (ref 13–17)
MAGNESIUM SERPL-MCNC: 2.1 MG/DL — SIGNIFICANT CHANGE UP (ref 1.6–2.6)
MCHC RBC-ENTMCNC: 28.7 PG — SIGNIFICANT CHANGE UP (ref 27–34)
MCHC RBC-ENTMCNC: 33.1 GM/DL — SIGNIFICANT CHANGE UP (ref 32–36)
MCV RBC AUTO: 86.7 FL — SIGNIFICANT CHANGE UP (ref 80–100)
PLATELET # BLD AUTO: 220 K/UL — SIGNIFICANT CHANGE UP (ref 150–400)
POTASSIUM SERPL-MCNC: 4.2 MMOL/L — SIGNIFICANT CHANGE UP (ref 3.5–5.3)
POTASSIUM SERPL-SCNC: 4.2 MMOL/L — SIGNIFICANT CHANGE UP (ref 3.5–5.3)
RBC # BLD: 4.29 M/UL — SIGNIFICANT CHANGE UP (ref 4.2–5.8)
RBC # FLD: 14.3 % — SIGNIFICANT CHANGE UP (ref 10.3–14.5)
SODIUM SERPL-SCNC: 135 MMOL/L — SIGNIFICANT CHANGE UP (ref 135–145)
WBC # BLD: 9.49 K/UL — SIGNIFICANT CHANGE UP (ref 3.8–10.5)
WBC # FLD AUTO: 9.49 K/UL — SIGNIFICANT CHANGE UP (ref 3.8–10.5)

## 2023-03-25 PROCEDURE — 99232 SBSQ HOSP IP/OBS MODERATE 35: CPT

## 2023-03-25 RX ADMIN — Medication 100 MILLIGRAM(S): at 23:48

## 2023-03-25 RX ADMIN — Medication 100 MILLIGRAM(S): at 05:16

## 2023-03-25 RX ADMIN — Medication 100 MILLIGRAM(S): at 17:00

## 2023-03-25 RX ADMIN — TAMSULOSIN HYDROCHLORIDE 0.4 MILLIGRAM(S): 0.4 CAPSULE ORAL at 23:47

## 2023-03-25 RX ADMIN — Medication 2000 UNIT(S): at 12:06

## 2023-03-25 RX ADMIN — Medication 300 MILLIGRAM(S): at 12:06

## 2023-03-25 RX ADMIN — Medication 50 MILLIGRAM(S): at 12:06

## 2023-03-25 RX ADMIN — LITHIUM CARBONATE 300 MILLIGRAM(S): 300 TABLET, EXTENDED RELEASE ORAL at 05:16

## 2023-03-25 RX ADMIN — LITHIUM CARBONATE 300 MILLIGRAM(S): 300 TABLET, EXTENDED RELEASE ORAL at 17:00

## 2023-03-25 RX ADMIN — Medication 325 MILLIGRAM(S): at 12:06

## 2023-03-25 RX ADMIN — Medication 81 MILLIGRAM(S): at 12:06

## 2023-03-25 RX ADMIN — FLUPHENAZINE HYDROCHLORIDE 5 MILLIGRAM(S): 1 TABLET, FILM COATED ORAL at 05:16

## 2023-03-25 RX ADMIN — FLUPHENAZINE HYDROCHLORIDE 5 MILLIGRAM(S): 1 TABLET, FILM COATED ORAL at 17:00

## 2023-03-25 NOTE — CONSULT NOTE ADULT - ASSESSMENT
66 y/o male with h/o schizophrenia/bipolar discorder, GERD and chronic hyponatremia. He has CAD with last cath (04/2021) 30% mLAD with mild superficial bridging. Normal LV function. He is a [place] Resident. He was BIBEMS with lightheadedness. He was recently started on Metoprolol 50 mg ER today for HTN control. Shortly after taking Metoprolol, he began to feel "lightheaded" and lowered himself to the ground. Pt. denies losing consciousness and remembers the entire event. EMS noted patient to  hypotensive in a junctional bradycardia. Hypotension corrected with crystalloid fluid bolus. Pt reports intermittent substernal non-radiating 2/10 chest pain at time of incident that has subsequently resolved. In ED, pt. had transient junctional bradycardia despite hemodynamic stability.   1. Near syncope in setting of Bradycardia, likely secondary to metoprolol in presence of underlying conduction system disease  2. Chest pain, no evidence of ACS    Plan:  Telemetry  Check lithium levels.   Echocardiogram.   Repeat EKG  EP Consult - Dr. Gonzalez in AM
 - near syncope secondary to metoprolol-induced junctional bradycardia. Resolved. Echo shows normal cardiac structure and fucntion    Recommendations  Ok to discharge from hospital  Follow up in 2 weeks.

## 2023-03-25 NOTE — CONSULT NOTE ADULT - SUBJECTIVE AND OBJECTIVE BOX
Patient is a 67y old  Male who presents with a chief complaint of symptomatic Bradycardia (24 Mar 2023 18:08)      HPI:  67 yom, PMH Bipolar 1, Schizoaffective/ Schizophrenia, Obese, GERD, leg edema, BPH, SIADH, Psychogenic polydipsia,  HTN, presents to ED via ambulance for lightheadedness. Pt. is a resident of [facility], representative of [facility] with patient and provides collateral information. Per pt, he was began on Metoprolol 50 mg ER today for HTN control. Shortly after taking Metoprolol, he began to feel "lightheaded" and lowered himself to the ground. Pt. denies losing consciousness and remembers the entire event. Per EMS, pt was found to be hypotensive in a junctional bradycardia. Hypotension corrected with crystalloid fluid bolus at [place]. Pt reports intermittent substernal non-radiating 2/10 chest pain at time of incident that has subsequently resolved. Pt. reports he has had similar pain intermittently over the past several weeks, denies any provocation or palliating measures.   Little while later he states he felt something in his head, "something not right and just a little bit, not a lot" during the episode.    Of note. Pt was seen by Dr Mensah [facility] in office yesterday. He stopped patients Amlodipine and started losartan 100mg. Unclear who and why BB was started today at [place]. Also, per ED provider patient's cards is Dr Webster.  (24 Mar 2023 14:49)      PAST MEDICAL & SURGICAL HISTORY:  Other schizophrenia      Bipolar 1 disorder      Poor historian    bipolar disorder  Schizophrenia   GERD  Chronic hyponatremia   HTN      Hypertension        Allergies    No Known Allergies    Intolerances        MEDICATIONS  (STANDING):  amantadine 100 milliGRAM(s) Oral two times a day  aspirin enteric coated 81 milliGRAM(s) Oral daily  cholecalciferol 2000 Unit(s) Oral daily  ferrous    sulfate 325 milliGRAM(s) Oral daily  fluPHENAZine 5 milliGRAM(s) Oral two times a day  isoniazid 300 milliGRAM(s) Oral daily  lithium 300 milliGRAM(s) Oral two times a day  pyridoxine 50 milliGRAM(s) Oral daily  tamsulosin 0.4 milliGRAM(s) Oral at bedtime  traZODone 100 milliGRAM(s) Oral at bedtime    MEDICATIONS  (PRN):      FAMILY HISTORY:  Family history of diabetes mellitus (DM)        SOCIAL HISTORY:    CIGARETTES: denies    ALCOHOL: denies    REVIEW OF SYSTEMS:  CONSTITUTIONAL: No fever, weight loss, or fatigue  EYES: No eye pain, visual disturbances, or discharge  ENMT:  No difficulty hearing, tinnitus, vertigo; No sinus or throat pain  NECK: No pain or stiffness  RESPIRATORY: No cough, wheezing, chills or hemoptysis; No Shortness of Breath  CARDIOVASCULAR: as in HPI  GASTROINTESTINAL: No abdominal or epigastric pain. No nausea, vomiting, or hematemesis; No diarrhea or constipation. No melena or hematochezia.  GENITOURINARY: No dysuria, frequency, hematuria, or incontinence  NEUROLOGICAL: No headaches, memory loss, loss of strength, numbness, or tremors  SKIN: No itching, burning, rashes, or lesions   LYMPH Nodes: No enlarged glands  ENDOCRINE: No heat or cold intolerance; No hair loss  MUSCULOSKELETAL: No joint pain or swelling; No muscle, back, or extremity pain  HEME/LYMPH: No easy bruising, or bleeding gums  ALLERY AND IMMUNOLOGIC: No hives or eczema	    Vital Signs Last 24 Hrs  T(C): 36.8 (25 Mar 2023 17:20), Max: 36.8 (25 Mar 2023 17:20)  T(F): 98.2 (25 Mar 2023 17:20), Max: 98.2 (25 Mar 2023 17:20)  HR: 68 (25 Mar 2023 17:20) (50 - 68)  BP: 137/67 (25 Mar 2023 17:20) (113/62 - 161/72)  RR: 18 (25 Mar 2023 17:20) (18 - 18)  SpO2: 98% (25 Mar 2023 17:20) (97% - 98%)    Parameters below as of 25 Mar 2023 17:20  Patient On (Oxygen Delivery Method): room air        Daily     Daily     I&O's Detail    25 Mar 2023 07:01  -  25 Mar 2023 17:30  --------------------------------------------------------  IN:    IV PiggyBack: 550 mL  Total IN: 550 mL    OUT:  Total OUT: 0 mL    Total NET: 550 mL          PHYSICAL EXAM:  Appearance: Normal, well nourished	  HEENT:   Normal oral mucosa, PERRL, EOMI, sclera non-icteric	  Lymphatic: No cervical lymphadenopathy  Cardiovascular: Normal S1 S2, No JVD, No cardiac murmurs, No carotid bruits, No peripheral edema  Respiratory: Lungs clear to auscultation	  Psychiatry: A & O x 3, Mood & affect appropriate  Gastrointestinal:  Soft, Non-tender, + BS, no bruits	  Skin: No rashes, No ecchymoses, No cyanosis  Neurologic: Grossly non-focal with full strength in all four extremities  Extremities: Normal range of motion, No clubbing, cyanosis or edema  Vascular: Peripheral pulses palpable 2+ bilaterally      INTERPRETATION OF TELEMETRY: SR    ECG: junctional bradycrdia    LABS:                        12.3   9.49  )-----------( 220      ( 25 Mar 2023 04:51 )             37.2     03-25    135  |  104  |  20.5<H>  ----------------------------<  89  4.2   |  23.0  |  1.02    Ca    8.8      25 Mar 2023 04:51  Phos  3.7     03-24  Mg     2.1     03-25    TPro  6.7  /  Alb  3.9  /  TBili  0.4  /  DBili  x   /  AST  65<H>  /  ALT  31  /  AlkPhos  69  03-24    CARDIAC MARKERS ( 24 Mar 2023 14:40 )  x     / <0.01 ng/mL / x     / x     / x      CARDIAC MARKERS ( 24 Mar 2023 11:32 )  x     / <0.01 ng/mL / x     / x     / x              I&O's Summary    25 Mar 2023 07:01  -  25 Mar 2023 17:30  --------------------------------------------------------  IN: 550 mL / OUT: 0 mL / NET: 550 mL      BNP  RADIOLOGY & ADDITIONAL STUDIES:
  CANDICE LESTER  [id number]  67yMale    HPI: 66 y/o male with h/o schizophrenia/bipolar discorder, GERD and chronic hyponatremia. He has CAD with last cath (04/2021) 30% mLAD with mild superficial bridging. Normal LV function. He is a [place] Resident. He was BIBEMS with lightheadedness. He was recently started on Metoprolol 50 mg ER today for HTN control. Shortly after taking Metoprolol, he began to feel "lightheaded" and lowered himself to the ground. Pt. denies losing consciousness and remembers the entire event. EMS noted patient to  hypotensive in a junctional bradycardia. Hypotension corrected with crystalloid fluid bolus. Pt reports intermittent substernal non-radiating 2/10 chest pain at time of incident that has subsequently resolved. In ED, pt. had transient junctional bradycardia despite hemodynamic stability.           REVIEW OF SYSTEMS:  NECK: No pain or stiffness  RESPIRATORY: No cough, wheezing, chills or hemoptysis; No Shortness of Breath  CARDIOVASCULAR: see hpi  GASTROINTESTINAL: No abdominal or epigastric pain. No nausea, vomiting, or hematemesis; No diarrhea or constipation. No melena or hematochezia.  NEUROLOGICAL: No headaches, memory loss, loss of strength, numbness, or tremors  MUSCULOSKELETAL: No joint pain or swelling; No muscle, back, or extremity pain  	      PAST MEDICAL & SURGICAL HISTORY:  Other schizophrenia      Bipolar 1 disorder      Poor historian    bipolar disorder  Schizophrenia   GERD  Chronic hyponatremia   HTN      Hypertension          Allergies    No Known Allergies    Intolerances        MEDICATIONS  (STANDING):  amantadine 100 milliGRAM(s) Oral two times a day  aspirin enteric coated 81 milliGRAM(s) Oral daily  cholecalciferol 2000 Unit(s) Oral daily  ferrous    sulfate 325 milliGRAM(s) Oral daily  fluPHENAZine 5 milliGRAM(s) Oral two times a day  isoniazid 300 milliGRAM(s) Oral daily  lithium 300 milliGRAM(s) Oral two times a day  pyridoxine 50 milliGRAM(s) Oral daily  tamsulosin 0.4 milliGRAM(s) Oral at bedtime  traZODone 100 milliGRAM(s) Oral at bedtime    MEDICATIONS  (PRN):      FAMILY HISTORY:  Family history of diabetes mellitus (DM)        SOCIAL HISTORY:      EKG:          Vital Signs Last 24 Hrs  T(C): 36.7 (24 Mar 2023 13:00), Max: 36.7 (24 Mar 2023 10:44)  T(F): 98.1 (24 Mar 2023 13:00), Max: 98.1 (24 Mar 2023 12:00)  HR: 48 (24 Mar 2023 14:30) (40 - 52)  BP: 106/61 (24 Mar 2023 14:30) (106/61 - 128/66)  BP(mean): --  RR: 18 (24 Mar 2023 14:30) (18 - 20)  SpO2: 98% (24 Mar 2023 14:30) (97% - 100%)    Parameters below as of 24 Mar 2023 14:30  Patient On (Oxygen Delivery Method): room air        Daily Height in cm: 172.72 (24 Mar 2023 10:44)    Daily     I&O's Detail      PHYSICAL EXAM:  Appearance: NAD  HEENT:   Normal oral mucosa, PERRL, sclera non-icteric	  Cardiovascular: Normal S1 S2, No JVD,   Respiratory: Lungs clear to auscultation	  Gastrointestinal:  Soft, Non-tender, + BS, no bruits	  Neurologic: Grossly non-focal with full strength in all four extremities  Extremities: No edema       LABS:                        12.7   8.40  )-----------( 212      ( 24 Mar 2023 11:32 )             37.2     03-24    133<L>  |  98  |  18.1  ----------------------------<  106<H>  4.3   |  27.0  |  1.13    Ca    9.0      24 Mar 2023 11:32  Phos  3.7     03-24  Mg     2.1     03-24    TPro  6.7  /  Alb  3.9  /  TBili  0.4  /  DBili  x   /  AST  65<H>  /  ALT  31  /  AlkPhos  69  03-24    CARDIAC MARKERS ( 24 Mar 2023 14:40 )  x     / <0.01 ng/mL / x     / x     / x      CARDIAC MARKERS ( 24 Mar 2023 11:32 )  x     / <0.01 ng/mL / x     / x     / x          EKG (03/24/2023) Junctional bradycardia with retrograde p waves

## 2023-03-26 ENCOUNTER — TRANSCRIPTION ENCOUNTER (OUTPATIENT)
Age: 68
End: 2023-03-26

## 2023-03-26 VITALS
SYSTOLIC BLOOD PRESSURE: 127 MMHG | DIASTOLIC BLOOD PRESSURE: 70 MMHG | HEART RATE: 63 BPM | TEMPERATURE: 98 F | RESPIRATION RATE: 17 BRPM | OXYGEN SATURATION: 97 %

## 2023-03-26 LAB
ANION GAP SERPL CALC-SCNC: 8 MMOL/L — SIGNIFICANT CHANGE UP (ref 5–17)
BASOPHILS # BLD AUTO: 0.07 K/UL — SIGNIFICANT CHANGE UP (ref 0–0.2)
BASOPHILS NFR BLD AUTO: 0.8 % — SIGNIFICANT CHANGE UP (ref 0–2)
BUN SERPL-MCNC: 15.4 MG/DL — SIGNIFICANT CHANGE UP (ref 8–20)
CALCIUM SERPL-MCNC: 8.5 MG/DL — SIGNIFICANT CHANGE UP (ref 8.4–10.5)
CHLORIDE SERPL-SCNC: 104 MMOL/L — SIGNIFICANT CHANGE UP (ref 96–108)
CO2 SERPL-SCNC: 24 MMOL/L — SIGNIFICANT CHANGE UP (ref 22–29)
CREAT SERPL-MCNC: 0.99 MG/DL — SIGNIFICANT CHANGE UP (ref 0.5–1.3)
EGFR: 83 ML/MIN/1.73M2 — SIGNIFICANT CHANGE UP
EOSINOPHIL # BLD AUTO: 0.26 K/UL — SIGNIFICANT CHANGE UP (ref 0–0.5)
EOSINOPHIL NFR BLD AUTO: 3.1 % — SIGNIFICANT CHANGE UP (ref 0–6)
GLUCOSE SERPL-MCNC: 97 MG/DL — SIGNIFICANT CHANGE UP (ref 70–99)
HCT VFR BLD CALC: 38.2 % — LOW (ref 39–50)
HGB BLD-MCNC: 12.7 G/DL — LOW (ref 13–17)
IMM GRANULOCYTES NFR BLD AUTO: 0.2 % — SIGNIFICANT CHANGE UP (ref 0–0.9)
LYMPHOCYTES # BLD AUTO: 2.2 K/UL — SIGNIFICANT CHANGE UP (ref 1–3.3)
LYMPHOCYTES # BLD AUTO: 26.3 % — SIGNIFICANT CHANGE UP (ref 13–44)
MAGNESIUM SERPL-MCNC: 2.2 MG/DL — SIGNIFICANT CHANGE UP (ref 1.8–2.6)
MCHC RBC-ENTMCNC: 28.8 PG — SIGNIFICANT CHANGE UP (ref 27–34)
MCHC RBC-ENTMCNC: 33.2 GM/DL — SIGNIFICANT CHANGE UP (ref 32–36)
MCV RBC AUTO: 86.6 FL — SIGNIFICANT CHANGE UP (ref 80–100)
MONOCYTES # BLD AUTO: 0.82 K/UL — SIGNIFICANT CHANGE UP (ref 0–0.9)
MONOCYTES NFR BLD AUTO: 9.8 % — SIGNIFICANT CHANGE UP (ref 2–14)
NEUTROPHILS # BLD AUTO: 5 K/UL — SIGNIFICANT CHANGE UP (ref 1.8–7.4)
NEUTROPHILS NFR BLD AUTO: 59.8 % — SIGNIFICANT CHANGE UP (ref 43–77)
PHOSPHATE SERPL-MCNC: 2.6 MG/DL — SIGNIFICANT CHANGE UP (ref 2.4–4.7)
PLATELET # BLD AUTO: 167 K/UL — SIGNIFICANT CHANGE UP (ref 150–400)
POTASSIUM SERPL-MCNC: 4.4 MMOL/L — SIGNIFICANT CHANGE UP (ref 3.5–5.3)
POTASSIUM SERPL-SCNC: 4.4 MMOL/L — SIGNIFICANT CHANGE UP (ref 3.5–5.3)
RBC # BLD: 4.41 M/UL — SIGNIFICANT CHANGE UP (ref 4.2–5.8)
RBC # FLD: 14.1 % — SIGNIFICANT CHANGE UP (ref 10.3–14.5)
SODIUM SERPL-SCNC: 136 MMOL/L — SIGNIFICANT CHANGE UP (ref 135–145)
WBC # BLD: 8.37 K/UL — SIGNIFICANT CHANGE UP (ref 3.8–10.5)
WBC # FLD AUTO: 8.37 K/UL — SIGNIFICANT CHANGE UP (ref 3.8–10.5)

## 2023-03-26 PROCEDURE — 36415 COLL VENOUS BLD VENIPUNCTURE: CPT

## 2023-03-26 PROCEDURE — 82962 GLUCOSE BLOOD TEST: CPT

## 2023-03-26 PROCEDURE — 84484 ASSAY OF TROPONIN QUANT: CPT

## 2023-03-26 PROCEDURE — 85027 COMPLETE CBC AUTOMATED: CPT

## 2023-03-26 PROCEDURE — 99239 HOSP IP/OBS DSCHRG MGMT >30: CPT

## 2023-03-26 PROCEDURE — 93306 TTE W/DOPPLER COMPLETE: CPT

## 2023-03-26 PROCEDURE — 85025 COMPLETE CBC W/AUTO DIFF WBC: CPT

## 2023-03-26 PROCEDURE — 99285 EMERGENCY DEPT VISIT HI MDM: CPT | Mod: 25

## 2023-03-26 PROCEDURE — 83735 ASSAY OF MAGNESIUM: CPT

## 2023-03-26 PROCEDURE — 80053 COMPREHEN METABOLIC PANEL: CPT

## 2023-03-26 PROCEDURE — 84100 ASSAY OF PHOSPHORUS: CPT

## 2023-03-26 PROCEDURE — 87637 SARSCOV2&INF A&B&RSV AMP PRB: CPT

## 2023-03-26 PROCEDURE — 93005 ELECTROCARDIOGRAM TRACING: CPT

## 2023-03-26 PROCEDURE — 71045 X-RAY EXAM CHEST 1 VIEW: CPT

## 2023-03-26 PROCEDURE — 80178 ASSAY OF LITHIUM: CPT

## 2023-03-26 PROCEDURE — 80048 BASIC METABOLIC PNL TOTAL CA: CPT

## 2023-03-26 RX ORDER — LOSARTAN POTASSIUM 100 MG/1
1 TABLET, FILM COATED ORAL
Qty: 0 | Refills: 0 | DISCHARGE

## 2023-03-26 RX ADMIN — LITHIUM CARBONATE 300 MILLIGRAM(S): 300 TABLET, EXTENDED RELEASE ORAL at 05:42

## 2023-03-26 RX ADMIN — Medication 300 MILLIGRAM(S): at 09:02

## 2023-03-26 RX ADMIN — FLUPHENAZINE HYDROCHLORIDE 5 MILLIGRAM(S): 1 TABLET, FILM COATED ORAL at 05:42

## 2023-03-26 RX ADMIN — Medication 50 MILLIGRAM(S): at 09:02

## 2023-03-26 RX ADMIN — Medication 81 MILLIGRAM(S): at 09:01

## 2023-03-26 RX ADMIN — Medication 100 MILLIGRAM(S): at 05:42

## 2023-03-26 RX ADMIN — Medication 2000 UNIT(S): at 09:02

## 2023-03-26 RX ADMIN — Medication 325 MILLIGRAM(S): at 09:02

## 2023-03-26 NOTE — DISCHARGE NOTE NURSING/CASE MANAGEMENT/SOCIAL WORK - PATIENT PORTAL LINK FT
You can access the FollowMyHealth Patient Portal offered by [facility] by registering at the following website: [email or web address]. By joining [facility]’s FollowMyHealth portal, you will also be able to view your health information using other applications (apps) compatible with our system.

## 2023-03-26 NOTE — DISCHARGE NOTE PROVIDER - NSDCMRMEDTOKEN_GEN_ALL_CORE_FT
amantadine 100 mg oral capsule: 1 cap(s) orally 2 times a day  ferrous sulfate 324 mg (65 mg elemental iron) oral tablet: 1 tab(s) orally once a day  Flomax 0.4 mg oral capsule: 1 cap(s) orally once a day  fluPHENAZine 5 mg oral tablet: 1 tab(s) orally 2 times a day  isoniazid 300 mg oral tablet: 1 tab(s) orally once a day  lithium 300 mg oral tablet: 1 tab(s) orally 2 times a day  LORazepam 1 mg oral tablet: 1 tab(s) orally 2 times a day  losartan 100 mg oral tablet: 1 tab(s) orally once a day  pyridoxine 50 mg oral tablet: 1 tab(s) orally once a day  traZODone 100 mg oral tablet: 1 tab(s) orally once a day (at bedtime)  Vitamin D3 125 mcg (5000 intl units) oral capsule: 1 cap(s) orally once a day

## 2023-03-26 NOTE — DISCHARGE NOTE PROVIDER - HOSPITAL COURSE
67 yom, PMH Bipolar 1, Schizoaffective/ Schizophrenia, Obese, GERD, leg edema, BPH, SIADH, Psychogenic polydipsia, HTN, latent TB, presents to ED via ambulance for dizziness. Noted to have symptomatic bradycardia in ED, Metoprolol held, cardiology consulted. ECHO was ordered, revealed EF 55%.Free water restriction for hyponatremia. Sodium improved. His heart rate improved and BP remained stable. He is medically stable for discharge to [facility]. Discussed with Dr Rosen at [facility].

## 2023-03-26 NOTE — PROGRESS NOTE ADULT - SUBJECTIVE AND OBJECTIVE BOX
INTERVAL HPI/OVERNIGHT EVENTS:    CC: symptomatic bradycardia, hyponatremia, bipolar disorder, hypertension    Chart and course reviewed  no overnight events  denies complaints.    Vital Signs Last 24 Hrs  T(C): 36.8 (25 Mar 2023 17:20), Max: 36.8 (25 Mar 2023 17:20)  T(F): 98.2 (25 Mar 2023 17:20), Max: 98.2 (25 Mar 2023 17:20)  HR: 68 (25 Mar 2023 17:20) (50 - 68)  BP: 137/67 (25 Mar 2023 17:20) (113/62 - 161/72)  BP(mean): --  RR: 18 (25 Mar 2023 17:20) (18 - 18)  SpO2: 98% (25 Mar 2023 17:20) (97% - 98%)    Parameters below as of 25 Mar 2023 17:20  Patient On (Oxygen Delivery Method): room air        PHYSICAL EXAM:    GENERAL: alert, not in distress  CHEST/LUNG: b/l air entry  HEART: reg  ABDOMEN: soft, bs+  EXTREMITIES:  no edema, tenderness    MEDICATIONS  (STANDING):  amantadine 100 milliGRAM(s) Oral two times a day  aspirin enteric coated 81 milliGRAM(s) Oral daily  cholecalciferol 2000 Unit(s) Oral daily  ferrous    sulfate 325 milliGRAM(s) Oral daily  fluPHENAZine 5 milliGRAM(s) Oral two times a day  isoniazid 300 milliGRAM(s) Oral daily  lithium 300 milliGRAM(s) Oral two times a day  pyridoxine 50 milliGRAM(s) Oral daily  tamsulosin 0.4 milliGRAM(s) Oral at bedtime  traZODone 100 milliGRAM(s) Oral at bedtime    MEDICATIONS  (PRN):      Allergies    No Known Allergies    Intolerances          LABS:                          12.3   9.49  )-----------( 220      ( 25 Mar 2023 04:51 )             37.2     03-25    135  |  104  |  20.5<H>  ----------------------------<  89  4.2   |  23.0  |  1.02    Ca    8.8      25 Mar 2023 04:51  Phos  3.7     03-24  Mg     2.1     03-25    TPro  6.7  /  Alb  3.9  /  TBili  0.4  /  DBili  x   /  AST  65<H>  /  ALT  31  /  AlkPhos  69  03-24          RADIOLOGY & ADDITIONAL TESTS:  
INTERVAL HPI/OVERNIGHT EVENTS:    CC: symptomatic bradycardia, hyponatremia, bipolar disorder, hypertension    No overnight events  denies complaints      Vital Signs Last 24 Hrs  T(C): 36.6 (26 Mar 2023 10:31), Max: 36.8 (25 Mar 2023 17:20)  T(F): 97.8 (26 Mar 2023 10:31), Max: 98.2 (25 Mar 2023 17:20)  HR: 58 (26 Mar 2023 10:31) (50 - 90)  BP: 129/74 (26 Mar 2023 10:31) (125/56 - 158/66)  BP(mean): --  RR: 18 (26 Mar 2023 10:31) (18 - 18)  SpO2: 98% (26 Mar 2023 10:31) (96% - 98%)    Parameters below as of 26 Mar 2023 10:31  Patient On (Oxygen Delivery Method): room air        PHYSICAL EXAM:    GENERAL: alert, not in distress  CHEST/LUNG: b/l air entry  HEART: reg  ABDOMEN: soft, bs+  EXTREMITIES:  no edema, tenderness    MEDICATIONS  (STANDING):  amantadine 100 milliGRAM(s) Oral two times a day  aspirin enteric coated 81 milliGRAM(s) Oral daily  cholecalciferol 2000 Unit(s) Oral daily  ferrous    sulfate 325 milliGRAM(s) Oral daily  fluPHENAZine 5 milliGRAM(s) Oral two times a day  isoniazid 300 milliGRAM(s) Oral daily  lithium 300 milliGRAM(s) Oral two times a day  pyridoxine 50 milliGRAM(s) Oral daily  tamsulosin 0.4 milliGRAM(s) Oral at bedtime  traZODone 100 milliGRAM(s) Oral at bedtime    MEDICATIONS  (PRN):      Allergies    No Known Allergies    Intolerances          LABS:                          12.7   8.37  )-----------( 167      ( 26 Mar 2023 05:35 )             38.2     03-26    136  |  104  |  15.4  ----------------------------<  97  4.4   |  24.0  |  0.99    Ca    8.5      26 Mar 2023 05:35  Phos  2.6     03-26  Mg     2.2     03-26    TPro  6.7  /  Alb  3.9  /  TBili  0.4  /  DBili  x   /  AST  65<H>  /  ALT  31  /  AlkPhos  69  03-24          RADIOLOGY & ADDITIONAL TESTS:  
S: Reports no symptoms. Sleeping when first encountered    Bradycardia    No pertinent family history in first degree relatives    Family history of diabetes mellitus (DM)    Handoff    MEWS Score    Other schizophrenia    Bipolar 1 disorder    Bipolar 1 disorder    Poor historian    Hypertension    Junctional bradycardia    No significant past surgical history    CARDIAC ISSUES    90+    SysAdmin_VisitLink        amantadine 100 milliGRAM(s) Oral two times a day  aspirin enteric coated 81 milliGRAM(s) Oral daily  cholecalciferol 2000 Unit(s) Oral daily  ferrous    sulfate 325 milliGRAM(s) Oral daily  fluPHENAZine 5 milliGRAM(s) Oral two times a day  isoniazid 300 milliGRAM(s) Oral daily  lithium 300 milliGRAM(s) Oral two times a day  pyridoxine 50 milliGRAM(s) Oral daily  tamsulosin 0.4 milliGRAM(s) Oral at bedtime  traZODone 100 milliGRAM(s) Oral at bedtime      T(C): 36.4 (03-26-23 @ 04:22), Max: 36.8 (03-25-23 @ 17:20)  HR: 50 (03-26-23 @ 04:22) (50 - 90)  BP: 141/72 (03-26-23 @ 04:22) (125/56 - 158/66)  RR: 18 (03-26-23 @ 04:22) (18 - 18)  SpO2: 96% (03-26-23 @ 04:22) (96% - 98%)    03-25-23 @ 07:01  -  03-26-23 @ 07:00  --------------------------------------------------------  IN: 550 mL / OUT: 0 mL / NET: 550 mL        Gen: no distress  CV: normal S1 and S2  Resp: Lungs CTA

## 2023-03-26 NOTE — DISCHARGE NOTE PROVIDER - CARE PROVIDER_API CALL
Khris Gonzalez)  Cardiac Electrophysiology  On license of [facility] [place] [address]  Phone: [phone]  Fax: [phone]  Follow Up Time:

## 2023-03-26 NOTE — DISCHARGE NOTE PROVIDER - NSDCFUSCHEDAPPT_GEN_ALL_CORE_FT
[facility] Physician [facility]  CARDIOLOGY [address]  Scheduled Appointment: 04/10/2023

## 2023-03-26 NOTE — DISCHARGE NOTE NURSING/CASE MANAGEMENT/SOCIAL WORK - NSDCPEFALRISK_GEN_ALL_CORE
For information on Fall & Injury Prevention, visit: [email or web address] OR  [email or web address] OR  https://www.cdc.gov/steadi/patient.html

## 2023-03-26 NOTE — DISCHARGE NOTE NURSING/CASE MANAGEMENT/SOCIAL WORK - NSDCFUADDAPPT_GEN_ALL_CORE_FT
Patient's plan is to return to [facility] [address]. Accepting MD at [facility] is Dr. Rosen.

## 2023-03-26 NOTE — DISCHARGE NOTE PROVIDER - NSDCCPCAREPLAN_GEN_ALL_CORE_FT
PRINCIPAL DISCHARGE DIAGNOSIS  Diagnosis: Junctional bradycardia  Assessment and Plan of Treatment: improved  follow up with EP in 2 weeks  continue to hold Metoprolol      SECONDARY DISCHARGE DIAGNOSES  Diagnosis: Bipolar disorder  Assessment and Plan of Treatment: Continue medications as prior    Diagnosis: Schizoaffective disorder  Assessment and Plan of Treatment: continue medications.    Diagnosis: History of BPH  Assessment and Plan of Treatment: follow up with urology upon discharge.

## 2023-03-26 NOTE — PROGRESS NOTE ADULT - ASSESSMENT
- near syncope secondary to metoprolol-induced junctional bradycardia. Resolved. Echo shows normal cardiac structure and function. Tele shows sinus poonam but during sleep. Asymptomatic    Recommendations  Ok to discharge from hospital from a cardiac perspective.   Follow up in 2 weeks.   
67 yom, PMH Bipolar 1, Schizoaffective/ Schizophrenia, Obese, GERD, leg edema, BPH, SIADH, Psychogenic polydipsia, HTN, latent TB, presents to ED via ambulance for dizziness. Noted to have symptomatic bradycardia in ED, Metoprolol held, cardiology consulted. ECHO was ordered, revealed EF 55%.Free water restriction for hyponatremia. Sodium improved.    1. Symptomatic Junctional Bradycardia. Currently in 50s HR  now improved  cardiology follow up noted  ECHO wnl  continue to hold Metoprolol    2. Hyponatremia, Mild  Likely sec to psych meds vs psychogenic polydipsia   ·	Trend Na  ·	Free water restriction    3. HTN controlled  Cont losartan    4. Bipolar 1/Schizophrenia:  Continue Trazadone, Lithium, Amantadine    5. Latent TB  On INH and Pyridoxine    6. BPH  Cont Flomax    DVT prophylaxis: Lovenox    Discussed with patient.   Attempted to call [facility]ORRES regarding discharge, no answer.  Will attempt again.  Medically stable.        
67 yom, PMH Bipolar 1, Schizoaffective/ Schizophrenia, Obese, GERD, leg edema, BPH, SIADH, Psychogenic polydipsia, HTN, latent TB, presents to ED via ambulance for dizziness. Noted to have symptomatic bradycardia in ED, Metoprolol held, cardiology consulted. ECHO was ordered, revealed EF 55%.Free water restriction for hyponatremia.    1. Symptomatic Junctional Bradycardia. Currently in 50s HR  Cardiology following  HR improved  ECHO noted  continue telemetry    2. Hyponatremia, Mild  Likely sec to psych meds vs psychogenic polydipsia   ·	Trend Na  ·	Free water restriction    3. HTN controlled  Cont losartan    4. Bipolar 1/Schizophrenia:  Continue Trazadone, Lithium, Amantadine    5. Latent TB  On INH and Pyridoxine    6. BPH  Cont Flomax    DVT prophylaxis: Lovenox    Discussed with patient.

## 2023-03-30 PROBLEM — I10 ESSENTIAL (PRIMARY) HYPERTENSION: Chronic | Status: ACTIVE | Noted: 2023-03-24

## 2023-04-03 ENCOUNTER — NON-APPOINTMENT (OUTPATIENT)
Age: 68
End: 2023-04-03

## 2023-04-04 ENCOUNTER — APPOINTMENT (OUTPATIENT)
Dept: UROLOGY | Facility: HOSPITAL | Age: 68
End: 2023-04-04

## 2023-04-10 ENCOUNTER — APPOINTMENT (OUTPATIENT)
Dept: CARDIOLOGY | Facility: CLINIC | Age: 68
End: 2023-04-10

## 2023-04-27 NOTE — ED ADULT TRIAGE NOTE - BMI (KG/M2)
25.1 Chonodrocutaneous Helical Advancement Flap Text: The defect edges were debeveled with a #15 scalpel blade.  Given the location of the defect and the proximity to free margins a chondrocutaneous helical advancement flap was deemed most appropriate.  Using a sterile surgical marker, the appropriate advancement flap was drawn incorporating the defect and placing the expected incisions within the relaxed skin tension lines where possible.    The area thus outlined was incised deep to adipose tissue with a #15 scalpel blade.  The skin margins were undermined to an appropriate distance in all directions utilizing iris scissors.

## 2023-06-07 NOTE — PATIENT PROFILE ADULT. - PAIN CHRONIC, PROFILE
-- DO NOT REPLY / DO NOT REPLY ALL --  -- Message is from [facility]agement Center Operations ([facility]) --    General Patient Message: Patient would like clinical team of Dr. Shelly Laura to know that he did receive the message in regards to changing his medication.      Caller Information       Type Contact Phone/Fax    06/06/2023 05:43 PM CDT Phone (Outgoing) Morganndget Cedric S (Self) [phone] (M)    Left Message     06/07/2023 01:49 PM CDT Phone (Incoming) Ariana Cedric S (Self) [phone] (M)        Alternative phone number: None    Can a detailed message be left? Yes    Message Turnaround:     Is it Working Hours? Yes - Working Hours     [place]:    Please give this turnaround time to the caller:   \"This message will be sent to [state Provider's name]. The clinical team will fulfill your request as soon as they review your message.\"                
----- Message from Shelly Laura MD sent at 6/2/2023  6:57 PM CDT -----  To take Methotrexate 5 a week instead of 6 pills  ----- Message -----  From: Lab, Background User  Sent: 6/1/2023   8:00 PM CDT  To: Shelly Laura MD      
Left a voice mail message advising the patient to take Methotrexate 5 pills a week instead of 6 pills.  
no

## 2023-08-01 ENCOUNTER — OUTPATIENT (OUTPATIENT)
Dept: OUTPATIENT SERVICES | Facility: HOSPITAL | Age: 68
LOS: 1 days | End: 2023-08-01
Payer: COMMERCIAL

## 2023-08-01 DIAGNOSIS — F20.9 SCHIZOPHRENIA, UNSPECIFIED: ICD-10-CM

## 2023-08-01 PROCEDURE — 0225U NFCT DS DNA&RNA 21 SARSCOV2: CPT

## 2023-08-21 ENCOUNTER — APPOINTMENT (OUTPATIENT)
Dept: UROLOGY | Facility: CLINIC | Age: 68
End: 2023-08-21
Payer: MEDICARE

## 2023-08-21 VITALS
HEIGHT: 64 IN | BODY MASS INDEX: 30.56 KG/M2 | TEMPERATURE: 97.3 F | HEART RATE: 70 BPM | SYSTOLIC BLOOD PRESSURE: 168 MMHG | WEIGHT: 179 LBS | DIASTOLIC BLOOD PRESSURE: 83 MMHG

## 2023-08-21 DIAGNOSIS — R30.0 DYSURIA: ICD-10-CM

## 2023-08-21 DIAGNOSIS — R93.5 ABNORMAL FINDINGS ON DIAGNOSTIC IMAGING OF OTHER ABDOMINAL REGIONS, INCLUDING RETROPERITONEUM: ICD-10-CM

## 2023-08-21 DIAGNOSIS — R97.20 ELEVATED PROSTATE, SPECIFIC ANTIGEN [PSA]: ICD-10-CM

## 2023-08-21 PROCEDURE — 99214 OFFICE O/P EST MOD 30 MIN: CPT

## 2023-08-21 RX ORDER — CIPROFLOXACIN HYDROCHLORIDE 500 MG/1
500 TABLET, FILM COATED ORAL
Qty: 14 | Refills: 0 | Status: ACTIVE | COMMUNITY
Start: 2023-08-21 | End: 1900-01-01

## 2023-08-21 NOTE — HISTORY OF PRESENT ILLNESS
[None] : no symptoms [FreeTextEntry1] : 66 yo presents today for an initial visit for a prostate biopsy consultation.  Hx of schizophrenia and bipolar disorder.  Hx of aggressive behavior, BPH, GERD, HTN, ankle surgery.  MRI prostate 1.31.23. PS = 35 mL PIRADS 4 Left, posterior lateral (PZpl), [id number], and PIRADS 3 lesion Right, posterior lateral (PZpl), apex, peripheral zone. No extraprostatic extension, seminal vesicle invasion, or pelvic lymphadenopathy.  Recent PSA 6.2. He is in need of fusion transperineal prostate biopsy and is here for this consultation. Fam Hx: neg for prostate cancer august 2023: still has not gotten a prostate biopsy yet. still needs consent from caregiver (sister/daughter). has dysuria r/o UTI

## 2023-08-21 NOTE — REASON FOR VISIT
[Other: ______] : provided by JUAN [Initial Visit ___] : [unfilled] is here today for an initial visit  for [unfilled]

## 2023-08-21 NOTE — ASSESSMENT
[FreeTextEntry1] : 68 yo presents today for an initial visit for a prostate biopsy consultation.  Hx of schizophrenia and bipolar disorder.  Hx of aggressive behavior, BPH, GERD, HTN, ankle surgery.  MRI prostate 1.31.23: PS = 35 mL // PIRADS 4 Left, posterior lateral (PZpl), [id number], and PIRADS 3 lesion Right, posterior lateral (PZpl), apex, peripheral zone. No extraprostatic extension, seminal vesicle invasion, or pelvic lymphadenopathy.   Plan: UA reflex cipro 500 BID x 7 days I had a discussion with the [facility], patient's nurse, the implication of an elevated PSA and the need for further evaluation to biopsy the prostate using the transperineal approach.  we took the number of the clinic to be contacted to obtain consent.   The potential side effects including bleeding and infection were also detailed. Additionally, we discussed the potential implication of a positive biopsy for prostate cancer, and depending on the grade and stage of the cancer the need for treatment including, active surveillance, radiation, radiation seed implants and surgery.    The patient has agreed to proceed with prostate biopsy. He will stop all aspirin and aspirin like products 10 days prior to the biopsy. There is no need for pre-procedural antibiotics, and he will self-administer a cleansing Fleet's enema just prior to the biopsy.

## 2023-08-22 LAB
APPEARANCE: CLEAR
BILIRUBIN URINE: NEGATIVE
BLOOD URINE: NEGATIVE
COLOR: YELLOW
GLUCOSE QUALITATIVE U: NEGATIVE MG/DL
KETONES URINE: NEGATIVE MG/DL
LEUKOCYTE ESTERASE URINE: NEGATIVE
NITRITE URINE: NEGATIVE
PH URINE: 6.5
PROTEIN URINE: NEGATIVE MG/DL
SPECIFIC GRAVITY URINE: 1.01
UROBILINOGEN URINE: 0.2 MG/DL

## 2023-08-24 ENCOUNTER — OUTPATIENT (OUTPATIENT)
Dept: OUTPATIENT SERVICES | Facility: HOSPITAL | Age: 68
LOS: 1 days | End: 2023-08-24
Payer: MEDICARE

## 2023-08-24 DIAGNOSIS — Z00.8 ENCOUNTER FOR OTHER GENERAL EXAMINATION: ICD-10-CM

## 2023-08-24 PROCEDURE — 76377 3D RENDER W/INTRP POSTPROCES: CPT | Mod: 26

## 2023-08-24 PROCEDURE — C8001: CPT

## 2023-09-06 NOTE — ED ADULT NURSE NOTE - ED COMFORT CARE
Reason For Visit:   Chief Complaint   Patient presents with    Consult     Plantar fasciitis       Pain Assessment  Patient Currently in Pain: Bee Cooley   
Patient informed

## 2023-09-28 ENCOUNTER — APPOINTMENT (OUTPATIENT)
Dept: GASTROENTEROLOGY | Facility: CLINIC | Age: 68
End: 2023-09-28
Payer: MEDICARE

## 2023-09-28 VITALS
HEIGHT: 64 IN | OXYGEN SATURATION: 77 % | DIASTOLIC BLOOD PRESSURE: 80 MMHG | BODY MASS INDEX: 32.44 KG/M2 | WEIGHT: 190 LBS | SYSTOLIC BLOOD PRESSURE: 182 MMHG | RESPIRATION RATE: 16 BRPM | TEMPERATURE: 97.3 F | HEART RATE: 84 BPM

## 2023-09-28 DIAGNOSIS — Z12.11 ENCOUNTER FOR SCREENING FOR MALIGNANT NEOPLASM OF COLON: ICD-10-CM

## 2023-09-28 DIAGNOSIS — R58 HEMORRHAGE, NOT ELSEWHERE CLASSIFIED: ICD-10-CM

## 2023-09-28 DIAGNOSIS — D64.9 ANEMIA, UNSPECIFIED: ICD-10-CM

## 2023-09-28 DIAGNOSIS — E61.1 IRON DEFICIENCY: ICD-10-CM

## 2023-09-28 PROCEDURE — 99214 OFFICE O/P EST MOD 30 MIN: CPT

## 2023-09-28 RX ORDER — LOSARTAN POTASSIUM 100 MG/1
100 TABLET, FILM COATED ORAL
Refills: 0 | Status: ACTIVE | COMMUNITY

## 2023-09-28 RX ORDER — LACTULOSE 10 G/15ML
20 SOLUTION ORAL
Refills: 0 | Status: ACTIVE | COMMUNITY

## 2023-09-28 RX ORDER — OLANZAPINE 20 MG/1
20 TABLET, FILM COATED ORAL
Refills: 0 | Status: ACTIVE | COMMUNITY

## 2023-09-28 RX ORDER — HYDRALAZINE HYDROCHLORIDE 50 MG/1
50 TABLET ORAL
Refills: 0 | Status: ACTIVE | COMMUNITY

## 2023-09-28 RX ORDER — MULTIVIT-MIN/FOLIC/VIT K/LYCOP 400-300MCG
50 MCG TABLET ORAL
Refills: 0 | Status: ACTIVE | COMMUNITY

## 2023-09-28 RX ORDER — FLUPHENAZINE DECANOATE 25 MG/ML
25 INJECTION, SOLUTION INTRAMUSCULAR; SUBCUTANEOUS
Refills: 0 | Status: ACTIVE | COMMUNITY

## 2023-09-28 RX ORDER — OLANZAPINE 10 MG/1
10 TABLET, FILM COATED ORAL
Refills: 0 | Status: ACTIVE | COMMUNITY

## 2023-09-28 RX ORDER — DIVALPROEX SODIUM 500 MG/1
500 TABLET, DELAYED RELEASE ORAL
Refills: 0 | Status: ACTIVE | COMMUNITY

## 2023-09-28 RX ORDER — HYDROCORTISONE 1 %
CREAM (GRAM) TOPICAL
Refills: 0 | Status: ACTIVE | COMMUNITY

## 2023-10-18 NOTE — ED ADULT NURSE NOTE - NS_NURSE_RECEIVING_PHYS_ED_ALL_ED_FT
Surgical History:   Procedure Laterality Date    COLONOSCOPY  11/09/2016    normal/ hemmoroids    COLONOSCOPY N/A 5/3/2021    COLONOSCOPY WITH BIOPSY performed by Jon Storm MD at [address] WITH BIOPSY (N/A )    SHOULDER SURGERY         CURRENT MEDICATIONS:       Previous Medications    ASPIRIN 81 MG TABLET    Take 1 tablet by mouth daily    CHOLECALCIFEROL (VITAMIN D) 2000 UNITS CAPS CAPSULE    Take by mouth    DICLOFENAC SODIUM (VOLTAREN) 1 % GEL    Apply 4 g topically 4 times daily as needed for Pain    DICLOFENAC SODIUM 1 % GEL    Apply 4 g topically 4 times daily    DONEPEZIL (ARICEPT) 10 MG TABLET    1 tablet    FLUTICASONE (FLONASE) 50 MCG/ACT NASAL SPRAY    1 spray by Each Nostril route daily    HANDICAP PLACARD MISC    by Does not apply route 04/07/22    HYDROXYZINE HCL (ATARAX) 25 MG TABLET    TAKE 1 TABLET BY MOUTH ONCE DAILY AS NEEDED FOR ANXIETY    LISINOPRIL (PRINIVIL;ZESTRIL) 20 MG TABLET    Take 1 tablet by mouth once daily    MAGNESIUM (MAGNESIUM-OXIDE) 250 MG TABS TABLET    Take 1 tablet by mouth at bedtime    MELATONIN 5 MG TABS TABLET    Take 1 tablet by mouth at bedtime    MULTIPLE VITAMINS-MINERALS (THERAPEUTIC MULTIVITAMIN-MINERALS) TABLET    Take 1 tablet by mouth daily    SERTRALINE (ZOLOFT) 50 MG TABLET    Take 1.5 tablets by mouth daily    TIZANIDINE (ZANAFLEX) 2 MG TABLET    Take 1 tablet by mouth every 8 hours as needed (muscle spasm)    TRAZODONE (DESYREL) 50 MG TABLET    Take 1 tablet by mouth nightly       ALLERGIES:    Codeine, Erythromycin, and Toprol xl [metoprolol]    FAMILY HISTORY:     @[facility]    SOCIAL HISTORY:       Social History     Socioeconomic History    Marital status:      Spouse name: None    Number of children: None    Years of education: None    Highest education level: None   Tobacco Use    Smoking status: Never    Smokeless tobacco: Never   Substance and Sexual Activity    Alcohol use:  No Dr. Mcgregor

## 2023-10-24 ENCOUNTER — APPOINTMENT (OUTPATIENT)
Dept: UROLOGY | Facility: HOSPITAL | Age: 68
End: 2023-10-24

## 2023-10-31 ENCOUNTER — APPOINTMENT (OUTPATIENT)
Dept: DERMATOLOGY | Facility: CLINIC | Age: 68
End: 2023-10-31
Payer: MEDICARE

## 2023-10-31 PROCEDURE — 99202 OFFICE O/P NEW SF 15 MIN: CPT | Mod: 25

## 2023-10-31 PROCEDURE — 17110 DESTRUCTION B9 LES UP TO 14: CPT

## 2023-11-16 ENCOUNTER — APPOINTMENT (OUTPATIENT)
Dept: GASTROENTEROLOGY | Facility: HOSPITAL | Age: 68
End: 2023-11-16

## 2023-12-07 RX ORDER — POLYETHYLENE GLYCOL-3350 AND ELECTROLYTES WITH FLAVOR PACK 240; 5.84; 2.98; 6.72; 22.72 G/278.26G; G/278.26G; G/278.26G; G/278.26G; G/278.26G
240 POWDER, FOR SOLUTION ORAL
Qty: 1 | Refills: 0 | Status: ACTIVE | COMMUNITY
Start: 2023-09-28 | End: 1900-01-01

## 2023-12-20 ENCOUNTER — OFFICE (OUTPATIENT)
Dept: URBAN - METROPOLITAN AREA CLINIC 112 | Facility: CLINIC | Age: 68
Setting detail: OPHTHALMOLOGY
End: 2023-12-20
Payer: MEDICARE

## 2023-12-20 DIAGNOSIS — H04.121: ICD-10-CM

## 2023-12-20 DIAGNOSIS — H04.122: ICD-10-CM

## 2023-12-20 DIAGNOSIS — H25.13: ICD-10-CM

## 2023-12-20 PROCEDURE — 83861 MICROFLUID ANALY TEARS: CPT | Mod: QW,RT | Performed by: OPHTHALMOLOGY

## 2023-12-20 PROCEDURE — 83861 MICROFLUID ANALY TEARS: CPT | Mod: QW,LT | Performed by: OPHTHALMOLOGY

## 2023-12-20 PROCEDURE — 92014 COMPRE OPH EXAM EST PT 1/>: CPT | Performed by: OPHTHALMOLOGY

## 2023-12-20 ASSESSMENT — SPHEQUIV_DERIVED
OD_SPHEQUIV: 1.25
OS_SPHEQUIV: 0.875

## 2023-12-20 ASSESSMENT — REFRACTION_MANIFEST
OS_SPHERE: +0.75
OD_VA1: 20/25
OD_SPHERE: +1.00
OS_VA1: 20/30+
OS_ADD: +2.00
OD_ADD: +2.00

## 2023-12-20 ASSESSMENT — CONFRONTATIONAL VISUAL FIELD TEST (CVF)
OD_FINDINGS: FULL
OS_FINDINGS: FULL

## 2023-12-20 ASSESSMENT — LID EXAM ASSESSMENTS
OS_BLEPHARITIS: T 1+
OD_BLEPHARITIS: T 1+

## 2023-12-20 ASSESSMENT — REFRACTION_AUTOREFRACTION
OD_CYLINDER: -0.50
OD_SPHERE: +1.50
OD_AXIS: 129
OS_CYLINDER: -0.25
OS_AXIS: 137
OS_SPHERE: +1.00

## 2024-01-03 NOTE — DISCHARGE NOTE PROVIDER - NSDCCPTREATMENT_GEN_ALL_CORE_FT
Mily Collins is a 18 year old female here presenting with:    Reported symptoms: Tonsilitis a little bit over a week, sore throat, hurts to swallow, headaches, bilateral ear pain    Denies: fever    OTC medications/Home remedy: liquid ibuprofen     Recent ABX use: none    Work note needed: none    Patient would like communication of their results via:      Cell Phone:   Telephone Information:   Mobile [phone]     Okay to leave a message containing results? Yes      Visit Vitals  /85   Pulse (!) 108   Temp 99 °F (37.2 °C) (Temporal)   Resp 18   Wt 73.9 kg (163 lb)   LMP 12/13/2023 (Exact Date)   SpO2 99%          PRINCIPAL PROCEDURE  Procedure: Left heart cardiac catheterization  Findings and Treatment: Patient now status post left heart catheterization which showed normal coronaries. He is to follow with Dr. Quick in 2-3 weeks. Continue current medical management.

## 2024-01-04 NOTE — ED ADULT NURSE NOTE - CCCP TRG CHIEF CMPLNT
abnormal lab result

## 2024-01-08 ENCOUNTER — APPOINTMENT (OUTPATIENT)
Dept: UROLOGY | Facility: CLINIC | Age: 69
End: 2024-01-08

## 2024-02-15 ENCOUNTER — APPOINTMENT (OUTPATIENT)
Dept: GASTROENTEROLOGY | Facility: HOSPITAL | Age: 69
End: 2024-02-15

## 2024-03-11 ENCOUNTER — APPOINTMENT (OUTPATIENT)
Dept: UROLOGY | Facility: CLINIC | Age: 69
End: 2024-03-11

## 2024-04-01 DIAGNOSIS — N40.0 BENIGN PROSTATIC HYPERPLASIA WITHOUT LOWER URINARY TRACT SYMPMS: ICD-10-CM

## 2024-04-01 RX ORDER — FINASTERIDE 5 MG/1
5 TABLET, FILM COATED ORAL DAILY
Qty: 90 | Refills: 3 | Status: ACTIVE | COMMUNITY
Start: 2024-04-01 | End: 1900-01-01

## 2024-04-06 NOTE — ED PROVIDER NOTE - EKG ADDITIONAL QUESTION - PERFORMED INDEPENDENT VISUALIZATION
Patient's anemia is currently controlled. Has not received any PRBCs to date.   Current CBC reviewed-   Lab Results   Component Value Date    HGB 10.1 (L) 04/06/2024    HCT 30.9 (L) 04/06/2024     Monitor serial CBC and transfuse if patient becomes hemodynamically unstable, symptomatic or H/H drops below 7/21.  Iron def anemia   Yes

## 2024-04-08 ENCOUNTER — APPOINTMENT (OUTPATIENT)
Dept: UROLOGY | Facility: CLINIC | Age: 69
End: 2024-04-08

## 2024-05-02 NOTE — H&P ADULT - NSICDXNOFAMILYHX_GEN_ALL_CORE
CHIEF COMPLAINT:  Follow up warts    HISTORY OF PRESENT ILLNESS:  This is a 60 year old female patient last seen 3 weeks ago for warts on the right dorsal third finger and right dorsal index finger.  We have been treating with Fluorouracil 5%, Salicylic acid 30% solution daily and candida antigen therapy.  Previous treatments have included the above treatments.  Overall, they are not improving.  She says they are uglier. She was unsure if she should still be using the topical. She has been using it for 2 days.        In addition, she has questions about Amzeeq 4% external foam for her rosacea.  She is worried about the side effects.  She has only used it about 4 times.           PHYSICAL EXAM:  The patient is well nourished, well developed, alert and oriented to person, place, and time, and has appropriate mood and affect.      Significant findings as follows:  1.  Right dorsal index finger x 1 and larger right third 4-6 verrucous papule     ASSESSMENT/PLAN:  1.  Warts. I recommended that we continue with further cryotherapy and the patient agreed and gave verbal consent for the same, so the sites were treated with liquid nitrogen.  Lesions continue to be present and, therefore, 2 of the largest lesions were injected with candida antigen for a total volume injected of 0.3 mL.  This was the 2nd injection.  Band-Aids were applied to both sites.  The patient will return in 3-4 weeks for further evaluation and treatment if needed.  She will continue the topical treatment at home between as well.     The patient was seen and examined by Dr. Amaral in the presence of VALENTE Jasso.  This progress note was in part created by VALENTE Jasso acting as a scribe for María Amaral MD.     The documentation recorded by the scribe accurately and completely reflects the service(s) I personally performed and the decisions made by me.         <-- Click to add NO pertinent Family History

## 2024-05-14 NOTE — H&P ADULT - NSHPPOAURINARYCATHETER_GEN_ALL_CORE
Subjective   Patient ID: Eunice Schroeder is a 56 y.o. female who presents for Follow-up (Patient presented today for a 4  month follow up./).    HPI   OARRS:  Carlos Gómez MD on 5/14/2024  4:47 PM  I have personally reviewed the OARRS report for Eunice Schroeder. I have considered the risks of abuse, dependence, addiction and diversion    Is the patient prescribed a combination of a benzodiazepine and opioid?  Yes, I feel it is clincially indicated to continue the medication and have discussed with the patient risks/benefits/alternatives.    Last Urine Drug Screen / ordered today: No  No results found for this or any previous visit (from the past 8760 hour(s)).  N/A    Clinical rationale for not completing a Urine Drug Screen: Fasting lab work to be done soon.      Controlled Substance Agreement:  Date of the Last Agreement: March 30, 2023, to be renewed next visit.  Reviewed Controlled Substance Agreement including but not limited to the benefits, risks, and alternatives to treatment with a Controlled Substance medication(s).    Opioids:  What is the patient's goal of therapy?  Safe and reasonable control of pain, while watching out for any effects of polypharmacy.  Receiving benzodiazepine, using sparingly and carefully, clear of PSYCHIATRY.  Is this being achieved with current treatment?  Yes.    I have calculated the patient's Morphine Dose Equivalent (MED):   I have considered referral to Pain Management and/or a specialist, and do not feel it is necessary at this time.    I feel that it is clinically indicated to continue this current medication regimen after consideration of alternative therapies, and other non-opioid treatment.    Opioid Risk Screening:    No data recorded    Pain Assessment:  No data recorded      No headache, blurred vision, diplopia.  No dysphagia.  No focal weakness, ataxia, clumsiness.  No falls.  No paresthesia.  No confusion or delirium, with patient not worried about  "memory.  Not depressive or suicidal, with patient not wishing harm to self or others.      In the meantime, compliant with medications, tolerating regimens.  Physically active.  Continues to follow closely with endocrinology, rheumatology, and seen at least once a year by NEPHROLOGY.  No symptoms referable to uremia.  No change in urine character or habits.    Respectfully refusing colonoscopy.  Appetite preserved, with no change in bowel or bladder character or habits.  No skin changes.  No particular cough or sputum production.  CONSTITUTIONALLY, no fever, no chills.  No night sweats.  No lingering anorexia or nausea.  No apparent lymphadenopathy.  No apparent weight loss.    No chest pain, palpitations, orthopnea, paroxysmal nocturnal dyspnea.  Eager to remain healthy, independent, and productive, does not wish harm to self or others, as above.        Review of Systems  Review of systems as in history of present illness, and otherwise, reviewed separately as well, and was unremarkable/negative/noncontributory.        Objective   BP 88/64 (BP Location: Right arm, Patient Position: Sitting, BP Cuff Size: Adult)   Pulse 77   Ht 1.727 m (5' 8\")   Wt 77.1 kg (170 lb)   SpO2 97%   BMI 25.85 kg/m²     Physical Exam  In good spirits.  Not in distress or diaphoresis.  Alert, oriented x 3.  Amiable.  Not unkempt.  Receptive, cheerful, appropriate.  Eager to maintain and improve quality of life.  Does not wish harm to self or others.  Moderately built.  Appropriate.    HEAD pink palpebral conjunctivae, anicteric sclerae.  NECK supple, no apparent jugular venous distention.  CARDIOVASCULAR not in distress or diaphoresis.  No bipedal edema.  LUNGS not in distress or diaphoresis.  Not using accessory muscles.  ABDOMEN soft, nontender.  BACK no costovertebral angle tenderness.  EXTREMITIES no clubbing, no cyanosis.  NEURO no facial asymmetry.  No apparent cranial nerve deficits.  Romberg negative.  Ambulating without need " of assistance.  No apparent focal weakness.  No tremors.  PSYCH receptive, appropriate, and eager to maintain and improve quality of life.        LABORATORY results reviewed with patient.        Assessment/Plan   Diagnoses and all orders for this visit:  Chronic pain syndrome  -     Follow Up In Primary Care - Established  -     Follow Up In Primary Care - Established; Future  -     Comprehensive Metabolic Panel; Future  -     Drug Screen, Urine With Reflex to Confirmation; Future  Polypharmacy  -     Follow Up In Primary Care - Established; Future  -     Comprehensive Metabolic Panel; Future  -     Drug Screen, Urine With Reflex to Confirmation; Future  Mild recurrent major depression (CMS-HCC)  -     Follow Up In Primary Care - Established; Future  -     Comprehensive Metabolic Panel; Future  -     TSH with reflex to Free T4 if abnormal; Future  -     Drug Screen, Urine With Reflex to Confirmation; Future  Hyperuricemia  -     Follow Up In Primary Care - Established; Future  -     Comprehensive Metabolic Panel; Future  -     Uric Acid; Future  Stage 3a chronic kidney disease (Multi)  -     Follow Up In Primary Care - Established; Future  -     Comprehensive Metabolic Panel; Future  -     CBC and Auto Differential; Future  -     Magnesium; Future  -     Urinalysis with Reflex Culture and Microscopic; Future  -     Uric Acid; Future  -     Creatine Kinase; Future  Microalbuminuria  -     Follow Up In Primary Care - Established; Future  -     Urinalysis with Reflex Culture and Microscopic; Future  Type 2 diabetes mellitus with stage 3b chronic kidney disease, without long-term current use of insulin (Multi)  -     Follow Up In Primary Care - Established; Future  -     Comprehensive Metabolic Panel; Future  Mixed hyperlipidemia  -     Follow Up In Primary Care - Established; Future  -     Comprehensive Metabolic Panel; Future  -     Lipid Panel; Future  Rocky cardiac risk <10% in next 10 years  Comments:  5.8%  05/24  Orders:  -     Follow Up In Primary Care - Established; Future  -     Comprehensive Metabolic Panel; Future  -     Lipid Panel; Future  Vitamin B12 deficiency  -     Follow Up In Primary Care - Established; Future  -     CBC and Auto Differential; Future  -     Vitamin B12; Future  Vitamin D deficiency  -     Follow Up In Primary Care - Established; Future  -     Comprehensive Metabolic Panel; Future  -     Vitamin D 25-Hydroxy,Total (for eval of Vitamin D levels); Future  Hypothyroidism due to acquired atrophy of thyroid  -     Follow Up In Primary Care - Established; Future  -     TSH with reflex to Free T4 if abnormal; Future  Seasonal allergic rhinitis due to pollen  -     fexofenadine (Allegra) 180 mg tablet; Please take 1 tablet by mouth with supper.  Thank you.  Lots of fluids throughout the day.  -     Follow Up In Primary Care - Established; Future  -     CBC and Auto Differential; Future  -     Magnesium; Future  Nausea  -     ondansetron ODT (Zofran-ODT) 4 mg disintegrating tablet; Take 1 tablet (4 mg) by mouth every 8 hours if needed for nausea or vomiting.  -     Follow Up In Primary Care - Established; Future  -     Comprehensive Metabolic Panel; Future  -     Urinalysis with Reflex Culture and Microscopic; Future  -     Drug Screen, Urine With Reflex to Confirmation; Future       Thank you very much for coming.  I am very happy to see you.    I am glad to hear that you have been doing well with your pain regimens, and that you have not had any untoward side effects like confusion, excessive tiredness, or recent falling.  I am glad that you have been doing relatively well.  As always, please use your medications only when absolutely necessary to avoid any complications.    After taking your pain regimen, no driving for 6 hours.  Never drive if you are drowsy.  Do not take with alcohol.    You will be due for repeat FASTING laboratory examinations to be done in about 2 months.  Please do these a  few days before your next appointment.  You can have them done at [facility].    Until then, please continue to take care of yourself and your family, and please continue to pray for our recovery from this pandemic.  I hope you continue to stay healthy and independent, and I do hope that you have a good summer time!            0  Return in 2 months.  20 minutes please.  Repeat FASTING laboratory examinations a few days prior, then see me for reevaluation of mood, energy, function, preventive strategies, cardiovascular risk.            0  Patient respectfully refusing colonoscopy.  We will discuss other options next visit.            0  Patient overdue for renewal of CSA, to be done next visit, July.            0     no

## 2024-07-18 NOTE — ED PROVIDER NOTE - DOMESTIC TRAVEL HIGH RISK QUESTION
No Render Note In Bullet Format When Appropriate: No Detail Level: Zone Show Aperture Variable?: Yes Consent: The patient's consent was obtained including but not limited to risks of crusting, scabbing, blistering, scarring, darker or lighter pigmentary change, recurrence, incomplete removal and infection. Number Of Freeze-Thaw Cycles: 3 freeze-thaw cycles Post-Care Instructions: I reviewed with the patient in detail post-care instructions. Patient is to wear sunprotection, and avoid picking at any of the treated lesions. Pt may apply Vaseline to crusted or scabbing areas. Duration Of Freeze Thaw-Cycle (Seconds): 3 Spray Paint Text: The liquid nitrogen was applied to the skin utilizing a spray paint frosting technique. Detail Level: Detailed Medical Necessity Information: It is in your best interest to select a reason for this procedure from the list below. All of these items fulfill various CMS LCD requirements except the new and changing color options. Medical Necessity Clause: This procedure was medically necessary because the lesions that were treated were:

## 2024-09-20 NOTE — PROGRESS NOTE ADULT - SUBJECTIVE AND OBJECTIVE BOX
62yMale admitted to med/surg with following history:  HPI:  63 yo M with PMHx of Bipolar DZ with manic episodes hyponatremia with polydipsia and GERD. Pt manic in ED, refusing to be seem. Called [facility] and spoke on the phone with the PA taking care of him Anthony.  Psych is Dr Zaida Villalpando. She states that pt stated developing hyponatremia few months back with prior hospitalization for it in [place] on 3/24/18 and was discharged on fluid restriction 1l and salt tabs. Pt is non-compliant and drink an excessive amount of water every where he can find, even if the nurses try to keep a close eye on him. His manic episodes became more and more advanced as his hyponatremia worsened. HCP is daughter Berlin Burciaga [phone].  In ED he reeived Zyprexa 5 mg IM x 1   inc bp 182/88 hr 67 agitated , afebrile (02 Apr 2018 19:16)      Psych HPI: Patient seen, evaluated and chart reviewed. Patient is a 63 y/o HM with history of bipolar disorder, who is currently internally preoccupied, and refuses to engage. He is paranoid and hostile.    PAST MEDICAL & SURGICAL HISTORY:  Bipolar 1 disorder  Bipolar 1 disorder  Other schizophrenia  No significant past surgical history      Allergies    No Known Allergies    Intolerances      MEDICATIONS  (STANDING):  diVALproex  milliGRAM(s) Oral <User Schedule>  enoxaparin Injectable 40 milliGRAM(s) SubCutaneous daily  pantoprazole    Tablet 40 milliGRAM(s) Oral before breakfast    MEDICATIONS  (PRN):  acetaminophen   Tablet. 650 milliGRAM(s) Oral every 6 hours PRN Mild Pain (1 - 3)  aluminum hydroxide/magnesium hydroxide/simethicone Suspension 30 milliLiter(s) Oral every 4 hours PRN Dyspepsia  LORazepam     Tablet 2 milliGRAM(s) Oral three times a day PRN Agitation  OLANZapine 5 milliGRAM(s) Oral every 6 hours PRN agitation        ROS: Psych: See HPI.  All other systems negative.                          13.1   8.9   )-----------( 311      ( 04 Apr 2018 06:08 )             39.2   04 Apr 2018 06:08    132    |  97     |  14     ----------------------------<  97     4.8     |  29     |  1.10     Ca    8.4        04 Apr 2018 06:08  Mg     2.2       03 Apr 2018 04:34    TPro  6.8    /  Alb  3.5    /  TBili  0.6    /  DBili  x      /  AST  22     /  ALT  26     /  AlkPhos  55     03 Apr 2018 04:34    TSH:     Utox:  Imaging:  Other Tests:    Old Records reviewed:    EXAM:  Vital Signs Last 24 Hrs  T(C): 36.9 (04-04-18 @ 13:01), Max: 36.9 (04-03-18 @ 16:00)  T(F): 98.4 (04-04-18 @ 13:01), Max: 98.4 (04-03-18 @ 16:00)  HR: 80 (04-04-18 @ 13:01) (64 - 80)  BP: 118/70 (04-04-18 @ 06:11) (118/70 - 130/61)  BP(mean): --  RR: 17 (04-04-18 @ 13:01) (16 - 18)  SpO2: 98% (04-04-18 @ 13:01) (98% - 100%)  Gen Appearance: Fair grooming and hygiene  Gait/Station/Muscle Tone: WNL  Abnl Movements: Absent  Speech: Patient refuses to engage  TP; Non-verbal  Associations: Non-verbal  TC: Internally preoccupied  Mood: Irritable  Affect: Constricted  Consciousness/orientation: Unable to assess   Memory:   Recent: Unable to assess    Remote: Unable to assess   Attention/Concentration: Unable to assess   Language: Unable to assess   Fund of Knowledge: Unable to assess   Insight: Poor  Judgment: Poor    Suicide Risk Assessment: Unable to assess        DX: Bipolar disorder mixed with psychotic features    REC: Continue current treatment  Observation Status: 1:1 Statement Selected

## 2024-11-03 NOTE — DISCHARGE NOTE ADULT - NURSING SECTION COMPLETE
Provider E-Visit time total (minutes): 3 minutes (first outreach)      
Patient/Caregiver provided printed discharge information.
